# Patient Record
Sex: MALE | Race: WHITE | NOT HISPANIC OR LATINO | Employment: FULL TIME | URBAN - METROPOLITAN AREA
[De-identification: names, ages, dates, MRNs, and addresses within clinical notes are randomized per-mention and may not be internally consistent; named-entity substitution may affect disease eponyms.]

---

## 2017-06-12 ENCOUNTER — HOSPITAL ENCOUNTER (EMERGENCY)
Facility: HOSPITAL | Age: 47
Discharge: HOME | End: 2017-06-12
Attending: EMERGENCY MEDICINE
Payer: COMMERCIAL

## 2017-06-12 VITALS
DIASTOLIC BLOOD PRESSURE: 96 MMHG | OXYGEN SATURATION: 94 % | WEIGHT: 220 LBS | RESPIRATION RATE: 16 BRPM | SYSTOLIC BLOOD PRESSURE: 139 MMHG | HEIGHT: 69 IN | TEMPERATURE: 98 F | BODY MASS INDEX: 32.58 KG/M2 | HEART RATE: 93 BPM

## 2017-06-12 DIAGNOSIS — K62.5 RECTAL BLEEDING: Primary | ICD-10-CM

## 2017-06-12 LAB
ALBUMIN SERPL BCP-MCNC: 3.4 G/DL
ALP SERPL-CCNC: 43 U/L
ALT SERPL W/O P-5'-P-CCNC: 15 U/L
ANION GAP SERPL CALC-SCNC: 9 MMOL/L
APTT BLDCRRT: 24.7 SEC
AST SERPL-CCNC: 12 U/L
BASOPHILS # BLD AUTO: 0 K/UL
BASOPHILS NFR BLD: 0.3 %
BILIRUB SERPL-MCNC: 0.5 MG/DL
BUN SERPL-MCNC: 17 MG/DL
CALCIUM SERPL-MCNC: 8.9 MG/DL
CHLORIDE SERPL-SCNC: 107 MMOL/L
CO2 SERPL-SCNC: 25 MMOL/L
CREAT SERPL-MCNC: 0.9 MG/DL
DIFFERENTIAL METHOD: ABNORMAL
EOSINOPHIL # BLD AUTO: 0.4 K/UL
EOSINOPHIL NFR BLD: 4.8 %
ERYTHROCYTE [DISTWIDTH] IN BLOOD BY AUTOMATED COUNT: 13.8 %
EST. GFR  (AFRICAN AMERICAN): >60 ML/MIN/1.73 M^2
EST. GFR  (NON AFRICAN AMERICAN): >60 ML/MIN/1.73 M^2
GLUCOSE SERPL-MCNC: 156 MG/DL
HCT VFR BLD AUTO: 43.1 %
HGB BLD-MCNC: 14.5 G/DL
INR PPP: 1.1
LYMPHOCYTES # BLD AUTO: 1.1 K/UL
LYMPHOCYTES NFR BLD: 12.7 %
MCH RBC QN AUTO: 29.5 PG
MCHC RBC AUTO-ENTMCNC: 33.7 %
MCV RBC AUTO: 87 FL
MONOCYTES # BLD AUTO: 0.7 K/UL
MONOCYTES NFR BLD: 7.8 %
NEUTROPHILS # BLD AUTO: 6.5 K/UL
NEUTROPHILS NFR BLD: 74.4 %
PLATELET # BLD AUTO: 202 K/UL
PMV BLD AUTO: 8.4 FL
POTASSIUM SERPL-SCNC: 3.5 MMOL/L
PROT SERPL-MCNC: 6.4 G/DL
PROTHROMBIN TIME: 11.2 SEC
RBC # BLD AUTO: 4.93 M/UL
SODIUM SERPL-SCNC: 141 MMOL/L
WBC # BLD AUTO: 8.8 K/UL

## 2017-06-12 PROCEDURE — 85610 PROTHROMBIN TIME: CPT

## 2017-06-12 PROCEDURE — 85730 THROMBOPLASTIN TIME PARTIAL: CPT

## 2017-06-12 PROCEDURE — 36415 COLL VENOUS BLD VENIPUNCTURE: CPT

## 2017-06-12 PROCEDURE — 99283 EMERGENCY DEPT VISIT LOW MDM: CPT

## 2017-06-12 PROCEDURE — 85025 COMPLETE CBC W/AUTO DIFF WBC: CPT

## 2017-06-12 PROCEDURE — 80053 COMPREHEN METABOLIC PANEL: CPT

## 2017-06-12 RX ORDER — NAPROXEN SODIUM 220 MG/1
81 TABLET, FILM COATED ORAL DAILY
COMMUNITY
End: 2022-08-28 | Stop reason: CLARIF

## 2017-06-13 NOTE — ED PROVIDER NOTES
Encounter Date: 6/12/2017    SCRIBE #1 NOTE: I, Ness Jackson, am scribing for, and in the presence of, Dr. Rudolph.       History     Chief Complaint   Patient presents with    Rectal Bleeding     bright red blood x 1 episode tonight. Patient reports 1 episode of nose bleed saturday     Review of patient's allergies indicates:  No Known Allergies  06/12/2017  9:51 PM     Chief Complaint: Blood in stool      The patient is a 46 y.o. male with a PMHx of DM and HTN who is presenting with a sudden acute onset of one episode of blood in stool that occurred 1.5 hrs ago. Pt described his blood in stool as bright red blood in the toilet and very little brown stool with dark red spots. No hx of similar symptoms or prior bleeding. Pt reported a hx of colon polyps noted during his last colonoscopy 15 yrs ago. He also c/o a nosebleed 2 days ago. Pt currently on Aspirin. He denied eating anything red recently. No SOB. No abd pain or rectal pain. No syncope. Pt has a past surgical history that includes Back surgery and right knee arthroscope.        The history is provided by the patient and the spouse.     Past Medical History:   Diagnosis Date    Diabetes mellitus     Hypertension      Past Surgical History:   Procedure Laterality Date    BACK SURGERY      right knee arthroscope  1986     Family History   Problem Relation Age of Onset    Arthritis Mother     Diabetes Mother     Heart disease Mother     Hyperlipidemia Father     Hypertension Father     Cancer Maternal Aunt      Social History   Substance Use Topics    Smoking status: Never Smoker    Smokeless tobacco: Not on file    Alcohol use 1.2 oz/week     2 Cans of beer per week      Comment: occassional     Review of Systems   Constitutional: Negative for fever.   HENT: Positive for nosebleeds (Nosebleed 2 days ago.).    Eyes: Negative for visual disturbance.   Respiratory: Negative for shortness of breath.    Cardiovascular: Negative for chest pain.    Gastrointestinal: Positive for blood in stool. Negative for abdominal pain and rectal pain.   Genitourinary: Negative for dysuria.   Musculoskeletal: Negative for back pain.   Skin: Negative for rash.   Neurological: Negative for syncope.   Hematological: Does not bruise/bleed easily.   Psychiatric/Behavioral: Negative for confusion.       Physical Exam     Initial Vitals [06/12/17 2052]   BP Pulse Resp Temp SpO2   (!) 155/98 101 16 97.6 °F (36.4 °C) 96 %     Physical Exam    Nursing note and vitals reviewed.  Constitutional: He appears well-developed and well-nourished.   HENT:   Head: Normocephalic and atraumatic.   Mouth/Throat: Oropharynx is clear and moist.   Eyes: Conjunctivae and EOM are normal. Pupils are equal, round, and reactive to light.   Neck: Neck supple.   Cardiovascular: Normal rate, regular rhythm, normal heart sounds and intact distal pulses. Exam reveals no gallop and no friction rub.    No murmur heard.  Pulmonary/Chest: Breath sounds normal. He has no wheezes. He has no rhonchi. He has no rales.   Abdominal: Soft. He exhibits no distension. There is no tenderness.   Genitourinary:   Genitourinary Comments: Rectal Exam: Trace blood on glove. Guaiac positive stool. No external hemorrhoids. No unusual tenderness or masses.   Musculoskeletal: Normal range of motion.   Neurological: He is alert and oriented to person, place, and time.   Skin: No rash noted. No erythema.   Psychiatric: He has a normal mood and affect.         ED Course   Procedures  Labs Reviewed   CBC W/ AUTO DIFFERENTIAL - Abnormal; Notable for the following:        Result Value    MPV 8.4 (*)     Gran% 74.4 (*)     Lymph% 12.7 (*)     All other components within normal limits   COMPREHENSIVE METABOLIC PANEL - Abnormal; Notable for the following:     Glucose 156 (*)     Albumin 3.4 (*)     Alkaline Phosphatase 43 (*)     All other components within normal limits   PROTIME-INR   APTT                        Scribe Attestation:    Scribe #1: I performed the above scribed service and the documentation accurately describes the services I performed. I attest to the accuracy of the note.    Attending Attestation:           Physician Attestation for Scribe:  Physician Attestation Statement for Scribe #1: I, Dr. Rudolph, reviewed documentation, as scribed by Ness Jackson in my presence, and it is both accurate and complete.         Jules Floyd Jr. is a 46 y.o. male presenting with one episode of bright red blood per rectum.  Patient is well-appearing with no anemia.  Very low suspicion for hypovolemia or life-threatening bleeding.  Differential including AVM, diverticulosis, polyp, internal hemorrhoids discussed with patient in detail.  GI follow-up recommended.  Return precautions reviewed.        ED Course     Clinical Impression:   The encounter diagnosis was Rectal bleeding.          Cali Rudolph MD  06/13/17 8523

## 2017-06-14 ENCOUNTER — HOSPITAL ENCOUNTER (EMERGENCY)
Facility: HOSPITAL | Age: 47
Discharge: HOME | End: 2017-06-14
Attending: EMERGENCY MEDICINE
Payer: COMMERCIAL

## 2017-06-14 VITALS
SYSTOLIC BLOOD PRESSURE: 142 MMHG | HEIGHT: 69 IN | BODY MASS INDEX: 31.84 KG/M2 | TEMPERATURE: 98 F | DIASTOLIC BLOOD PRESSURE: 89 MMHG | RESPIRATION RATE: 16 BRPM | HEART RATE: 96 BPM | WEIGHT: 215 LBS | OXYGEN SATURATION: 97 %

## 2017-06-14 DIAGNOSIS — K62.5 RECTAL BLEEDING: Primary | ICD-10-CM

## 2017-06-14 LAB
ALBUMIN SERPL BCP-MCNC: 3.7 G/DL
ALP SERPL-CCNC: 45 U/L
ALT SERPL W/O P-5'-P-CCNC: 16 U/L
ANION GAP SERPL CALC-SCNC: 10 MMOL/L
AST SERPL-CCNC: 12 U/L
BASOPHILS # BLD AUTO: 0 K/UL
BASOPHILS NFR BLD: 0 %
BILIRUB SERPL-MCNC: 0.4 MG/DL
BUN SERPL-MCNC: 19 MG/DL
CALCIUM SERPL-MCNC: 9.6 MG/DL
CHLORIDE SERPL-SCNC: 106 MMOL/L
CO2 SERPL-SCNC: 25 MMOL/L
CREAT SERPL-MCNC: 1 MG/DL
DIFFERENTIAL METHOD: ABNORMAL
EOSINOPHIL # BLD AUTO: 0.3 K/UL
EOSINOPHIL NFR BLD: 2.5 %
ERYTHROCYTE [DISTWIDTH] IN BLOOD BY AUTOMATED COUNT: 13.6 %
EST. GFR  (AFRICAN AMERICAN): >60 ML/MIN/1.73 M^2
EST. GFR  (NON AFRICAN AMERICAN): >60 ML/MIN/1.73 M^2
GLUCOSE SERPL-MCNC: 107 MG/DL
HCT VFR BLD AUTO: 46 %
HGB BLD-MCNC: 15.7 G/DL
LYMPHOCYTES # BLD AUTO: 1 K/UL
LYMPHOCYTES NFR BLD: 8.9 %
MCH RBC QN AUTO: 29.6 PG
MCHC RBC AUTO-ENTMCNC: 34.1 %
MCV RBC AUTO: 87 FL
MONOCYTES # BLD AUTO: 0.6 K/UL
MONOCYTES NFR BLD: 5.2 %
NEUTROPHILS # BLD AUTO: 9 K/UL
NEUTROPHILS NFR BLD: 83.4 %
PLATELET # BLD AUTO: 236 K/UL
PMV BLD AUTO: 7.9 FL
POTASSIUM SERPL-SCNC: 4.4 MMOL/L
PROT SERPL-MCNC: 7.1 G/DL
RBC # BLD AUTO: 5.3 M/UL
SODIUM SERPL-SCNC: 141 MMOL/L
WBC # BLD AUTO: 10.8 K/UL

## 2017-06-14 PROCEDURE — 99283 EMERGENCY DEPT VISIT LOW MDM: CPT

## 2017-06-14 PROCEDURE — 36415 COLL VENOUS BLD VENIPUNCTURE: CPT

## 2017-06-14 PROCEDURE — 85025 COMPLETE CBC W/AUTO DIFF WBC: CPT

## 2017-06-14 PROCEDURE — 80053 COMPREHEN METABOLIC PANEL: CPT

## 2017-06-14 NOTE — ED PROVIDER NOTES
"Encounter Date: 6/14/2017    SCRIBE #1 NOTE: I, Judith Rodgers, am scribing for, and in the presence of, Dr. Hernandez.       History     Chief Complaint   Patient presents with    Rectal Bleeding     Dark rectal bleeding.  "I just can't stop.  It's bad".  Denies abdominal pain.  Seen here for same a couple of days ago.      Review of patient's allergies indicates:  No Known Allergies    06/14/2017 10:36 AM     Chief Complaint: Blood in stool      The patient is a 46 y.o. male with DM and HTN who presents to the ED with an onset of progressively worsening bright red blood with clots present in his stool over the last 3 days with 4 episodes this morning. He was seen in the ER 3 days ago and was informed to returned if his symptoms worsened. The patient reports prior similar symptoms ~15 years ago where a colonoscopy was performed by Dr. Alex Garcia that revealed polyps. He denies abdominal pain or any other symptoms at this time. No pertinent SHx noted.       The history is provided by the patient and the spouse (wife).     Past Medical History:   Diagnosis Date    Diabetes mellitus     Hypertension      Past Surgical History:   Procedure Laterality Date    BACK SURGERY      right knee arthroscope  1986     Family History   Problem Relation Age of Onset    Arthritis Mother     Diabetes Mother     Heart disease Mother     Hyperlipidemia Father     Hypertension Father     Cancer Maternal Aunt      Social History   Substance Use Topics    Smoking status: Never Smoker    Smokeless tobacco: Not on file    Alcohol use 1.2 oz/week     2 Cans of beer per week      Comment: occassional     Review of Systems   Constitutional: Negative for chills and fever.   HENT: Negative for nosebleeds.    Eyes: Negative for visual disturbance.   Respiratory: Negative for cough and shortness of breath.    Cardiovascular: Negative for chest pain and palpitations.   Gastrointestinal: Positive for blood in stool. Negative for " abdominal pain, diarrhea, nausea and vomiting.   Genitourinary: Negative for dysuria and hematuria.   Musculoskeletal: Negative for back pain and neck pain.   Skin: Negative for rash.   Neurological: Negative for seizures, syncope and headaches.     Physical Exam     Initial Vitals [06/14/17 0928]   BP Pulse Resp Temp SpO2   (!) 133/95 103 20 97.7 °F (36.5 °C) 96 %     Physical Exam    Nursing note and vitals reviewed.  Constitutional: He appears well-developed and well-nourished.  Non-toxic appearance. No distress.   HENT:   Head: Normocephalic and atraumatic.   Eyes: EOM are normal. Pupils are equal, round, and reactive to light.   Neck: Normal range of motion. Neck supple. No no neck rigidity. No JVD present.   Cardiovascular: Normal rate, regular rhythm, normal heart sounds and intact distal pulses.   Abdominal: Soft. Bowel sounds are normal. He exhibits no distension. There is no tenderness. There is no rigidity, no rebound and no guarding.   Genitourinary: Rectal exam shows guaiac positive stool. Guaiac positive stool. : Acceptable.  Genitourinary Comments: Dark red/maroon blood present on rectal exam.    Musculoskeletal: Normal range of motion.   Neurological: He is alert and oriented to person, place, and time. He has normal strength and normal reflexes. No cranial nerve deficit or sensory deficit. He exhibits normal muscle tone. Coordination normal. GCS eye subscore is 4. GCS verbal subscore is 5. GCS motor subscore is 6.   Skin: Skin is warm and dry.   Psychiatric: He has a normal mood and affect. His speech is normal and behavior is normal. He is not actively hallucinating.       ED Course   Procedures  Labs Reviewed   CBC W/ AUTO DIFFERENTIAL - Abnormal; Notable for the following:        Result Value    MPV 7.9 (*)     Gran # 9.0 (*)     Gran% 83.4 (*)     Lymph% 8.9 (*)     All other components within normal limits   COMPREHENSIVE METABOLIC PANEL - Abnormal; Notable for the following:      Alkaline Phosphatase 45 (*)     All other components within normal limits           Medical Decision Making:   History:   Old Medical Records: I decided to obtain old medical records.  Initial Assessment:   Patient is a 46-year-old man who presents emergency department for evaluation of rectal bleeding.  He has been having symptoms since Monday of became worse this morning have an approximately 4 episodes of bloody bowel movements this morning.  No chest pain or shortness of breath but patient did feel lightheaded.  Rectal exam shows a mild amount of dark maroon colored blood which is Hemoccult positive.  He has had no episodes bloody bowel movements in the ED.  Vital signs are stable, mild initial tachycardia which resolved.  Hemoglobin and hematocrit normal at 15.7 and 46.  Platelets are normal at 236.  Discussed with Dr. Garcia for outpatient follow-up and is agreeable to perform colonoscopy on Friday.  With patient and family members and informed him of workup and plan.  They're in agreement.  Return cautions were discussed for shortness of breath, chest pain, syncopal episodes, worsening bleeding or any other concerns.  He is discharge improved in no acute distress.  Clinical Tests:   Lab Tests: Ordered and Reviewed            Scribe Attestation:   Scribe #1: I performed the above scribed service and the documentation accurately describes the services I performed. I attest to the accuracy of the note.    Attending Attestation:           Physician Attestation for Scribe:  Physician Attestation Statement for Scribe #1: I, Dr. Hernandez, reviewed documentation, as scribed by Judith Rodgers in my presence, and it is both accurate and complete.                 ED Course     Clinical Impression:     1. Rectal bleeding          Disposition:   Disposition: Discharged  Condition: Stable       Capo Hernandez MD  06/14/17 [id number]

## 2017-06-14 NOTE — ED NOTES
Pt presents to ED with c/o dark red rectal bleeding x4 days. Pt has had rectal bleeding in the past due to polyps. Pt is AAOx4. Skin warm, dry to touch. Respirations even, nonlabored. NAD noted. VSS. Pt denies abdominal pain, nausea, or vomiting. Pt reports that he has had four blood BM today.

## 2017-06-17 LAB
GLUCOSE SERPL-MCNC: 145 MG/DL (ref 70–99)
GLUCOSE SERPL-MCNC: 171 MG/DL (ref 70–99)
GLUCOSE SERPL-MCNC: 94 MG/DL (ref 70–99)
GLUCOSE SERPL-MCNC: 96 MG/DL (ref 70–99)

## 2017-06-18 LAB
BUN SERPL-MCNC: 10 MG/DL (ref 8–20)
CALCIUM SERPL-MCNC: 8.8 MG/DL (ref 7.7–10.4)
CHLORIDE: 107 MMOL/L (ref 98–110)
CO2 SERPL-SCNC: 28.2 MMOL/L (ref 22.8–31.6)
CREATININE: 0.86 MG/DL (ref 0.6–1.4)
GLUCOSE SERPL-MCNC: 104 MG/DL (ref 70–99)
GLUCOSE SERPL-MCNC: 140 MG/DL (ref 70–99)
GLUCOSE: 161 MG/DL (ref 70–99)
HCT VFR BLD AUTO: 37 % (ref 39–55)
HGB BLD-MCNC: 12.4 G/DL (ref 14–16)
MCH RBC QN AUTO: 30.5 PG (ref 25–35)
MCHC RBC AUTO-ENTMCNC: 33.5 G/DL (ref 31–36)
MCV RBC AUTO: 91.1 FL (ref 80–100)
NUCLEATED RBCS: 0 %
PLATELET # BLD AUTO: 196 K/UL (ref 140–440)
POTASSIUM SERPL-SCNC: 4.5 MMOL/L (ref 3.5–5)
RBC # BLD AUTO: 4.06 M/UL (ref 4.3–5.9)
SODIUM: 140 MMOL/L (ref 134–144)
WBC # BLD AUTO: 7.8 K/UL (ref 5–10)

## 2017-06-21 DIAGNOSIS — E11.69 DIABETES MELLITUS TYPE 2 IN OBESE: ICD-10-CM

## 2017-06-21 DIAGNOSIS — E29.1 HYPOGONADISM IN MALE: Primary | ICD-10-CM

## 2017-06-21 DIAGNOSIS — E66.9 DIABETES MELLITUS TYPE 2 IN OBESE: ICD-10-CM

## 2017-06-22 RX ORDER — TESTOSTERONE CYPIONATE 200 MG/ML
200 INJECTION, SOLUTION INTRAMUSCULAR
Qty: 10 ML | Refills: 0 | Status: SHIPPED | OUTPATIENT
Start: 2017-06-22 | End: 2017-12-12 | Stop reason: SDUPTHER

## 2017-06-22 NOTE — TELEPHONE ENCOUNTER
I printed, but he is due for labs and OV.  They did some labs when he was in hospital, but not testosterone and some others.  I sent order to [facility]

## 2017-08-08 RX ORDER — DULAGLUTIDE 1.5 MG/.5ML
INJECTION, SOLUTION SUBCUTANEOUS
Qty: 6 ML | Refills: 0 | Status: SHIPPED | OUTPATIENT
Start: 2017-08-08 | End: 2017-10-18 | Stop reason: SDUPTHER

## 2017-10-18 RX ORDER — DULAGLUTIDE 1.5 MG/.5ML
INJECTION, SOLUTION SUBCUTANEOUS
Qty: 6 ML | Refills: 0 | Status: SHIPPED | OUTPATIENT
Start: 2017-10-18 | End: 2020-07-06

## 2017-12-12 DIAGNOSIS — E29.1 HYPOGONADISM IN MALE: ICD-10-CM

## 2017-12-13 RX ORDER — TESTOSTERONE CYPIONATE 200 MG/ML
200 INJECTION, SOLUTION INTRAMUSCULAR
Qty: 10 ML | Refills: 0 | Status: SHIPPED | OUTPATIENT
Start: 2017-12-13 | End: 2019-09-10 | Stop reason: CLARIF

## 2019-02-18 ENCOUNTER — HOSPITAL ENCOUNTER (EMERGENCY)
Facility: HOSPITAL | Age: 49
Discharge: HOME | End: 2019-02-18
Attending: EMERGENCY MEDICINE
Payer: COMMERCIAL

## 2019-02-18 VITALS
HEIGHT: 69 IN | BODY MASS INDEX: 34.07 KG/M2 | RESPIRATION RATE: 20 BRPM | HEART RATE: 109 BPM | WEIGHT: 230 LBS | TEMPERATURE: 98 F | SYSTOLIC BLOOD PRESSURE: 141 MMHG | OXYGEN SATURATION: 96 % | DIASTOLIC BLOOD PRESSURE: 87 MMHG

## 2019-02-18 DIAGNOSIS — R31.0 GROSS HEMATURIA: Primary | ICD-10-CM

## 2019-02-18 LAB
ALBUMIN SERPL BCP-MCNC: 3.7 G/DL
ALP SERPL-CCNC: 66 U/L
ALT SERPL W/O P-5'-P-CCNC: 17 U/L
ANION GAP SERPL CALC-SCNC: 8 MMOL/L
APTT BLDCRRT: 24.8 SEC
AST SERPL-CCNC: 13 U/L
BACTERIA #/AREA URNS HPF: ABNORMAL /HPF
BASOPHILS # BLD AUTO: 0 K/UL
BASOPHILS NFR BLD: 0.3 %
BILIRUB SERPL-MCNC: 0.4 MG/DL
BILIRUB UR QL STRIP: NEGATIVE
BUN SERPL-MCNC: 15 MG/DL
CALCIUM SERPL-MCNC: 9.1 MG/DL
CHLORIDE SERPL-SCNC: 103 MMOL/L
CLARITY UR: ABNORMAL
CO2 SERPL-SCNC: 27 MMOL/L
COLOR UR: YELLOW
CREAT SERPL-MCNC: 0.8 MG/DL
DIFFERENTIAL METHOD: ABNORMAL
EOSINOPHIL # BLD AUTO: 0.4 K/UL
EOSINOPHIL NFR BLD: 3.6 %
ERYTHROCYTE [DISTWIDTH] IN BLOOD BY AUTOMATED COUNT: 14.8 %
EST. GFR  (AFRICAN AMERICAN): >60 ML/MIN/1.73 M^2
EST. GFR  (NON AFRICAN AMERICAN): >60 ML/MIN/1.73 M^2
GLUCOSE SERPL-MCNC: 146 MG/DL
GLUCOSE UR QL STRIP: NEGATIVE
HCT VFR BLD AUTO: 43.6 %
HGB BLD-MCNC: 14.5 G/DL
HGB UR QL STRIP: ABNORMAL
INR PPP: 1
KETONES UR QL STRIP: ABNORMAL
LEUKOCYTE ESTERASE UR QL STRIP: NEGATIVE
LYMPHOCYTES # BLD AUTO: 1.5 K/UL
LYMPHOCYTES NFR BLD: 13.9 %
MCH RBC QN AUTO: 28.1 PG
MCHC RBC AUTO-ENTMCNC: 33.2 G/DL
MCV RBC AUTO: 85 FL
MICROSCOPIC COMMENT: ABNORMAL
MONOCYTES # BLD AUTO: 0.9 K/UL
MONOCYTES NFR BLD: 7.8 %
NEUTROPHILS # BLD AUTO: 8.1 K/UL
NEUTROPHILS NFR BLD: 74.4 %
NITRITE UR QL STRIP: NEGATIVE
PH UR STRIP: 7 [PH] (ref 5–8)
PLATELET # BLD AUTO: 291 K/UL
PMV BLD AUTO: 8.1 FL
POTASSIUM SERPL-SCNC: 3.8 MMOL/L
PROT SERPL-MCNC: 6.7 G/DL
PROT UR QL STRIP: ABNORMAL
PROTHROMBIN TIME: 10.2 SEC
RBC # BLD AUTO: 5.16 M/UL
RBC #/AREA URNS HPF: >100 /HPF (ref 0–4)
SODIUM SERPL-SCNC: 138 MMOL/L
SP GR UR STRIP: 1.02 (ref 1–1.03)
SQUAMOUS #/AREA URNS HPF: 2 /HPF
URN SPEC COLLECT METH UR: ABNORMAL
UROBILINOGEN UR STRIP-ACNC: 1 EU/DL
WBC # BLD AUTO: 10.9 K/UL
WBC #/AREA URNS HPF: 2 /HPF (ref 0–5)

## 2019-02-18 PROCEDURE — 81000 URINALYSIS NONAUTO W/SCOPE: CPT

## 2019-02-18 PROCEDURE — 85730 THROMBOPLASTIN TIME PARTIAL: CPT

## 2019-02-18 PROCEDURE — 36415 COLL VENOUS BLD VENIPUNCTURE: CPT

## 2019-02-18 PROCEDURE — 85025 COMPLETE CBC W/AUTO DIFF WBC: CPT

## 2019-02-18 PROCEDURE — 99282 EMERGENCY DEPT VISIT SF MDM: CPT

## 2019-02-18 PROCEDURE — 80053 COMPREHEN METABOLIC PANEL: CPT

## 2019-02-18 PROCEDURE — 85610 PROTHROMBIN TIME: CPT

## 2019-02-18 NOTE — ED PROVIDER NOTES
Encounter Date: 2/18/2019    SCRIBE #1 NOTE: I, Ioana Etienne , am scribing for, and in the presence of,  Dr. Ortiz. I have scribed the entire note.       History     Chief Complaint   Patient presents with    Hematuria     started 2 weeks ago and again 3 days ago worsening / no dysuria        02/18/2019  11:14 AM       The patient is a 48 y.o. male who is presenting with the gradual onset of intermittent hematuria that began several days ago. The pt reports that the blood is bright red with minimal clots. He denies any exacerbating or mitigating factors. No associated dysuria, flank pain, abdominal pain, rectal bleeding, penile pain, or swelling. He denies previous episodes of similar complaints. No other comments or complaints. Pt denies being on blood thinners. No hx of kidney or bladder cancer. Pertinent PMHx includes DM and HTN.             The history is provided by the patient and medical records.     Review of patient's allergies indicates:  No Known Allergies  Past Medical History:   Diagnosis Date    Diabetes mellitus     Hypertension      Past Surgical History:   Procedure Laterality Date    BACK SURGERY      right knee arthroscope  1986     Family History   Problem Relation Age of Onset    Arthritis Mother     Diabetes Mother     Heart disease Mother     Hyperlipidemia Father     Hypertension Father     Cancer Maternal Aunt      Social History     Tobacco Use    Smoking status: Never Smoker   Substance Use Topics    Alcohol use: Yes     Alcohol/week: 1.2 oz     Types: 2 Cans of beer per week     Comment: occassional    Drug use: No     Review of Systems   Constitutional: Negative for activity change, appetite change, chills, fatigue and fever.   Eyes: Negative for visual disturbance.   Respiratory: Negative for apnea and shortness of breath.    Cardiovascular: Negative for chest pain and palpitations.   Gastrointestinal: Negative for abdominal distention and abdominal pain.    Genitourinary: Positive for hematuria. Negative for difficulty urinating.   Musculoskeletal: Negative for neck pain.   Skin: Negative for pallor and rash.   Neurological: Negative for headaches.   Hematological: Does not bruise/bleed easily.   Psychiatric/Behavioral: Negative for agitation.       Physical Exam     Initial Vitals [02/18/19 1052]   BP Pulse Resp Temp SpO2   (!) 141/87 109 20 98.3 °F (36.8 °C) 96 %      MAP       --         Physical Exam    Nursing note and vitals reviewed.  Constitutional: He appears well-developed and well-nourished.   HENT:   Head: Normocephalic and atraumatic.   Mouth/Throat: Oropharynx is clear and moist.   Eyes: Conjunctivae and EOM are normal. Pupils are equal, round, and reactive to light.   Neck: Normal range of motion. Neck supple.   Cardiovascular: Normal rate, regular rhythm and normal heart sounds. Exam reveals no gallop and no friction rub.    No murmur heard.  Pulmonary/Chest: Breath sounds normal. No respiratory distress. He has no wheezes. He has no rhonchi. He has no rales.   Abdominal: Soft. He exhibits no distension and no mass. There is no tenderness.   No CVA tenderness. No flank pain.    Genitourinary: Penis normal.   Musculoskeletal: Normal range of motion.   Neurological: He is alert and oriented to person, place, and time. He has normal strength. No sensory deficit.   Skin: Skin is warm and dry.   Psychiatric: He has a normal mood and affect.         ED Course   Procedures  Labs Reviewed   URINALYSIS, REFLEX TO URINE CULTURE - Abnormal; Notable for the following components:       Result Value    Appearance, UA Hazy (*)     Protein, UA Trace (*)     Ketones, UA Trace (*)     Occult Blood UA 3+ (*)     All other components within normal limits    Narrative:     Preferred Collection Type->Urine, Clean Catch   URINALYSIS MICROSCOPIC - Abnormal; Notable for the following components:    RBC, UA >100 (*)     All other components within normal limits    Narrative:      Preferred Collection Type->Urine, Clean Catch   CBC W/ AUTO DIFFERENTIAL   COMPREHENSIVE METABOLIC PANEL   APTT   PROTIME-INR          Imaging Results    None          Medical Decision Making:   History:   Old Medical Records: I decided to obtain old medical records.  Clinical Tests:   Lab Tests: Ordered and Reviewed  ED Management:  48-year-old male presents with painless gross hematuria.  Urinalysis demonstrates only significant hematuria without evidence of pyuria or bacteriuria with infectious etiology unlikely.  The absence of pain makes stone disease unlikely.  He has no evidence of bleeding disorder.  He is referred to Urology for further workup.       APC / Resident Notes:   I, Dr. Jovany Ortiz III, personally performed the services described in this documentation. All medical record entries made by the scribe were at my direction and in my presence.  I have reviewed the chart and agree that the record reflects my personal performance and is accurate and complete       Scribe Attestation:   Scribe #1: I performed the above scribed service and the documentation accurately describes the services I performed. I attest to the accuracy of the note.               Clinical Impression:   There were no encounter diagnoses.                             Jovany Ortiz III, MD  02/18/19 [id number]

## 2019-09-10 ENCOUNTER — HOSPITAL ENCOUNTER (OUTPATIENT)
Facility: HOSPITAL | Age: 49
Discharge: HOME | End: 2019-09-11
Attending: EMERGENCY MEDICINE | Admitting: INTERNAL MEDICINE
Payer: COMMERCIAL

## 2019-09-10 DIAGNOSIS — I10 ESSENTIAL HYPERTENSION: ICD-10-CM

## 2019-09-10 DIAGNOSIS — Z79.4 DIABETES MELLITUS DUE TO UNDERLYING CONDITION WITH HYPEROSMOLARITY WITHOUT COMA, WITH LONG-TERM CURRENT USE OF INSULIN: ICD-10-CM

## 2019-09-10 DIAGNOSIS — R07.9 CHEST PAIN: ICD-10-CM

## 2019-09-10 DIAGNOSIS — I20.0 UNSTABLE ANGINA: ICD-10-CM

## 2019-09-10 DIAGNOSIS — R06.02 SOB (SHORTNESS OF BREATH): ICD-10-CM

## 2019-09-10 DIAGNOSIS — E08.00 DIABETES MELLITUS DUE TO UNDERLYING CONDITION WITH HYPEROSMOLARITY WITHOUT COMA, WITH LONG-TERM CURRENT USE OF INSULIN: ICD-10-CM

## 2019-09-10 DIAGNOSIS — R42 DIZZINESS: Primary | ICD-10-CM

## 2019-09-10 PROBLEM — E11.9 DIABETES MELLITUS: Status: ACTIVE | Noted: 2019-09-10

## 2019-09-10 LAB
ALBUMIN SERPL BCP-MCNC: 3.8 G/DL (ref 3.5–5.2)
ALP SERPL-CCNC: 78 U/L (ref 55–135)
ALT SERPL W/O P-5'-P-CCNC: 17 U/L (ref 10–44)
ANION GAP SERPL CALC-SCNC: 11 MMOL/L (ref 8–16)
AST SERPL-CCNC: 9 U/L (ref 10–40)
BASOPHILS # BLD AUTO: 0.04 K/UL (ref 0–0.2)
BASOPHILS NFR BLD: 0.3 % (ref 0–1.9)
BILIRUB SERPL-MCNC: 0.3 MG/DL (ref 0.1–1)
BNP SERPL-MCNC: <10 PG/ML (ref 0–99)
BUN SERPL-MCNC: 16 MG/DL (ref 6–20)
CALCIUM SERPL-MCNC: 9.9 MG/DL (ref 8.7–10.5)
CHLORIDE SERPL-SCNC: 103 MMOL/L (ref 95–110)
CK MB SERPL-MCNC: 0.6 NG/ML (ref 0.1–6.5)
CK MB SERPL-RTO: 1.7 % (ref 0–5)
CK SERPL-CCNC: 36 U/L (ref 20–200)
CO2 SERPL-SCNC: 24 MMOL/L (ref 23–29)
CREAT SERPL-MCNC: 0.9 MG/DL (ref 0.5–1.4)
D DIMER PPP IA.FEU-MCNC: 0.19 MG/L FEU
DIFFERENTIAL METHOD: ABNORMAL
EOSINOPHIL # BLD AUTO: 0.3 K/UL (ref 0–0.5)
EOSINOPHIL NFR BLD: 2.2 % (ref 0–8)
ERYTHROCYTE [DISTWIDTH] IN BLOOD BY AUTOMATED COUNT: 13.7 % (ref 11.5–14.5)
EST. GFR  (AFRICAN AMERICAN): >60 ML/MIN/1.73 M^2
EST. GFR  (NON AFRICAN AMERICAN): >60 ML/MIN/1.73 M^2
GLUCOSE SERPL-MCNC: 275 MG/DL (ref 70–110)
HCT VFR BLD AUTO: 45.5 % (ref 40–54)
HGB BLD-MCNC: 15.3 G/DL (ref 14–18)
IMM GRANULOCYTES # BLD AUTO: 0.09 K/UL (ref 0–0.04)
LYMPHOCYTES # BLD AUTO: 1.2 K/UL (ref 1–4.8)
LYMPHOCYTES NFR BLD: 9.3 % (ref 18–48)
MCH RBC QN AUTO: 28.5 PG (ref 27–31)
MCHC RBC AUTO-ENTMCNC: 33.6 G/DL (ref 32–36)
MCV RBC AUTO: 85 FL (ref 82–98)
MONOCYTES # BLD AUTO: 1.1 K/UL (ref 0.3–1)
MONOCYTES NFR BLD: 8.2 % (ref 4–15)
NEUTROPHILS # BLD AUTO: 10.2 K/UL (ref 1.8–7.7)
NEUTROPHILS NFR BLD: 79.3 % (ref 38–73)
NRBC BLD-RTO: 0 /100 WBC
PLATELET # BLD AUTO: 263 K/UL (ref 150–350)
PMV BLD AUTO: 10.2 FL (ref 9.2–12.9)
POCT GLUCOSE: 260 MG/DL (ref 70–110)
POTASSIUM SERPL-SCNC: 4 MMOL/L (ref 3.5–5.1)
PROT SERPL-MCNC: 7.2 G/DL (ref 6–8.4)
RBC # BLD AUTO: 5.36 M/UL (ref 4.6–6.2)
SODIUM SERPL-SCNC: 138 MMOL/L (ref 136–145)
T4 FREE SERPL-MCNC: 0.84 NG/DL (ref 0.71–1.51)
TROPONIN I SERPL DL<=0.01 NG/ML-MCNC: <0.006 NG/ML (ref 0–0.03)
TROPONIN I SERPL DL<=0.01 NG/ML-MCNC: <0.006 NG/ML (ref 0–0.03)
TSH SERPL DL<=0.005 MIU/L-ACNC: 0.54 UIU/ML (ref 0.4–4)
WBC # BLD AUTO: 12.8 K/UL (ref 3.9–12.7)

## 2019-09-10 PROCEDURE — 99285 EMERGENCY DEPT VISIT HI MDM: CPT | Mod: 25

## 2019-09-10 PROCEDURE — G0378 HOSPITAL OBSERVATION PER HR: HCPCS

## 2019-09-10 PROCEDURE — 84439 ASSAY OF FREE THYROXINE: CPT

## 2019-09-10 PROCEDURE — 25000003 PHARM REV CODE 250: Performed by: EMERGENCY MEDICINE

## 2019-09-10 PROCEDURE — 84443 ASSAY THYROID STIM HORMONE: CPT

## 2019-09-10 PROCEDURE — 93005 ELECTROCARDIOGRAM TRACING: CPT

## 2019-09-10 PROCEDURE — 83880 ASSAY OF NATRIURETIC PEPTIDE: CPT

## 2019-09-10 PROCEDURE — 82553 CREATINE MB FRACTION: CPT

## 2019-09-10 PROCEDURE — 63600175 PHARM REV CODE 636 W HCPCS: Performed by: INTERNAL MEDICINE

## 2019-09-10 PROCEDURE — 85379 FIBRIN DEGRADATION QUANT: CPT

## 2019-09-10 PROCEDURE — 96372 THER/PROPH/DIAG INJ SC/IM: CPT

## 2019-09-10 PROCEDURE — 85025 COMPLETE CBC W/AUTO DIFF WBC: CPT

## 2019-09-10 PROCEDURE — 36415 COLL VENOUS BLD VENIPUNCTURE: CPT

## 2019-09-10 PROCEDURE — 84484 ASSAY OF TROPONIN QUANT: CPT

## 2019-09-10 PROCEDURE — 84484 ASSAY OF TROPONIN QUANT: CPT | Mod: 91

## 2019-09-10 PROCEDURE — 80053 COMPREHEN METABOLIC PANEL: CPT

## 2019-09-10 PROCEDURE — 82550 ASSAY OF CK (CPK): CPT

## 2019-09-10 RX ORDER — IBUPROFEN 200 MG
24 TABLET ORAL
Status: DISCONTINUED | OUTPATIENT
Start: 2019-09-10 | End: 2019-09-10 | Stop reason: SDUPTHER

## 2019-09-10 RX ORDER — IBUPROFEN 200 MG
16 TABLET ORAL
Status: DISCONTINUED | OUTPATIENT
Start: 2019-09-10 | End: 2019-09-10 | Stop reason: SDUPTHER

## 2019-09-10 RX ORDER — ALPRAZOLAM 1 MG/1
1 TABLET ORAL
Status: COMPLETED | OUTPATIENT
Start: 2019-09-10 | End: 2019-09-10

## 2019-09-10 RX ORDER — BUPROPION HYDROCHLORIDE 150 MG/1
150 TABLET ORAL DAILY
COMMUNITY

## 2019-09-10 RX ORDER — BUPROPION HYDROCHLORIDE 150 MG/1
150 TABLET ORAL DAILY
Status: DISCONTINUED | OUTPATIENT
Start: 2019-09-11 | End: 2019-09-11 | Stop reason: HOSPADM

## 2019-09-10 RX ORDER — IBUPROFEN 200 MG
24 TABLET ORAL
Status: DISCONTINUED | OUTPATIENT
Start: 2019-09-10 | End: 2019-09-11 | Stop reason: HOSPADM

## 2019-09-10 RX ORDER — ACETAMINOPHEN 325 MG/1
650 TABLET ORAL EVERY 6 HOURS PRN
Status: DISCONTINUED | OUTPATIENT
Start: 2019-09-10 | End: 2019-09-11 | Stop reason: HOSPADM

## 2019-09-10 RX ORDER — GLUCAGON 1 MG
1 KIT INJECTION
Status: DISCONTINUED | OUTPATIENT
Start: 2019-09-10 | End: 2019-09-10 | Stop reason: SDUPTHER

## 2019-09-10 RX ORDER — INSULIN ASPART 100 [IU]/ML
0-5 INJECTION, SOLUTION INTRAVENOUS; SUBCUTANEOUS
Status: DISCONTINUED | OUTPATIENT
Start: 2019-09-10 | End: 2019-09-10 | Stop reason: SDUPTHER

## 2019-09-10 RX ORDER — INSULIN ASPART 100 [IU]/ML
0-5 INJECTION, SOLUTION INTRAVENOUS; SUBCUTANEOUS
Status: DISCONTINUED | OUTPATIENT
Start: 2019-09-10 | End: 2019-09-11 | Stop reason: HOSPADM

## 2019-09-10 RX ORDER — OXYCODONE AND ACETAMINOPHEN 7.5; 325 MG/1; MG/1
1 TABLET ORAL EVERY 12 HOURS PRN
COMMUNITY
End: 2021-04-29

## 2019-09-10 RX ORDER — ATORVASTATIN CALCIUM 40 MG/1
40 TABLET, FILM COATED ORAL DAILY
Status: DISCONTINUED | OUTPATIENT
Start: 2019-09-11 | End: 2019-09-11 | Stop reason: HOSPADM

## 2019-09-10 RX ORDER — METOPROLOL TARTRATE 50 MG/1
50 TABLET ORAL 2 TIMES DAILY
Status: DISCONTINUED | OUTPATIENT
Start: 2019-09-10 | End: 2019-09-11

## 2019-09-10 RX ORDER — ENOXAPARIN SODIUM 100 MG/ML
40 INJECTION SUBCUTANEOUS
Status: DISCONTINUED | OUTPATIENT
Start: 2019-09-10 | End: 2019-09-11 | Stop reason: HOSPADM

## 2019-09-10 RX ORDER — ONDANSETRON 2 MG/ML
4 INJECTION INTRAMUSCULAR; INTRAVENOUS EVERY 8 HOURS PRN
Status: DISCONTINUED | OUTPATIENT
Start: 2019-09-10 | End: 2019-09-11 | Stop reason: HOSPADM

## 2019-09-10 RX ORDER — FOSINOPRIL SODIUM AND HYDROCHLOROTHIAZIDE 20; 12.5 MG/1; MG/1
1 TABLET ORAL DAILY
Status: ON HOLD | COMMUNITY
End: 2022-08-25

## 2019-09-10 RX ORDER — SODIUM CHLORIDE 0.9 % (FLUSH) 0.9 %
10 SYRINGE (ML) INJECTION
Status: DISCONTINUED | OUTPATIENT
Start: 2019-09-10 | End: 2019-09-11 | Stop reason: HOSPADM

## 2019-09-10 RX ORDER — PANTOPRAZOLE SODIUM 40 MG/1
40 TABLET, DELAYED RELEASE ORAL DAILY
Status: DISCONTINUED | OUTPATIENT
Start: 2019-09-11 | End: 2019-09-10 | Stop reason: SDUPTHER

## 2019-09-10 RX ORDER — ATORVASTATIN CALCIUM 40 MG/1
40 TABLET, FILM COATED ORAL DAILY
COMMUNITY
End: 2020-07-30

## 2019-09-10 RX ORDER — HYDROCHLOROTHIAZIDE 12.5 MG/1
12.5 TABLET ORAL DAILY
Status: DISCONTINUED | OUTPATIENT
Start: 2019-09-11 | End: 2019-09-11 | Stop reason: HOSPADM

## 2019-09-10 RX ORDER — MELOXICAM 15 MG/1
15 TABLET ORAL DAILY
Status: ON HOLD | COMMUNITY
End: 2022-08-25

## 2019-09-10 RX ORDER — NAPROXEN SODIUM 220 MG/1
81 TABLET, FILM COATED ORAL DAILY
Status: DISCONTINUED | OUTPATIENT
Start: 2019-09-11 | End: 2019-09-11 | Stop reason: HOSPADM

## 2019-09-10 RX ORDER — FOSINOPIRL SODIUM 10 MG/1
20 TABLET ORAL DAILY
Status: DISCONTINUED | OUTPATIENT
Start: 2019-09-11 | End: 2019-09-11 | Stop reason: HOSPADM

## 2019-09-10 RX ORDER — FOSINOPRIL SODIUM AND HYDROCHLOROTHIAZIDE 20; 12.5 MG/1; MG/1
1 TABLET ORAL DAILY
Status: DISCONTINUED | OUTPATIENT
Start: 2019-09-11 | End: 2019-09-10 | Stop reason: SDUPTHER

## 2019-09-10 RX ORDER — GLUCAGON 1 MG
1 KIT INJECTION
Status: DISCONTINUED | OUTPATIENT
Start: 2019-09-10 | End: 2019-09-11 | Stop reason: HOSPADM

## 2019-09-10 RX ORDER — METOPROLOL TARTRATE 50 MG/1
50 TABLET ORAL 2 TIMES DAILY
COMMUNITY
End: 2022-01-20

## 2019-09-10 RX ORDER — PANTOPRAZOLE SODIUM 40 MG/1
40 TABLET, DELAYED RELEASE ORAL DAILY
Status: DISCONTINUED | OUTPATIENT
Start: 2019-09-11 | End: 2019-09-11 | Stop reason: HOSPADM

## 2019-09-10 RX ORDER — DEXTROAMPHETAMINE SACCHARATE, AMPHETAMINE ASPARTATE, DEXTROAMPHETAMINE SULFATE AND AMPHETAMINE SULFATE 7.5; 7.5; 7.5; 7.5 MG/1; MG/1; MG/1; MG/1
30 TABLET ORAL DAILY
COMMUNITY
End: 2020-09-02

## 2019-09-10 RX ORDER — IBUPROFEN 200 MG
16 TABLET ORAL
Status: DISCONTINUED | OUTPATIENT
Start: 2019-09-10 | End: 2019-09-11 | Stop reason: HOSPADM

## 2019-09-10 RX ADMIN — ALPRAZOLAM 1 MG: 1 TABLET ORAL at 05:09

## 2019-09-10 RX ADMIN — ENOXAPARIN SODIUM 40 MG: 100 INJECTION SUBCUTANEOUS at 11:09

## 2019-09-10 RX ADMIN — INSULIN ASPART 1 UNITS: 100 INJECTION, SOLUTION INTRAVENOUS; SUBCUTANEOUS at 11:09

## 2019-09-10 RX ADMIN — METOPROLOL TARTRATE 50 MG: 50 TABLET, FILM COATED ORAL at 11:09

## 2019-09-10 NOTE — ED NOTES
Pt awake alert oriented reports SOB with dizziness for the past few days, pt was seen by MD today and told to come to ED if symptoms got worse, pt denies chest pain reports SOB on exertion

## 2019-09-10 NOTE — ED PROVIDER NOTES
"Encounter Date: 9/10/2019    SCRIBE #1 NOTE: I, Sheebapriscila Ledezma, am scribing for, and in the presence of, Jovany Ortiz MD.       History     Chief Complaint   Patient presents with    Shortness of Breath     x 1 month     Dizziness    Weakness       Time seen by provider: 3:53 PM on 09/10/2019    Jules Floyd Jr. is a 48 y.o. male with PMHx of DM and HTN who presents to the ED with complaints of SOB, fatigue, and sweating. He reports having SOB for the past x1-2 weeks but is not associated with exertion or laying flat. He endorses sweating "uncontrollably" for the past x1 month and expresses it may be secondary to uncontrolled BP. The patient is presently on Lisinopril for BP, but states "it doesn't work". He denies diarrhea, blood in stool, black, tarry stool, shakiness, weight loss, chest pain, palpitations, or fevers. Today, the patient was seen my Ina Ellsworth NP and was given a dose of Clonidine for BP with little to no improvements. The patient denies history of heart disease but endorses his mother passed away at 58 years old due to CHF. The patient denies history of anxiety. He has no other medical concerns or complaints at this moment. He denies onset of any other new symptoms currently. SHx includes back surgery and right knee arthroscope. NKDA noted.     The history is provided by the patient.     Review of patient's allergies indicates:  No Known Allergies  Past Medical History:   Diagnosis Date    Diabetes mellitus     Hypertension      Past Surgical History:   Procedure Laterality Date    BACK SURGERY      right knee arthroscope  1986     Family History   Problem Relation Age of Onset    Arthritis Mother     Diabetes Mother     Heart disease Mother     Hyperlipidemia Father     Hypertension Father     Cancer Maternal Aunt      Social History     Tobacco Use    Smoking status: Never Smoker   Substance Use Topics    Alcohol use: Yes     Alcohol/week: 1.2 oz     Types: 2 Cans of beer " per week     Comment: occassional    Drug use: No     Review of Systems   Constitutional: Positive for diaphoresis and fatigue.   Respiratory: Positive for shortness of breath. Negative for cough and wheezing.    Cardiovascular: Negative for chest pain.   Gastrointestinal: Negative for abdominal pain, diarrhea, nausea and vomiting.   Genitourinary: Negative for difficulty urinating and dysuria.   Musculoskeletal: Negative for joint swelling.   Skin: Negative for rash.   Neurological: Negative for numbness.   Hematological: Does not bruise/bleed easily.   Psychiatric/Behavioral: The patient is not nervous/anxious.        Physical Exam     Initial Vitals [09/10/19 1544]   BP Pulse Resp Temp SpO2   (!) 141/91 107 20 98.1 °F (36.7 °C) 95 %      MAP       --         Physical Exam    Nursing note and vitals reviewed.  Constitutional: He appears well-developed and well-nourished. He is not diaphoretic. No distress.   HENT:   Head: Normocephalic and atraumatic.   Eyes: Conjunctivae are normal.   Neck: Normal range of motion. Neck supple. No thyromegaly present. No JVD present.   Negative JVD. No thyromegaly.    Cardiovascular: Regular rhythm and normal heart sounds. Tachycardia present.  Exam reveals no gallop and no friction rub.    No murmur heard.  Pulmonary/Chest: Breath sounds normal. No respiratory distress. He has no wheezes. He has no rhonchi. He has no rales.   Equal, bilateral breath sounds noted without wheezing.    Abdominal: Soft. He exhibits no distension. There is no hepatomegaly. There is no tenderness. There is no rebound and no guarding.   Abdomen soft and non tender. No distention. Negative rebound or guarding. No hepatomegaly.    Musculoskeletal: Normal range of motion.   Neurological: He is alert and oriented to person, place, and time.   Skin: Skin is warm and dry.   Psychiatric: He has a normal mood and affect.         ED Course   Procedures  Labs Reviewed   CBC W/ AUTO DIFFERENTIAL - Abnormal;  Notable for the following components:       Result Value    WBC 12.80 (*)     Gran # (ANC) 10.2 (*)     Immature Grans (Abs) 0.09 (*)     Mono # 1.1 (*)     Gran% 79.3 (*)     Lymph% 9.3 (*)     All other components within normal limits   COMPREHENSIVE METABOLIC PANEL - Abnormal; Notable for the following components:    Glucose 275 (*)     AST 9 (*)     All other components within normal limits   B-TYPE NATRIURETIC PEPTIDE   D DIMER, QUANTITATIVE   TSH   TROPONIN I          Imaging Results          X-Ray Chest PA And Lateral (Final result)  Result time 09/10/19 16:21:47    Final result by Ron Hills MD (09/10/19 16:21:47)                 Impression:      Negative chest.  No significant change.      Electronically signed by: Ron Hills MD  Date:    09/10/2019  Time:    16:21             Narrative:    EXAMINATION:  XR CHEST PA AND LATERAL    CLINICAL HISTORY:  Shortness of breath    TECHNIQUE:  PA and lateral views of the chest were performed.    COMPARISON:  01/25/2017    FINDINGS:  The cardiomediastinal silhouette is with normal limits.  The lungs are well expanded without consolidation or pleural effusion.                                 Medical Decision Making:   History:   Old Medical Records: I decided to obtain old medical records.  Clinical Tests:   Lab Tests: Reviewed and Ordered  Radiological Study: Reviewed and Ordered  Medical Tests: Reviewed and Ordered  ED Management:  48-year-old male presents with dizziness with intermittent shortness of breath and chest pain.  Initial workup is negative for cardiac ischemia/MI.  Chest x-ray independently interpreted by me is normal with no evidence of pneumothorax or CHF.  He will be admitted for serial cardiac enzymes and consideration of provocative testing.  Other:   I have discussed this case with another health care provider.  Discussed with Dr. Pierre who will admit the patient            Scribe Attestation:   Scribe #1: I performed the above  scribed service and the documentation accurately describes the services I performed. I attest to the accuracy of the note.               Clinical Impression:       ICD-10-CM ICD-9-CM   1. Dizziness R42 780.4   2. SOB (shortness of breath) R06.02 786.05   3. Unstable angina I20.0 411.1   4. Chest pain R07.9 786.50   5. Diabetes mellitus due to underlying condition with hyperosmolarity without coma, with long-term current use of insulin E08.00 249.20    Z79.4 V58.67   6. Essential hypertension I10 401.9                                Jovany Ortiz III, MD  09/11/19 [id number]

## 2019-09-10 NOTE — LETTER
09/11/2019    To Whom it may concern:                 [address] [place] [id number]  Phone: [phone]   09/11/2019    Patient: Jules Floyd Jr.   YOB: 1970   Date of Visit: 9/10/2019-9/11/19       To Whom it May Concern:    Jules Floyd was seen under my care at [facility] on 9/10/19-9/11/19. Please excuse Yesenia Kapoor while he was under our care.     If you have any questions or concerns, please don't hesitate to call.    Sincerely,       MD Valeri Malcolm RN

## 2019-09-10 NOTE — H&P
PCP: JAYDA Amaya    History & Physical    Chief Complaint:  Dizziness and shortness of breath for 1 month    History of Present Illness:  Patient is a 48 y.o. male admitted to Hospitalist Service from [facility] Room with complaint of shortness of breath and dizziness for 1. Patient reportedly has past medical history significant for hypertension, family history of coronary artery disease and diabetes mellitus - 2.  Patient presented to the emergency room with complaint of worsening fatigue associated with shortness of breath and sweating.  The symptoms are present for almost 1 month.  Patient reports his blood pressure has been running high despite taking lisinopril.  Patient states blood pressure medication is not controlling blood pressure were well.  Today patient was seen by Ina Ellsworth NP and received a dose of oral clonidine for high blood pressure.  Patient denies cigarette smoking. Patient denied abdominal pain, nausea, vomiting, headache, vision changes, focal neuro-deficits, cough or fever.    Past Medical History:   Diagnosis Date    Diabetes mellitus     Hypertension      Past Surgical History:   Procedure Laterality Date    BACK SURGERY      right knee arthroscope  1986     Family History   Problem Relation Age of Onset    Arthritis Mother     Diabetes Mother     Heart disease Mother     Hyperlipidemia Father     Hypertension Father     Cancer Maternal Aunt      Social History     Tobacco Use    Smoking status: Never Smoker   Substance Use Topics    Alcohol use: Yes     Alcohol/week: 1.2 oz     Types: 2 Cans of beer per week     Comment: occassional    Drug use: No      Review of patient's allergies indicates:  No Known Allergies    (Not in a hospital admission)  Review of Systems:  Constitutional: no fever or chills. + Diaphoresis + fatigue  Eyes: no visual changes  Ears, nose, mouth, throat, and face: no nasal congestion or sore throat  Respiratory: no  cough + shorness of breath  Cardiovascular: see HPI  Gastrointestinal: no nausea or vomiting, no abdominal pain or change in bowel habits  Genitourinary: no hematuria or dysuria  Integument/breast: no rash or pruritis  Hematologic/lymphatic: no easy bruising or lymphadenopathy  Musculoskeletal: no arthralgias or myalgias  Neurological: no seizures or tremors.  Behavioral/Psych: no auditory or visual hallucinations  Endocrine: no heat or cold intolerance     OBJECTIVE:     Vital Signs (Most Recent)  Temp: 98.1 °F (36.7 °C) (09/10/19 1544)  Pulse: 100 (09/10/19 1735)  Resp: 20 (09/10/19 1544)  BP: 137/82 (09/10/19 1701)  SpO2: 95 % (09/10/19 1749)    Physical Exam:  General appearance: well developed, appears stated age  Head: normocephalic, atraumatic  Eyes:  conjunctivae/corneas clear. PERRL.  Nose: Nares normal. Septum midline.  Throat: lips, mucosa, and tongue normal; teeth and gums normal, no throat erythema.  Neck: supple, symmetrical, trachea midline, no JVD and thyroid not enlarged, symmetric, no tenderness/mass/nodules  Lungs:  clear to auscultation bilaterally and normal respiratory effort  Chest wall: no tenderness  Heart: regular rate and rhythm, S1, S2 normal, no murmur, click, rub or gallop  Abdomen: soft, non-tender non-distended; bowel sounds normal; no masses,  no organomegaly  Extremities: no cyanosis, clubbing or edema.   Pulses: 2+ and symmetric  Skin: Skin color, texture, turgor normal. No rashes or lesions.  Lymph nodes: Cervical, supraclavicular, and axillary nodes normal.  Neurologic: Normal strength and tone. No focal numbness or weakness. CNII-XII intact.      Laboratory:   CBC:   Recent Labs   Lab 09/10/19  1611   WBC 12.80*   RBC 5.36   HGB 15.3   HCT 45.5      MCV 85   MCH 28.5   MCHC 33.6     CMP:   Recent Labs   Lab 09/10/19  1611   *   CALCIUM 9.9   ALBUMIN 3.8   PROT 7.2      K 4.0   CO2 24      BUN 16   CREATININE 0.9   ALKPHOS 78   ALT 17   AST 9*   BILITOT  0.3     Coagulation: No results for input(s): LABPROT, INR, APTT in the last 168 hours.    D-dimer: 0.19  BNP< 10  TSH: 0.535    Diagnostic Results:  Chest X-Ray: Negative chest.  No significant change.    Leg see my was scanned (January 2013):  Negative myocardial stress and rest perfusion scan    EKH: Sinus tachycardia 105, no acute changes    Assessment/Plan:     Active Hospital Problems    Diagnosis  POA    *Chest pain [R07.9]  Yes    Dizziness [R42]  Supplemental O2 via nasal canula; titrate O2 saturation to >92%.  Serial Cardiac enzymes × 3.  Tele-monitoring.   Cardiology Consultation - Patient requesting Dr. Daniels.  Check fasting lipid panel and EKG in AM.  Use Nitroglycerine PRN Chest pain.  Na restriction <2g/d.Check TSH and free T4.    Yes    Diabetes mellitus 2 [E11.9]  Check blood glucose level q AC/HS.  Use Novolog Insulin Sliding Scale as needed.   Continue American Diabetic Association 1800 Kcal diet.  Check HgA1c.    Yes    Hypertension [I10]  Chronic problem. Will continue chronic medications and monitor for any changes, adjusting as needed.  Yes     DVT prophylaxis:  Use sequential compression stockings and Keyon hose.  Lovenox 40 mg subcu daily.    Melinda Pierre MD  Department of Hospital Medicine   [facility]

## 2019-09-10 NOTE — ED NOTES
Received report from Ross VILLEGAS RN. Assumed care of the pt at this time. No needs. Bed rails are up and call light is within patient reach. Will continue to monitor.

## 2019-09-11 VITALS
RESPIRATION RATE: 20 BRPM | SYSTOLIC BLOOD PRESSURE: 143 MMHG | DIASTOLIC BLOOD PRESSURE: 94 MMHG | HEIGHT: 69 IN | BODY MASS INDEX: 34.36 KG/M2 | HEART RATE: 86 BPM | TEMPERATURE: 97 F | WEIGHT: 232 LBS | OXYGEN SATURATION: 94 %

## 2019-09-11 LAB
CHOLEST SERPL-MCNC: 176 MG/DL (ref 120–199)
CHOLEST/HDLC SERPL: 3 {RATIO} (ref 2–5)
CK MB SERPL-MCNC: 0.5 NG/ML (ref 0.1–6.5)
CK MB SERPL-MCNC: 0.5 NG/ML (ref 0.1–6.5)
CK MB SERPL-RTO: 1.7 % (ref 0–5)
CK MB SERPL-RTO: 1.8 % (ref 0–5)
CK SERPL-CCNC: 28 U/L (ref 20–200)
CK SERPL-CCNC: 29 U/L (ref 20–200)
CV STRESS BASE HR: 93 BPM
DIASTOLIC BLOOD PRESSURE: 94 MMHG
HDLC SERPL-MCNC: 59 MG/DL (ref 40–75)
HDLC SERPL: 33.5 % (ref 20–50)
LDLC SERPL CALC-MCNC: 43.8 MG/DL (ref 63–159)
NONHDLC SERPL-MCNC: 117 MG/DL
OHS CV CPX 85 PERCENT MAX PREDICTED HEART RATE MALE: 145
OHS CV CPX MAX PREDICTED HEART RATE: 171
OHS CV CPX PATIENT IS FEMALE: 0
OHS CV CPX PATIENT IS MALE: 1
OHS CV CPX PEAK DIASTOLIC BLOOD PRESSURE: 99 MMHG
OHS CV CPX PEAK HEAR RATE: 104 BPM
OHS CV CPX PEAK RATE PRESSURE PRODUCT: NORMAL
OHS CV CPX PEAK SYSTOLIC BLOOD PRESSURE: 170 MMHG
OHS CV CPX PERCENT MAX PREDICTED HEART RATE ACHIEVED: 61
OHS CV CPX RATE PRESSURE PRODUCT PRESENTING: NORMAL
POCT GLUCOSE: 253 MG/DL (ref 70–110)
POCT GLUCOSE: 280 MG/DL (ref 70–110)
POCT GLUCOSE: 292 MG/DL (ref 70–110)
STRESS ECHO POST EXERCISE DUR MIN: 1 MINUTES
STRESS ECHO POST EXERCISE DUR SEC: 11 SECONDS
STRESS ECHO TARGET HR: 145.35 BPM
SYSTOLIC BLOOD PRESSURE: 143 MMHG
TRIGL SERPL-MCNC: 366 MG/DL (ref 30–150)
TROPONIN I SERPL DL<=0.01 NG/ML-MCNC: <0.006 NG/ML (ref 0–0.03)

## 2019-09-11 PROCEDURE — 25000003 PHARM REV CODE 250: Performed by: EMERGENCY MEDICINE

## 2019-09-11 PROCEDURE — 63600175 PHARM REV CODE 636 W HCPCS: Performed by: SPECIALIST

## 2019-09-11 PROCEDURE — G0378 HOSPITAL OBSERVATION PER HR: HCPCS

## 2019-09-11 PROCEDURE — 80061 LIPID PANEL: CPT

## 2019-09-11 PROCEDURE — 96372 THER/PROPH/DIAG INJ SC/IM: CPT | Mod: 59

## 2019-09-11 PROCEDURE — 25000003 PHARM REV CODE 250: Performed by: INTERNAL MEDICINE

## 2019-09-11 PROCEDURE — 84484 ASSAY OF TROPONIN QUANT: CPT

## 2019-09-11 PROCEDURE — 94761 N-INVAS EAR/PLS OXIMETRY MLT: CPT

## 2019-09-11 PROCEDURE — 36415 COLL VENOUS BLD VENIPUNCTURE: CPT

## 2019-09-11 PROCEDURE — 82550 ASSAY OF CK (CPK): CPT | Mod: 91

## 2019-09-11 PROCEDURE — 82553 CREATINE MB FRACTION: CPT | Mod: 91

## 2019-09-11 RX ORDER — GLIMEPIRIDE 2 MG/1
2 TABLET ORAL
Qty: 30 TABLET | Refills: 0 | Status: SHIPPED | OUTPATIENT
Start: 2019-09-11 | End: 2020-07-06

## 2019-09-11 RX ORDER — METOPROLOL SUCCINATE 50 MG/1
50 TABLET, EXTENDED RELEASE ORAL 2 TIMES DAILY
Status: DISCONTINUED | OUTPATIENT
Start: 2019-09-11 | End: 2019-09-11 | Stop reason: HOSPADM

## 2019-09-11 RX ORDER — GLIMEPIRIDE 2 MG/1
2 TABLET ORAL
Status: DISCONTINUED | OUTPATIENT
Start: 2019-09-11 | End: 2019-09-11 | Stop reason: HOSPADM

## 2019-09-11 RX ORDER — REGADENOSON 0.08 MG/ML
0.4 INJECTION, SOLUTION INTRAVENOUS ONCE
Status: COMPLETED | OUTPATIENT
Start: 2019-09-11 | End: 2019-09-11

## 2019-09-11 RX ORDER — METFORMIN HYDROCHLORIDE 500 MG/1
1000 TABLET ORAL 2 TIMES DAILY WITH MEALS
Status: DISCONTINUED | OUTPATIENT
Start: 2019-09-11 | End: 2019-09-11 | Stop reason: HOSPADM

## 2019-09-11 RX ADMIN — INSULIN ASPART 3 UNITS: 100 INJECTION, SOLUTION INTRAVENOUS; SUBCUTANEOUS at 06:09

## 2019-09-11 RX ADMIN — REGADENOSON 0.4 MG: 0.08 INJECTION, SOLUTION INTRAVENOUS at 12:09

## 2019-09-11 RX ADMIN — ATORVASTATIN CALCIUM 40 MG: 40 TABLET, FILM COATED ORAL at 12:09

## 2019-09-11 RX ADMIN — BUPROPION HYDROCHLORIDE 150 MG: 150 TABLET, FILM COATED, EXTENDED RELEASE ORAL at 12:09

## 2019-09-11 RX ADMIN — ASPIRIN 81 MG CHEWABLE TABLET 81 MG: 81 TABLET CHEWABLE at 12:09

## 2019-09-11 RX ADMIN — PANTOPRAZOLE SODIUM 40 MG: 40 TABLET, DELAYED RELEASE ORAL at 12:09

## 2019-09-11 RX ADMIN — METOPROLOL TARTRATE 50 MG: 50 TABLET, FILM COATED ORAL at 09:09

## 2019-09-11 RX ADMIN — HYDROCHLOROTHIAZIDE 12.5 MG: 12.5 TABLET ORAL at 12:09

## 2019-09-11 RX ADMIN — INSULIN ASPART 3 UNITS: 100 INJECTION, SOLUTION INTRAVENOUS; SUBCUTANEOUS at 12:09

## 2019-09-11 NOTE — CONSULTS
[facility]  Cardiology  Consult Note    Patient Name: Jules Floyd Jr.  MRN: [id number]  Admission Date: 9/10/2019  Hospital Length of Stay: 0 days  Code Status: Full Code   Attending Provider: Melinda Pierre MD   Consulting Provider: Jose Daniels MD  Primary Care Physician: JAYDA Amaya  Principal Problem:Chest pain    Patient information was obtained from patient and ER records.     Consults  Subjective:     Chief Complaint:  CP    HPI:Patient is a 48 y.o. male admitted to Hospitalist Service from [facility] Room with complaint of shortness of breath and dizziness for 1. Patient reportedly has past medical history significant for hypertension, family history of coronary artery disease and diabetes mellitus - 2.  Patient presented to the emergency room with complaint of worsening fatigue associated with shortness of breath and sweating.  The symptoms are present for almost 1 month.  Patient reports his blood pressure has been running high despite taking lisinopril.  Patient states blood pressure medication is not controlling blood pressure were well.  Today patient was seen by Ina Ellsworth NP and received a dose of oral clonidine for high blood pressure.  Patient denies cigarette smoking. Patient denied abdominal pain, nausea, vomiting, headache, vision changes, focal neuro-deficits, cough or fever , will do lexiscan nuclear stres  Test, add toprol 50 mg daily    Past Medical History:   Diagnosis Date    Diabetes mellitus     Hypertension        Past Surgical History:   Procedure Laterality Date    BACK SURGERY      COLOSTOMY  02/2019 2/2019    right knee arthroscope  1986       Review of patient's allergies indicates:  No Known Allergies    No current facility-administered medications on file prior to encounter.      Current Outpatient Medications on File Prior to Encounter   Medication Sig    aspirin 81 MG Chew Take 81 mg by mouth once daily.     atorvastatin (LIPITOR) 40 MG tablet Take 40 mg by mouth once daily.    buPROPion (WELLBUTRIN XL) 150 MG TB24 tablet Take 150 mg by mouth once daily.    dextroamphetamine-amphetamine (ADDERALL) 30 mg Tab Take 30 mg by mouth once daily.    fosinopril-hydrochlorothiazide (MONOPRIL-HCT) 20-12.5 mg per tablet Take 1 tablet by mouth once daily.    meloxicam (MOBIC) 15 MG tablet Take 15 mg by mouth once daily.    metformin (GLUCOPHAGE) 1000 MG tablet Take 1,000 mg by mouth 2 (two) times daily with meals.    metoprolol tartrate (LOPRESSOR) 50 MG tablet Take 50 mg by mouth 2 (two) times daily.    omeprazole (PRILOSEC) 20 MG capsule Take 20 mg by mouth every other day.     oxyCODONE-acetaminophen (PERCOCET) 7.5-325 mg per tablet Take 1 tablet by mouth every 12 (twelve) hours as needed for Pain.    TRULICITY 1.5 mg/0.5 mL PnIj INJECT 1 SYRINGE INTO THE SKIN EVERY 7 DAYS (Patient taking differently: INJECT 1 SYRINGE INTO THE SKIN EVERY 7 DAYS on Sunday)     Family History     Problem Relation (Age of Onset)    Arthritis Mother    Cancer Maternal Aunt    Diabetes Mother    Heart disease Mother    Hyperlipidemia Father    Hypertension Father        Tobacco Use    Smoking status: Never Smoker   Substance and Sexual Activity    Alcohol use: Yes     Alcohol/week: 1.2 oz     Types: 2 Cans of beer per week     Frequency: 2-4 times a month     Drinks per session: 5 or 6     Binge frequency: Less than monthly     Comment: occassional    Drug use: No    Sexual activity: Yes     Partners: Female     Review of Systems   Cardiovascular: Positive for chest pain and near-syncope.   All other systems reviewed and are negative.    Objective:     Vital Signs (Most Recent):  Temp: 96.5 °F (35.8 °C) (09/11/19 1143)  Pulse: 86 (09/11/19 1228)  Resp: 20 (09/11/19 1143)  BP: (!) 143/94 (09/11/19 1228)  SpO2: (!) 94 % (09/11/19 1143) Vital Signs (24h Range):  Temp:  [96.5 °F (35.8 °C)-98.1 °F (36.7 °C)] 96.5 °F (35.8 °C)  Pulse:  []  86  Resp:  [16-20] 20  SpO2:  [92 %-99 %] 94 %  BP: (127-147)/(82-99) 143/94     Weight: 105.2 kg (232 lb)  Body mass index is 34.26 kg/m².    SpO2: (!) 94 %  O2 Device (Oxygen Therapy): room air    No intake or output data in the 24 hours ending 09/11/19 1249    Lines/Drains/Airways     Peripheral Intravenous Line                 Peripheral IV - Single Lumen 09/10/19 1928 20 G Right Antecubital less than 1 day                Physical Exam   Constitutional: He is oriented to person, place, and time. He appears well-developed.   HENT:   Head: Normocephalic.   Eyes: Pupils are equal, round, and reactive to light.   Neck: Normal range of motion.   Cardiovascular: Normal rate.   Pulmonary/Chest: Effort normal.   Abdominal: Soft.   Musculoskeletal: Normal range of motion.   Neurological: He is alert and oriented to person, place, and time.   Skin: Skin is warm.       Significant Labs:   ABG: No results for input(s): PH, PCO2, HCO3, POCSATURATED, BE in the last 48 hours., Blood Culture: No results for input(s): LABBLOO in the last 48 hours., BMP:   Recent Labs   Lab 09/10/19  1611   *      K 4.0      CO2 24   BUN 16   CREATININE 0.9   CALCIUM 9.9   , CMP   Recent Labs   Lab 09/10/19  1611      K 4.0      CO2 24   *   BUN 16   CREATININE 0.9   CALCIUM 9.9   PROT 7.2   ALBUMIN 3.8   BILITOT 0.3   ALKPHOS 78   AST 9*   ALT 17   ANIONGAP 11   ESTGFRAFRICA >60   EGFRNONAA >60   , CBC   Recent Labs   Lab 09/10/19  1611   WBC 12.80*   HGB 15.3   HCT 45.5      , INR No results for input(s): INR, PROTIME in the last 48 hours., Lipid Panel   Recent Labs   Lab 09/11/19  0454   CHOL 176   HDL 59   LDLCALC 43.8*   TRIG 366*   CHOLHDL 33.5    and Troponin   Recent Labs   Lab 09/10/19  2220 09/11/19  0454 09/11/19  1022   TROPONINI <0.006 <0.006 <0.006       Significant Imaging: X-Ray: CXR: X-Ray Chest 1 View (CXR): No results found for this visit on 09/10/19.  Assessment and Plan:     Active  Diagnoses:    Diagnosis Date Noted POA    PRINCIPAL PROBLEM:  Chest pain [R07.9] 09/10/2019 Yes    Dizziness [R42] 09/10/2019 Yes    Diabetes mellitus 2 [E11.9] 09/10/2019 Yes    Hypertension [I10] 09/10/2019 Yes      Problems Resolved During this Admission:       VTE Risk Mitigation (From admission, onward)        Ordered     enoxaparin injection 40 mg  Every 24 hours (non-standard times)      09/10/19 2000     IP VTE LOW RISK PATIENT  Once      09/10/19 2000          Thank you for your consult. I will follow-up with patient. Please contact us if you have any additional questions.    Jose Daniels MD  Cardiology   [facility]

## 2019-09-11 NOTE — PLAN OF CARE
09/11/19 1547   Final Note   Assessment Type Final Discharge Note   Anticipated Discharge Disposition Home   Hospital Follow Up  Appt(s) scheduled? Yes

## 2019-09-11 NOTE — PLAN OF CARE
CM met with pt bedside to complete discharge assessment. Pt verified information on facesheet as correct. Pt denies POA/LW. Pt reports living at listed address with Yesenia CHARLTON. PCP is APOORVA Buckley at [facility]. Denies hh/dme. Pt reports being independent with all ADLs and is able to drive himself to appts. DC plan is home with no needs. CM following.          09/11/19 1205   Discharge Assessment   Assessment Type Discharge Planning Assessment   Confirmed/corrected address and phone number on facesheet? Yes   Assessment information obtained from? Patient   Communicated expected length of stay with patient/caregiver yes   Prior to hospitilization cognitive status: Alert/Oriented   Prior to hospitalization functional status: Independent   Current cognitive status: Alert/Oriented   Current Functional Status: Independent   Lives With significant other   Able to Return to Prior Arrangements yes   Is patient able to care for self after discharge? Yes   Patient's perception of discharge disposition home or selfcare   Readmission Within the Last 30 Days no previous admission in last 30 days   Patient currently being followed by outpatient case management? No   Patient currently receives any other outside agency services? No   Equipment Currently Used at Home none   Do you have any problems affording any of your prescribed medications? No   Is the patient taking medications as prescribed? yes   Does the patient have transportation home? Yes   Transportation Anticipated family or friend will provide   Does the patient receive services at the [facility]? No   Discharge Plan A Home   DME Needed Upon Discharge  none   Patient/Family in Agreement with Plan yes

## 2019-09-11 NOTE — PLAN OF CARE
09/11/19 0030   Patient Assessment/Suction   Level of Consciousness (AVPU) alert   PRE-TX-O2   O2 Device (Oxygen Therapy) room air   SpO2 95 %   Pulse Oximetry Type Intermittent   $ Pulse Oximetry - Multiple Charge Pulse Oximetry - Multiple

## 2019-09-11 NOTE — DISCHARGE SUMMARY
Discharge Summary  Hospital Medicine    Admit Date: 9/10/2019    Date and Time: 9/11/20199:44 AM    Discharge Attending Physician: Melinda Pierre MD    Primary Care Physician: JAYDA Amaya    Diagnoses:  Active Hospital Problems    Diagnosis  POA    *Chest pain [R07.9]  Yes    Dizziness [R42]  Yes    Diabetes mellitus 2 [E11.9], uncontrolled  Yes    Hypertension [I10]  Yes        Discharged Condition: Good    Hospital Course:   Patient is a 48 y.o. male admitted to Hospitalist Service from [facility] Room with complaint of shortness of breath and dizziness for 1. Patient reportedly has past medical history significant for hypertension, family history of coronary artery disease and diabetes mellitus - 2.  Patient presented to the emergency room with complaint of worsening fatigue associated with shortness of breath and sweating.  The symptoms were present for almost 1 month.  Patient reported his blood pressure had been running high despite taking lisinopril.  Patient stated blood pressure medication was not controlling blood pressure were well.  Patient was seen by Ina Ellsworth NP and received a dose of oral clonidine for high blood pressure.  Patient denied cigarette smoking. Patient denied abdominal pain, nausea, vomiting, headache, vision changes, focal neuro-deficits, cough or fever. Patient was admitted to Hospitalist medicine service. Patient was monitored on telemetry medical floor, serial cardiac enzymes remained negative. Patient was evaluated by cardiologist and had further cardiac workup as mentioned below, which was negative for acute ischemia. Patient's symptoms resolved.  Patient advised to keep a blood glucose log and has been added with Amaryl 2 mg daily.  Patient is to stay well hydrated.  Patient will also have testosterone hormone checked by primary care physician next week.  Lifestyle modification including weight loss plan and daily exercise plan reviewed with  patient and his wife. Patient was discharged home in stable condition with following discharge plan of care.     Consults: Dr. Daniels    Significant Diagnostic Studies:   Chest X-Ray: Negative chest.  No significant change.     Leg see my was scanned (January 2013):  Negative myocardial stress and rest perfusion scan     EKH: Sinus tachycardia 105, no acute changes    Leximyoview scan:  1.  Scintigraphically negative for ischemia or infarct.  2. Global left ventricular systolic function is normal with an LV ejection fraction of 58 % and no evidence of LV dilatation. Wall motion is normal.     Microbiology Results (last 7 days)     ** No results found for the last 168 hours. **        Special Treatments/Procedures: None  Disposition: Home or Self Care    Medications:  Reconciled Home Medications: Current Discharge Medication List      CONTINUE these medications which have NOT CHANGED    Details   aspirin 81 MG Chew Take 81 mg by mouth once daily.      atorvastatin (LIPITOR) 40 MG tablet Take 40 mg by mouth once daily.      buPROPion (WELLBUTRIN XL) 150 MG TB24 tablet Take 150 mg by mouth once daily.      dextroamphetamine-amphetamine (ADDERALL) 30 mg Tab Take 30 mg by mouth once daily.      fosinopril-hydrochlorothiazide (MONOPRIL-HCT) 20-12.5 mg per tablet Take 1 tablet by mouth once daily.      meloxicam (MOBIC) 15 MG tablet Take 15 mg by mouth once daily.      metformin (GLUCOPHAGE) 1000 MG tablet Take 1,000 mg by mouth 2 (two) times daily with meals.      metoprolol tartrate (LOPRESSOR) 50 MG tablet Take 50 mg by mouth 2 (two) times daily.      omeprazole (PRILOSEC) 20 MG capsule Take 20 mg by mouth every other day.       oxyCODONE-acetaminophen (PERCOCET) 7.5-325 mg per tablet Take 1 tablet by mouth every 12 (twelve) hours as needed for Pain.    Comments: Quantity prescribed more than 7 day supply? Press F2 and select one:[id number]        TRULICITY 1.5 mg/0.5 mL PnIj INJECT 1 SYRINGE INTO THE SKIN EVERY 7  DAYS  Qty: 6 mL, Refills: 0           Discharge Procedure Orders   Diet diabetic     Diet Cardiac     Other restrictions (specify):   Order Comments: PLEASE OBSERVE FALL PRECAUTIONS     Call MD for:   Order Comments: For worsening symptoms, chest pain, shortness of breath, increased abdominal pain, high grade fever, stroke or stroke like symptoms, immediately go to the nearest Emergency Room or call 911 as soon as possible.     Follow-up Information     JAYDA Amaya In 1 week.    Specialty:  Family Medicine  Contact information:  [address]  [facility] [address] [phone]             Jose Daniels MD In 2 weeks.    Specialty:  Cardiology  Contact information:  [address] [place] [facility] [id number] [phone]             Please follow up.    Contact information:  Please keep herself well hydrated and maintain a blood glucose log.               Time spent on the discharge of patient: 32 minutes  Patient was seen and examined on the date of discharge and determined to be suitable for discharge.

## 2019-09-11 NOTE — PLAN OF CARE
Hospital follow up scheduled with PCP. AVS updated.        09/11/19 1535   Discharge Assessment   Assessment Type Discharge Planning Reassessment

## 2019-09-11 NOTE — NURSING
Notified Dr. Daniels of negative stress test results. Cleared by Dr. Daniels. Pt is to follow up with him Monday in [place]. Ordered to take Metoprolol [id number]. Notified Dr. Pierre of clearance by Dr. Daniels. Okay to discharge pt. Cleared with CM

## 2019-09-11 NOTE — PLAN OF CARE
Problem: Adult Inpatient Plan of Care  Goal: Plan of Care Review  Outcome: Ongoing (interventions implemented as appropriate)     09/11/19 0227   Plan of Care Review   Plan of Care Reviewed With patient   NAD noted. POC reviewed w pt and they verbalized understanding.  Tele [id number] SR.  Pt free from falls.  Pt ambulated to the bathroom.  Bed in low position, side rails up X2, bed alarm on, wheels locked, call light in reach. Will continue to monitor.

## 2019-09-11 NOTE — NURSING
D/C instructions provided and explained to pt and pt's girlfriend, printed prescriptions provided, understanding of D/C instructions verbalized. IV removed, catheter intact. Tolerated well. Telemetry monitor removed and returned to monitor room. VSS. Pt denies complaints. Walked off unit with girlfriend.

## 2020-07-06 ENCOUNTER — HOSPITAL ENCOUNTER (OUTPATIENT)
Dept: RADIOLOGY | Facility: CLINIC | Age: 50
Discharge: HOME | End: 2020-07-06
Attending: PODIATRIST
Payer: COMMERCIAL

## 2020-07-06 ENCOUNTER — OFFICE VISIT (OUTPATIENT)
Dept: PODIATRY | Facility: CLINIC | Age: 50
End: 2020-07-06
Payer: COMMERCIAL

## 2020-07-06 VITALS
WEIGHT: 232 LBS | HEIGHT: 69 IN | TEMPERATURE: 98 F | SYSTOLIC BLOOD PRESSURE: 126 MMHG | RESPIRATION RATE: 16 BRPM | HEART RATE: 90 BPM | DIASTOLIC BLOOD PRESSURE: 80 MMHG | BODY MASS INDEX: 34.36 KG/M2

## 2020-07-06 DIAGNOSIS — Z79.4 CONTROLLED TYPE 2 DIABETES MELLITUS WITHOUT COMPLICATION, WITH LONG-TERM CURRENT USE OF INSULIN: ICD-10-CM

## 2020-07-06 DIAGNOSIS — E11.9 CONTROLLED TYPE 2 DIABETES MELLITUS WITHOUT COMPLICATION, WITH LONG-TERM CURRENT USE OF INSULIN: ICD-10-CM

## 2020-07-06 DIAGNOSIS — M79.672 FOOT PAIN, LEFT: ICD-10-CM

## 2020-07-06 DIAGNOSIS — M77.42 METATARSALGIA, LEFT FOOT: ICD-10-CM

## 2020-07-06 DIAGNOSIS — M19.079 INFLAMMATION OF FOOT JOINT: Primary | ICD-10-CM

## 2020-07-06 PROCEDURE — 73630 X-RAY EXAM OF FOOT: CPT | Mod: LT,S$GLB,, | Performed by: PODIATRIST

## 2020-07-06 PROCEDURE — 73630 XR FOOT COMPLETE 3 VIEW LEFT: ICD-10-PCS | Mod: LT,S$GLB,, | Performed by: PODIATRIST

## 2020-07-06 PROCEDURE — 99203 OFFICE O/P NEW LOW 30 MIN: CPT | Mod: 25,S$GLB,, | Performed by: PODIATRIST

## 2020-07-06 PROCEDURE — 99203 PR OFFICE/OUTPT VISIT, NEW, LEVL III, 30-44 MIN: ICD-10-PCS | Mod: 25,S$GLB,, | Performed by: PODIATRIST

## 2020-07-06 RX ORDER — EMPAGLIFLOZIN 25 MG/1
25 TABLET, FILM COATED ORAL DAILY
Status: ON HOLD | COMMUNITY
Start: 2020-06-24 | End: 2022-08-25

## 2020-07-06 RX ORDER — MELOXICAM 15 MG/1
15 TABLET ORAL DAILY
Qty: 30 TABLET | Refills: 0 | Status: ON HOLD | OUTPATIENT
Start: 2020-07-06 | End: 2022-08-25

## 2020-07-06 RX ORDER — HYDROCHLOROTHIAZIDE 25 MG/1
TABLET ORAL
COMMUNITY
Start: 2020-05-11 | End: 2022-01-20

## 2020-07-06 RX ORDER — INSULIN ASPART 100 [IU]/ML
INJECTION, SOLUTION INTRAVENOUS; SUBCUTANEOUS
COMMUNITY
Start: 2020-06-18 | End: 2020-09-02

## 2020-07-06 RX ORDER — INSULIN ASPART 100 [IU]/ML
INJECTION, SUSPENSION SUBCUTANEOUS
COMMUNITY
Start: 2020-06-24 | End: 2020-07-28

## 2020-07-06 RX ORDER — LANCETS 28 GAUGE
1 EACH MISCELLANEOUS 3 TIMES DAILY PRN
Status: ON HOLD | COMMUNITY
Start: 2020-06-18 | End: 2023-10-24 | Stop reason: HOSPADM

## 2020-07-06 RX ORDER — PEN NEEDLE, DIABETIC 31 GX5/16"
1 NEEDLE, DISPOSABLE MISCELLANEOUS 3 TIMES DAILY
Status: ON HOLD | COMMUNITY
Start: 2020-06-19 | End: 2023-10-24 | Stop reason: HOSPADM

## 2020-07-06 RX ORDER — BLOOD SUGAR DIAGNOSTIC
1 STRIP MISCELLANEOUS
Status: ON HOLD | COMMUNITY
Start: 2020-06-22 | End: 2023-10-24 | Stop reason: HOSPADM

## 2020-07-06 NOTE — PROGRESS NOTES
[address] [facility] [address]  [place] [address]  Phone: [phone]   [phone]    Patient's PCP:JAYDA Amaya  Referring Provider: No ref. provider found    Subjective:      Chief Complaint:: Diabetic Foot Exam (yearly type 2 exam) and Foot Pain (left foot)    HPI  Jules Floyd Jr. is a 49 y.o. male who presents to the clinic for a diabetic foot exam.   Pt has seen Ina Ellsworth NP on June 22,2020 who treats them for their diabetes.  Pt has been a diabetic for five years.  Taking Novolog and Novolog 70/30 mix to treat diabetes.  Has noticed mild left calf and foot pain while walking.  Blood sugar: 157  Hemoglobin A1C: 8.7     Also presents with a complaint of  Left foot pain lasting for one week. Onset of the symptoms was pain in ball of foot.  Current symptoms include sharp pain and swelling.  Aggravating factors are walking, weightbearing, steeltoe boots. Symptoms have decreased.Treatment to date have included bear back and body Asprin and tylenol  Patients rates pain 8/10 on pain scale.    Vitals:    07/06/20 0839   BP: 126/80   Pulse: 90   Resp: 16   Temp: 98.2 °F (36.8 °C)     Shoe Size: 10    Past Surgical History:   Procedure Laterality Date    BACK SURGERY      COLOSTOMY  02/2019 2/2019    right knee arthroscope  1986     Past Medical History:   Diagnosis Date    Diabetes mellitus     Hypertension      Family History   Problem Relation Age of Onset    Arthritis Mother     Diabetes Mother     Heart disease Mother     Hyperlipidemia Father     Hypertension Father     Cancer Maternal Aunt         Social History:   Marital Status:   Alcohol History:  reports current alcohol use of about 2.0 standard drinks of alcohol per week.  Tobacco History:  reports that he has never smoked. He does not have any smokeless tobacco history on file.  Drug History:  reports no history of drug use.    Review of patient's allergies indicates:  No Known Allergies    Current Outpatient  "Medications   Medication Sig Dispense Refill    aspirin 81 MG Chew Take 81 mg by mouth once daily.      atorvastatin (LIPITOR) 40 MG tablet Take 40 mg by mouth once daily.      BD ULTRA-FINE SHELODN PEN NEEDLE 32 gauge x 5/32" Ndle       buPROPion (WELLBUTRIN XL) 150 MG TB24 tablet Take 150 mg by mouth once daily.      dextroamphetamine-amphetamine (ADDERALL) 30 mg Tab Take 30 mg by mouth once daily.      fosinopril-hydrochlorothiazide (MONOPRIL-HCT) 20-12.5 mg per tablet Take 1 tablet by mouth once daily.      hydroCHLOROthiazide (HYDRODIURIL) 25 MG tablet       JARDIANCE 25 mg Tab       meloxicam (MOBIC) 15 MG tablet Take 15 mg by mouth once daily.      meloxicam (MOBIC) 15 MG tablet Take 1 tablet (15 mg total) by mouth once daily. 30 tablet 0    metoprolol tartrate (LOPRESSOR) 50 MG tablet Take 50 mg by mouth 2 (two) times daily.      NOVOLOG FLEXPEN U-100 INSULIN 100 unit/mL (3 mL) InPn pen INJECT SUBCUTANEOUSLY THREE TIMES DAILY PER SLIDING SCALE. DO NOT EXCEED 60 UNITS PER DAY      NOVOLOG MIX 70-30FLEXPEN U-100 100 unit/mL (70-30) InPn pen       omeprazole (PRILOSEC) 20 MG capsule Take 20 mg by mouth every other day.       oxyCODONE-acetaminophen (PERCOCET) 7.5-325 mg per tablet Take 1 tablet by mouth every 12 (twelve) hours as needed for Pain.      PRODIGY NO CODING Strp       PRODIGY TWIST TOP LANCET 28 gauge Misc        No current facility-administered medications for this visit.        Review of Systems      Objective:        Physical Exam:   Foot Exam    General  General Appearance: appears stated age and healthy   Orientation: alert and oriented to person, place, and time   Affect: appropriate   Gait: antalgic       Right Foot/Ankle     Inspection and Palpation  Ecchymosis: none  Tenderness: none   Swelling: none   Arch: normal  Hammertoes: absent  Claw Toes: absent  Hallux valgus: no  Hallux limitus: no  Skin Exam: skin intact;     Neurovascular  Dorsalis pedis: 2+  Posterior tibial: " 2+  Saphenous nerve sensation: normal  Tibial nerve sensation: normal  Superficial peroneal nerve sensation: normal  Deep peroneal nerve sensation: normal  Sural nerve sensation: normal    Muscle Strength  Ankle dorsiflexion: 5  Ankle plantar flexion: 5  Ankle inversion: 5  Ankle eversion: 5  Great toe extension: 5  Great toe flexion: 5    Range of Motion    Passive  Ankle dorsiflexion: 5    Active  Ankle dorsiflexion: 5      Left Foot/Ankle      Inspection and Palpation  Ecchymosis: none  Tenderness: (Pain plantar 2nd MPJ)  Swelling: (Mild swelling plantar 2nd MPJ)  Arch: normal  Hammertoes: absent  Claw toes: absent  Hallux valgus: no  Hallux limitus: no  Skin Exam: skin intact;     Neurovascular  Dorsalis pedis: 2+  Posterior tibial: 2+  Saphenous nerve sensation: normal  Tibial nerve sensation: normal  Superficial peroneal nerve sensation: normal  Deep peroneal nerve sensation: normal  Sural nerve sensation: normal    Muscle Strength  Ankle dorsiflexion: 5  Ankle plantar flexion: 5  Ankle inversion: 5  Ankle eversion: 5  Great toe extension: 5  Great toe flexion: 5    Range of Motion    Passive  Ankle dorsiflexion: 5    Active  Ankle dorsiflexion: 5          Physical Exam   Cardiovascular:   Pulses:       Dorsalis pedis pulses are 2+ on the right side and 2+ on the left side.        Posterior tibial pulses are 2+ on the right side and 2+ on the left side.   Musculoskeletal:      Right foot: No bunion.      Left foot: No bunion.        Feet:        Imaging:   AP, lateral, lateral oblique weight-bearing x-rays left foot:  No acute fractures, no bone tumors, no soft tissue masses seen.  Anterior ankle exostosis and anterior ankle degenerative joint disease.  Long 2nd metatarsal with slight medial deviation 2nd toe and 2nd MPJ and slight hypertrophy of 2nd metatarsal bone.         Assessment:       1. Inflammation of foot joint    2. Metatarsalgia, left foot    3. Foot pain, left    4. Controlled type 2 diabetes  mellitus without complication, with long-term current use of insulin      Plan:   Inflammation of foot joint    Metatarsalgia, left foot    Foot pain, left  -     X-Ray Foot Complete Left  -     FLAT INSOLES-ACCOMMODATED FOR HOME USE    Controlled type 2 diabetes mellitus without complication, with long-term current use of insulin    Other orders  -     meloxicam (MOBIC) 15 MG tablet; Take 1 tablet (15 mg total) by mouth once daily.  Dispense: 30 tablet; Refill: 0     1.  Today evaluate the patient reviewed my clinical findings as well as my x-ray findings with the patient.  2.  I discussed with him that he has was called metatarsalgia/capsulitis or joint inflammation 2nd metatarsophalangeal joint.  I described to him the length of his metatarsal and the amount of stress that is being placed through the 2nd metatarsal head is what is causing the inflammation of the joint.  With the treat this conservatively I am having him use a flat Spenco inner sole with accommodative felt placed on the inner sole to reduce the pressure on the 2nd metatarsal head.  He will ice the area and take anti-inflammatories/Mobic 15 mg 1 pill daily.  If he is doing well in 4-6 weeks he will not return to see me.  3.  Return as needed are once a year for diabetic checkup  No follow-ups on file.    Procedures - None    Counseling:   I explained what joint inflammation is and  conservative treatment of off loading the joint with OTC inserts and pads vs custom made orthotics.  The use of ice, heat, oral antiinflammatory and topical antiinflammatory and shoe modification as well as rest of the affected area with decrease walking, standing and non-impact exercising.    Counseling/Education:  I provided patient education verbally regarding:   The aspects of diabetes and how it pertains to the feet. I explained the importance of proper diabetic foot care and how it is essential for the health of their feet.    I discussed the importance of knowing  their Hemoglobin A1c and that the level needs to be as close to 6 as possible. I discussed the increase complications of high blood sugar including stroke, blindness, heart attack, kidney failure and loss of limb secondary to neuropathy and PVD.     With neuropathy, beware of any breaks in the skin or redness. These areas are not recognized early due to the numbness.    I discussed Diabetes, lower back issues, metabolic disorders, systemic causes, chemotherapy, vitamin deficiency, heavy metal exposure, as some of the causes. I also explained that as much as 40% of the time we can not find a cause. I discussed different treatments available to control the symptoms but which may not cure the problem.                 I provided patient education verbally regarding:   Patient diagnosis, treatment options, as well as alternatives, risks, and benefits.     This note was created using Dragon voice recognition software that occasionally misinterpreted phrases or words.

## 2020-07-11 ENCOUNTER — OFFICE VISIT (OUTPATIENT)
Dept: PRIMARY CARE CLINIC | Facility: CLINIC | Age: 50
End: 2020-07-11
Payer: COMMERCIAL

## 2020-07-11 VITALS
OXYGEN SATURATION: 96 % | DIASTOLIC BLOOD PRESSURE: 78 MMHG | HEART RATE: 92 BPM | RESPIRATION RATE: 18 BRPM | TEMPERATURE: 98 F | SYSTOLIC BLOOD PRESSURE: 135 MMHG

## 2020-07-11 DIAGNOSIS — Z20.822 SUSPECTED COVID-19 VIRUS INFECTION: Primary | ICD-10-CM

## 2020-07-11 DIAGNOSIS — U07.1 COVID-19 VIRUS DETECTED: ICD-10-CM

## 2020-07-11 PROCEDURE — 99203 OFFICE O/P NEW LOW 30 MIN: CPT | Mod: S$GLB,,, | Performed by: PHYSICIAN ASSISTANT

## 2020-07-11 PROCEDURE — 99203 PR OFFICE/OUTPT VISIT, NEW, LEVL III, 30-44 MIN: ICD-10-PCS | Mod: S$GLB,,, | Performed by: PHYSICIAN ASSISTANT

## 2020-07-11 PROCEDURE — U0003 INFECTIOUS AGENT DETECTION BY NUCLEIC ACID (DNA OR RNA); SEVERE ACUTE RESPIRATORY SYNDROME CORONAVIRUS 2 (SARS-COV-2) (CORONAVIRUS DISEASE [COVID-19]), AMPLIFIED PROBE TECHNIQUE, MAKING USE OF HIGH THROUGHPUT TECHNOLOGIES AS DESCRIBED BY CMS-2020-01-R: HCPCS

## 2020-07-11 NOTE — PROGRESS NOTES
Subjective:        Time seen by provider: 3:12 PM on 07/11/2020    Jules Floyd Jr. is a 49 y.o. male with PMHx of DM and HTN who presents for an evaluation of possible COVID-19. The patient began experiencing fever last night, generalized body aches, and sore throat. He took an Madeline Rosalie this morning which provided transient relief; however, symptoms have recurred, and patient is additionally experiencing HA at this time. He denies any other. Patient reports exposure to coworkers who have recently tested positive for COVID-19. No pertinent PSHx.     Review of Systems   Constitutional: Positive for fever. Negative for activity change, appetite change and fatigue.   HENT: Positive for sore throat. Negative for congestion and rhinorrhea.    Respiratory: Negative for cough, chest tightness, shortness of breath and wheezing.    Cardiovascular: Negative for chest pain and palpitations.   Gastrointestinal: Negative for diarrhea, nausea and vomiting.   Musculoskeletal: Positive for myalgias (generalized). Negative for arthralgias.   Skin: Negative for rash.   Neurological: Positive for headaches. Negative for weakness and light-headedness.       Objective:      Physical Exam  Vitals signs and nursing note reviewed.   Constitutional:       General: He is not in acute distress.     Appearance: He is well-developed. He is not diaphoretic.   HENT:      Head: Normocephalic and atraumatic.      Nose: Nose normal.   Eyes:      Conjunctiva/sclera: Conjunctivae normal.   Neck:      Musculoskeletal: Normal range of motion.   Cardiovascular:      Rate and Rhythm: Normal rate and regular rhythm.      Heart sounds: Normal heart sounds. No murmur.   Pulmonary:      Effort: Pulmonary effort is normal. No respiratory distress.      Breath sounds: Normal breath sounds. No wheezing.   Musculoskeletal: Normal range of motion.   Skin:     General: Skin is warm and dry.   Neurological:      Mental Status: He is alert and oriented to person,  place, and time.         Assessment:       Suspected COVID-19  COVID-19 Routine Screening     Plan:   1. Suspected COVID-19  COVID-19 Routine Screening     2. Discharge home and await results.   3. Return to clinic or ED for new or worsening symptoms.   4. Follow-up with PCP as needed.     Scribe Attestation:   I, Patsy Ventura, am scribing for, and in the presence of, Lita Andrew PA-C. I performed the above scribed service and the documentation accurately describes the services I performed. I attest to the accuracy of the note.    I, Lita Andrew PA-C, personally performed the services described in this documentation. All medical record entries made by the scribe were at my direction and in my presence.  I have reviewed the chart and agree that the record reflects my personal performance and is accurate and complete. Lita Andrew PA-C.  7:37 PM 07/11/2020

## 2020-07-12 LAB — SARS-COV-2 RNA RESP QL NAA+PROBE: DETECTED

## 2020-07-12 NOTE — PATIENT INSTRUCTIONS

## 2020-07-28 ENCOUNTER — OFFICE VISIT (OUTPATIENT)
Dept: ENDOCRINOLOGY | Facility: CLINIC | Age: 50
End: 2020-07-28
Payer: COMMERCIAL

## 2020-07-28 ENCOUNTER — TELEPHONE (OUTPATIENT)
Dept: ENDOCRINOLOGY | Facility: CLINIC | Age: 50
End: 2020-07-28

## 2020-07-28 VITALS
HEART RATE: 96 BPM | SYSTOLIC BLOOD PRESSURE: 120 MMHG | BODY MASS INDEX: 36.47 KG/M2 | WEIGHT: 246.25 LBS | TEMPERATURE: 98 F | HEIGHT: 69 IN | DIASTOLIC BLOOD PRESSURE: 88 MMHG

## 2020-07-28 DIAGNOSIS — E78.5 HYPERLIPIDEMIA, UNSPECIFIED HYPERLIPIDEMIA TYPE: ICD-10-CM

## 2020-07-28 DIAGNOSIS — E83.52 HYPERCALCEMIA: ICD-10-CM

## 2020-07-28 DIAGNOSIS — I10 ESSENTIAL HYPERTENSION: ICD-10-CM

## 2020-07-28 DIAGNOSIS — Z79.4 TYPE 2 DIABETES MELLITUS WITHOUT COMPLICATION, WITH LONG-TERM CURRENT USE OF INSULIN: Primary | ICD-10-CM

## 2020-07-28 DIAGNOSIS — E11.9 TYPE 2 DIABETES MELLITUS WITHOUT COMPLICATION, WITH LONG-TERM CURRENT USE OF INSULIN: Primary | ICD-10-CM

## 2020-07-28 PROCEDURE — 99999 PR PBB SHADOW E&M-EST. PATIENT-LVL V: ICD-10-PCS | Mod: PBBFAC,,, | Performed by: PHYSICIAN ASSISTANT

## 2020-07-28 PROCEDURE — 99204 OFFICE O/P NEW MOD 45 MIN: CPT | Mod: S$GLB,,, | Performed by: PHYSICIAN ASSISTANT

## 2020-07-28 PROCEDURE — 99999 PR PBB SHADOW E&M-EST. PATIENT-LVL V: CPT | Mod: PBBFAC,,, | Performed by: PHYSICIAN ASSISTANT

## 2020-07-28 PROCEDURE — 99204 PR OFFICE/OUTPT VISIT, NEW, LEVL IV, 45-59 MIN: ICD-10-PCS | Mod: S$GLB,,, | Performed by: PHYSICIAN ASSISTANT

## 2020-07-28 RX ORDER — INSULIN DEGLUDEC 100 U/ML
INJECTION, SOLUTION SUBCUTANEOUS
Qty: 54 ML | Refills: 3 | Status: ON HOLD | OUTPATIENT
Start: 2020-07-28 | End: 2022-08-25

## 2020-07-28 RX ORDER — METFORMIN HYDROCHLORIDE 1000 MG/1
1000 TABLET ORAL 2 TIMES DAILY WITH MEALS
Qty: 180 TABLET | Refills: 3 | Status: SHIPPED | OUTPATIENT
Start: 2020-07-28 | End: 2023-10-23

## 2020-07-28 NOTE — PROGRESS NOTES
"CC: This 49 y.o. male presents for management of Diabetes Mellitus  and chronic conditions pending review including HTN, HLP    HPI: was diagnosed with T2DM ~4 years ago. New to endocrine. Pt's girlfriend is present on phone. Pt recently had COVID on .  Has never been hospitalized r/t DM.  Family hx of DM: mother, father  Fhx of thyroid disease:aunts  Denies missing doses of DM medication.   Hypoglycemia at home: no  monitoring BG at home:  Fastin-300  LH:190  DN: 225    Diet:   BF-wheaties or toast w/ butter, deer sausage  LH-sandwich, leftovers  DN-pork chops, corn  No snacks.  Avoids sugary beverages.     Exercise: none    CURRENT DM MEDS: Novolog 70/30 60 TID AC, Novolog ss 100 (1 hour after eating), Jardiance 25 mg daily  Timing prandial insulin 5-15 minutes before meals:  Vial/pen:  Uses   Glucometer type:  prodigy    Previous meds:  Januvia-previous provider  trulicity- previous changed  Januvia-previously changed    Standards of Care:  Eye exam: [facility]  Podiatry exam:   DE: never    PMHx, PSHx: reviewed in epic.  Social Hx: no E/T use.    ROS:   Gen: Appetite good, + weight gain (14 lbs) , denies fatigue and weakness.  Skin: Skin is intact and heals well, no rashes, no hair changes  Eyes: Denies visual disturbances  Resp: no SOB or BUENROSTRO, no cough  Cardiac: No palpitations, chest pain, no edema   GI: No nausea or vomiting, diarrhea, constipation, or abdominal pain.  /GYN: No nocturia, burning or pain.   MS/Neuro: Denies numbness/ tingling in BLE; Gait steady, speech clear  Psych: Denies drug/ETOH abuse, no hx of depression.  Other systems: negative.    /88 (BP Location: Right arm, Patient Position: Sitting, BP Method: Large (Manual))   Pulse 96   Temp 97.6 °F (36.4 °C) (Oral)   Ht 5' 9" (1.753 m)   Wt 111.7 kg (246 lb 4.1 oz)   BMI 36.37 kg/m²      PE:  GENERAL: middle aged male, well developed, well nourished.  PSYCH: AAOx3, appropriate mood and affect, pleasant " expression, conversant, appears relaxed, well groomed.   EYES: Conjunctiva, corneas clear  NECK: Supple, trachea midline,no thyromegaly or nodules  CHEST: Resp even and unlabored, CTA bilateral.  CARDIAC: RRR, S1, S2 heard, no murmurs  ABDOMEN: Soft, non-tender, non-distended. Obese abdomen.  VASCULAR: DP pulses +2/4 bilaterally, no edema.  NEURO: Gait steady, CN ll-Xll grossly intact  SKIN: Skin warm and dry no acanthosis nigracans.  Foot Exam: appropriate footwear.    Personally reviewed labs below:    Office Visit on 07/11/2020   Component Date Value Ref Range Status    SARS-CoV2 (COVID-19) Qualitative P* 07/11/2020 Detected* Not Detected Final    Comment: This test utilizes a real-time reverse transcription  polymerase chain reaction procedure to amplify and   detect the SARS-CoV-2 RdRp and N genes.    The analytical sensitivity (limit of detection) of   this assay is 100 copies/mL.   A Detected result is considered positive for COVID-19.  This patient is considered infected with the   SARS-CoV-2 virus and is presumed to be contagious.    A Not Detected result means that SARS-CoV-2 RNA is not  present above the limit of detection. It does not rule  out the possibility of COVID-19 and should not be the  sole basis for treatment decisions.  If COVID-19 is   strongly suspected based on clinical and exposure   history,re-testing should be considered.    This test is only for use under Food and Drug   Administration s Emergency Use Authorization (EUA).   Commercial reagents are provided by [employer].  Performance characteristics of the EUA have been   independently verified by [facility]   Department of Pathology and L                           aboratory Medicine.           ASSESSMENT and PLAN:    1. Type 2 diabetes mellitus without complication, with long-term current use of insulin  Lipid Panel    Comprehensive metabolic panel    T4, free    TSH    Hemoglobin A1C    Microalbumin/creatinine  urine ratio    insulin degludec (TRESIBA FLEXTOUCH U-100) 100 unit/mL (3 mL) InPn    metFORMIN (GLUCOPHAGE) 1000 MG tablet    Ambulatory referral/consult to Diabetes Education    Ambulatory referral/consult to Diabetes Education   2. Essential hypertension     3. Hyperlipidemia, unspecified hyperlipidemia type     4. Hypercalcemia  PTH, intact    Calcium, Ionized    Renal function panel     T2DM with hyperglycemia-A1c today. Start Tresiba 60 units nightly. Increase Novolog to 30 units with meals plus the sliding scale. Continue Jardiance. Start Metformin 1000 mg twice daily. Stop Novolog 70/30. Wt based dose on 1.2 units/kg/day. Pt wants an insulin pump because this would be easier for his line of work.     Discussed DM, progression of disease, long term complications, tx options.   Discussed A1c and BG goals.   Reviewed  hypoglycemia, s/s and appropriate tx.   Instructed to monitor BG and bring meter/ log to every clinic visit.   - takes ASA, ACEi, statin    HTN - controlled, continue meds as previously prescribed and monitor.     HLP - LDL , on statin therapy, LFTs WNL       Follow-up: in 3 months with lab prior

## 2020-07-28 NOTE — TELEPHONE ENCOUNTER
Called pt wife back and informed her to have pt come in for 11:30am for scheduled visit.  Pt was rearranged from yesterday due to timeframe for [facility]ANDO.  Eunice stated she would see pt at this time even though it is not a NP slot. Wife verbalized understanding.      ----- Message from Melinda Connor sent at 7/28/2020  8:41 AM CDT -----  Regarding: appt  Contact: pt's wife Yesenia  Appointment     Patient's wife Yesenia called her  has taken off for his 11 a,m appt for today .  They are upset his appt keeps getting changed.   They are asking for a call back  [phone]

## 2020-07-28 NOTE — PATIENT INSTRUCTIONS
Start Tresiba 60 units nightly. Increase Novolog to 30 units with meals plus the sliding scale. Continue Jardiance. Start Metformin 1000 mg twice daily. Stop Novolog 70/30.

## 2020-07-30 RX ORDER — ATORVASTATIN CALCIUM 80 MG/1
80 TABLET, FILM COATED ORAL DAILY
Qty: 90 TABLET | Refills: 3 | Status: ON HOLD | OUTPATIENT
Start: 2020-07-30 | End: 2022-08-25

## 2020-08-10 ENCOUNTER — CLINICAL SUPPORT (OUTPATIENT)
Dept: DIABETES | Facility: CLINIC | Age: 50
End: 2020-08-10
Payer: COMMERCIAL

## 2020-08-10 VITALS — WEIGHT: 246.25 LBS | BODY MASS INDEX: 36.47 KG/M2 | HEIGHT: 69 IN

## 2020-08-10 DIAGNOSIS — Z79.4 DIABETES MELLITUS DUE TO UNDERLYING CONDITION WITH HYPEROSMOLARITY WITHOUT COMA, WITH LONG-TERM CURRENT USE OF INSULIN: ICD-10-CM

## 2020-08-10 DIAGNOSIS — E08.00 DIABETES MELLITUS DUE TO UNDERLYING CONDITION WITH HYPEROSMOLARITY WITHOUT COMA, WITH LONG-TERM CURRENT USE OF INSULIN: ICD-10-CM

## 2020-08-10 PROCEDURE — 99999 PR PBB SHADOW E&M-EST. PATIENT-LVL III: ICD-10-PCS | Mod: PBBFAC,,, | Performed by: DIETITIAN, REGISTERED

## 2020-08-10 PROCEDURE — G0108 PR DIAB MANAGE TRN  PER INDIV: ICD-10-PCS | Mod: S$GLB,,, | Performed by: DIETITIAN, REGISTERED

## 2020-08-10 PROCEDURE — 99999 PR PBB SHADOW E&M-EST. PATIENT-LVL III: CPT | Mod: PBBFAC,,, | Performed by: DIETITIAN, REGISTERED

## 2020-08-10 PROCEDURE — G0108 DIAB MANAGE TRN  PER INDIV: HCPCS | Mod: S$GLB,,, | Performed by: DIETITIAN, REGISTERED

## 2020-08-10 NOTE — PROGRESS NOTES
Diabetes Education  Author: Salma Newell RD, CDE  Date: 8/10/2020    Diabetes Care Management Summary  Diabetes Education Record Assessment/Progress: Initial  Current Diabetes Risk Level: Moderate     Last A1c:   Lab Results   Component Value Date    HGBA1C 10.3 (H) 07/28/2020     Last visit with Diabetes Educator: : 08/10/2020      Diabetes Type  Diabetes Type : Type II    Diabetes History  Diabetes Diagnosis: 3-5 years  Current Treatment: Oral Medication, Insulin(1000mg Metformin BID with Jardiance in am and 20-30 Units of Novolog with meals.  Not taking Tresiba because reports he was having weight on his chest after taking)  Reviewed Problem List with Patient: Yes    Health Maintenance was reviewed today with patient. Discussed with patient importance of routine eye exams, foot exams/foot care, blood work (i.e.: A1c, microalbumin, and lipid), dental visits, yearly flu vaccine, and pneumonia vaccine as indicated by PCP. Patient verbalized understanding.     Health Maintenance Topics with due status: Not Due       Topic Last Completion Date    Foot Exam 07/28/2020    Lipid Panel 07/28/2020    Hemoglobin A1c 07/28/2020    Influenza Vaccine Not Due     Health Maintenance Due   Topic Date Due    Hepatitis C Screening  1970    Eye Exam  09/15/1979    HIV Screening  09/15/1984    TETANUS VACCINE  09/15/1987    Pneumococcal Vaccine (Medium Risk) (1 of 1 - PPSV23) 09/15/1988    Shingles Vaccine (1 of 2) 09/15/2019    Colorectal Cancer Screening  09/15/2019       Nutrition  Meal Planning: skipping meals, water, snacks between meal  What type of sweetener do you use?: none  What type of beverages do you drink?: diet soda/tea, water, milk(Reports drinking lots of milk)  Meal Plan 24 Hour Recall - Breakfast: (Grits and sausage usually but on way to visit had a peach)  Meal Plan 24 Hour Recall - Lunch: (Usually a salad and has been drinking it with BRANDY)  Meal Plan 24 Hour Recall - Dinner: (Last night had Hot  Dogs and Chilli with diet tea)  Meal Plan 24 Hour Recall - Snack: (Sf Wafers)    Monitoring   Monitoring: Other  Self Monitoring : (Reports Blood sugar this am 223 at lunch yesterday was 170 and dinner last night was 210  At time of visit blood sugar 190)  Blood Glucose Logs: No  Do you use a personal continuous glucose monitor?: No  In the last month, how often have you had a low blood sugar reaction?: never  Can you tell when your blood sugar is too high?: no    Exercise   Exercise Type: none    Current Diabetes Treatment   Current Treatment: Oral Medication, Insulin(1000mg Metformin BID with Jardiance in am and 20-30 Units of Novolog with meals.  Not taking Tresiba because reports he was having weight on his chest after taking)    Social History  Preferred Learning Method: Face to Face, Demonstration, Hands On, Reading Materials  Primary Support: Self, Spouse, Other(Girlfriend Yesenia present during visit)  Educational Level: Trade School  Occupation: (Works as  at [employer])  Smoking Status: Never a Smoker  Alcohol Use: Weekly    Barriers to Change  Barriers to Change: None  Learning Challenges : None    Readiness to Learn   Readiness to Learn : Eager    Cultural Influences  Cultural Influences: None    Diabetes Education Assessment/Progress  Diabetes Disease Process (diabetes disease process and treatment options): Discussion  Nutrition (Incorporating nutritional management into one's lifestyle): Discussion, Instructed, Comprehends Key Points, Demonstrates Understanding/Competency (verbalizes/demonstrates), Written Materials Provided(Educated patient on plate method with carb limit set to not exceed 45 grams.  Educated patient on how macronutrients effect blood sugars)  Physical Activity (incorporating physical activity into one's lifestyle): Discussion  Medications (states correct name, dose, onset, peak, duration, side effects & timing of meds): Discussion, Instructed, Demonstrates  Understanding/Competency(verbalizes/demonstrates), Comprehends Key Points, Written Materials Provided(Educated patient on how oral DM and basal and prandial insulins work.  Would consider restrating GLP-1 weekly)  Monitoring (monitoring blood glucose/other parameters & using results): Discussion, Instructed, Demonstrates Understanding/Competency (verbalizes/demonstrates), Comprehends Key Points, Not Covered/Deferred(Provided log for patient to record blood sugars and insulin doses)  Acute Complications (preventing, detecting, and treating acute complications): Discussion  Chronic Complications (preventing, detecting, and treating chronic complications): Discussion  Clinical (diabetes, other pertinent medical history, and relevant comorbidities reviewed during visit): Discussion  Cognitive (knowledge of self-management skills, functional health literacy): Discussion  Psychosocial (emotional response to diabetes): Discussion  Diabetes Distress and Support Systems: Discussion  Behavioral (readiness for change, lifestyle practices, self-care behaviors): Discussion    SLIDING SCALE INSULIN EXERCISES    Take Tresiba 20 Units same time every night 9pm  Take Metformin with food at Breakfast and Dinner  Take Novolog 5-10 Units with all meals    SLIDING SCALE EXAMPLE -  1) Use the scale your doctor gives you ONLY at meals.  2) A sliding scale should only be used with Novolog,      BLOOD GLUCOSE LEVEL     INSULIN  < 80... Treat low blood sugar (eat 15 grams of carb)    81 - 150. No extra insulin    151 - 200.. ADD 2 units of extra insulin    201 - 250.. ADD 4 units of extra insulin    251 - 300.. ADD 6 units of extra insulin    301 -350 .. ADD 8 units of extra insulin    ? 351 ADD 10  units of extra insulin    Goals  Patient has selected/evaluated goals during today's session: Yes, selected  Healthy Eating: Set  Start Date: 08/10/20  Target  Date: 11/10/20  Physical Activity: Set  Start Date: 08/10/20  Target Date: 11/10/20  Monitoring: Set  Start Date: 08/10/20  Target Date: 11/10/20  Medications: Set  Start Date: 08/10/20  Target Date: 11/20/20  Problem Solving: Set  Start Date: 08/10/20  Target Date: 11/20/20  Healthy Coping: In Progress  Reducing Risks: In Progress    Diabetes Self-Management Support Plan  Review Status: Patient has selected and agrees to support plan.    Diabetes Care Plan/Intervention  Education Plan/Intervention: Individual Follow-Up DSMT(Will follow up with blood sugar logs, also would consider VGO)    Diabetes Meal Plan  Restrictions: Restricted Carbohydrate  Calories: 1600  Carbohydrate Per Meal: 45-60g, 30-45g  Carbohydrate Per Snack : 7-15g  Fat: (Reduce intake of saturated fats)  Protein: (Lean protein with meals and snacks)    Today's Self-Management Care Plan was developed with the patient's input and is based on barriers identified during today's assessment.    The long and short-term goals in the care plan were written with the patient/caregiver's input. The patient has agreed to work toward these goals to improve his overall diabetes control.      The patient received a copy of today's self-management plan and verbalized understanding of the care plan, goals, and all of today's instructions.      The patient was encouraged to communicate with his physician and care team regarding his condition(s) and treatment.  I provided the patient with my contact information today and encouraged him to contact me via phone or patient portal as needed.     Education Units of Time   Time Spent: 60 min

## 2020-09-02 ENCOUNTER — OFFICE VISIT (OUTPATIENT)
Dept: ENDOCRINOLOGY | Facility: CLINIC | Age: 50
End: 2020-09-02
Payer: COMMERCIAL

## 2020-09-02 VITALS
BODY MASS INDEX: 37.62 KG/M2 | DIASTOLIC BLOOD PRESSURE: 90 MMHG | HEART RATE: 86 BPM | HEIGHT: 69 IN | TEMPERATURE: 98 F | WEIGHT: 254 LBS | SYSTOLIC BLOOD PRESSURE: 140 MMHG

## 2020-09-02 DIAGNOSIS — Z79.4 TYPE 2 DIABETES MELLITUS WITHOUT COMPLICATION, WITH LONG-TERM CURRENT USE OF INSULIN: Primary | ICD-10-CM

## 2020-09-02 DIAGNOSIS — E11.9 TYPE 2 DIABETES MELLITUS WITHOUT COMPLICATION, WITH LONG-TERM CURRENT USE OF INSULIN: Primary | ICD-10-CM

## 2020-09-02 DIAGNOSIS — I10 ESSENTIAL HYPERTENSION: ICD-10-CM

## 2020-09-02 DIAGNOSIS — E78.5 HYPERLIPIDEMIA, UNSPECIFIED HYPERLIPIDEMIA TYPE: ICD-10-CM

## 2020-09-02 PROCEDURE — 99213 PR OFFICE/OUTPT VISIT, EST, LEVL III, 20-29 MIN: ICD-10-PCS | Mod: S$GLB,,, | Performed by: PHYSICIAN ASSISTANT

## 2020-09-02 PROCEDURE — 99999 PR PBB SHADOW E&M-EST. PATIENT-LVL V: CPT | Mod: PBBFAC,,, | Performed by: PHYSICIAN ASSISTANT

## 2020-09-02 PROCEDURE — 99999 PR PBB SHADOW E&M-EST. PATIENT-LVL V: ICD-10-PCS | Mod: PBBFAC,,, | Performed by: PHYSICIAN ASSISTANT

## 2020-09-02 PROCEDURE — 99213 OFFICE O/P EST LOW 20 MIN: CPT | Mod: S$GLB,,, | Performed by: PHYSICIAN ASSISTANT

## 2020-09-02 RX ORDER — FLASH GLUCOSE SENSOR
KIT MISCELLANEOUS
Qty: 6 KIT | Refills: 3 | Status: SHIPPED | OUTPATIENT
Start: 2020-09-02 | End: 2021-02-01

## 2020-09-02 RX ORDER — INSULIN ASPART 100 [IU]/ML
INJECTION, SOLUTION INTRAVENOUS; SUBCUTANEOUS
Qty: 30 ML | Refills: 11 | Status: ON HOLD | OUTPATIENT
Start: 2020-09-02 | End: 2022-08-25

## 2020-09-02 NOTE — PROGRESS NOTES
"CC: This 49 y.o. male presents for management of Diabetes Mellitus  and chronic conditions pending review including HTN, HLP    HPI: was diagnosed with T2DM ~4 years ago.  Pt is here for a log review.  Pt recently had COVID on . Pt is interested in the Omnipod Dash.  Has never been hospitalized r/t DM.  Family hx of DM: mother, father  Fhx of thyroid disease:aunts  Denies missing doses of DM medication.   Hypoglycemia at home: no  monitoring BG at home 4x daily:  Fastin-300  LH:200s  DN: 200s    Diet:   BF-wheaties or toast w/ butter, deer sausage  LH-sandwich, leftovers  DN-pork chops, corn  No snacks.  Avoids sugary beverages.     Exercise: He has been walking at work.     CURRENT DM MEDS: Tresiba 38 units nightly, Novolog 12-16 units AC plus ss 150/25, Jardiance 25 mg daily   Timing prandial insulin 5-15 minutes before meals: yes  Glucometer type:  prodigy    Previous meds:  Januvia-previous provider  trulicity- previous changed  Januvia-previously changed    Standards of Care:  Eye exam: [facility]  Podiatry exam:   DE: never    PMHx, PSHx: reviewed in epic.  Social Hx: no E/T use.    ROS:   Gen: Appetite good, + weight gain (7 lbs) , denies fatigue and weakness.  Skin: Skin is intact and heals well, no rashes, no hair changes  Eyes: Denies visual disturbances  Resp: no SOB or BUENROSTRO, no cough  Cardiac: No palpitations, chest pain, no edema   GI: No nausea or vomiting, diarrhea, constipation, or abdominal pain.  /GYN: No nocturia, burning or pain.   MS/Neuro: Denies numbness/ tingling in BLE; Gait steady, speech clear  Psych: Denies drug/ETOH abuse, no hx of depression.  Other systems: negative.    BP (!) 140/90 (BP Location: Left arm, Patient Position: Sitting, BP Method: Large (Manual))   Pulse 86   Temp 97.8 °F (36.6 °C) (Temporal)   Ht 5' 9" (1.753 m)   Wt 115.2 kg (253 lb 15.5 oz)   BMI 37.50 kg/m²      PE:  GENERAL: middle aged male, well developed, well nourished.  PSYCH: AAOx3, " appropriate mood and affect, pleasant expression, conversant, appears relaxed, well groomed.   EYES: Conjunctiva, corneas clear  NECK: Supple, trachea midline,no thyromegaly or nodules  CHEST: Resp even and unlabored, CTA bilateral.  CARDIAC: RRR, S1, S2 heard, no murmurs  ABDOMEN: Soft, non-tender, non-distended. Obese abdomen.  VASCULAR: DP pulses +2/4 bilaterally, no edema.  NEURO: Gait steady, CN ll-Xll grossly intact  SKIN: Skin warm and dry no acanthosis nigracans.  Foot Exam: appropriate footwear.    Personally reviewed labs below:    No visits with results within 1 Month(s) from this visit.   Latest known visit with results is:   Lab Visit on 07/28/2020   Component Date Value Ref Range Status    Cholesterol 07/28/2020 250* 120 - 199 mg/dL Final    Comment: The National Cholesterol Education Program (NCEP) has set the  following guidelines (reference ranges) for Cholesterol:  Optimal.....................<200 mg/dL  Borderline High.............200-239 mg/dL  High........................> or = 240 mg/dL      Triglycerides 07/28/2020 303* 30 - 150 mg/dL Final    Comment: The National Cholesterol Education Program (NCEP) has set the  following guidelines (reference values) for triglycerides:  Normal......................<150 mg/dL  Borderline High.............150-199 mg/dL  High........................200-499 mg/dL      HDL 07/28/2020 63  40 - 75 mg/dL Final    Comment: The National Cholesterol Education Program (NCEP) has set the  following guidelines (reference values) for HDL Cholesterol:  Low...............<40 mg/dL  Optimal...........>60 mg/dL      LDL Cholesterol 07/28/2020 126.4  63.0 - 159.0 mg/dL Final    Comment: The National Cholesterol Education Program (NCEP) has set the  following guidelines (reference values) for LDL Cholesterol:  Optimal.......................<130 mg/dL  Borderline High...............130-159 mg/dL  High..........................160-189 mg/dL  Very High.....................>190  mg/dL      Hdl/Cholesterol Ratio 07/28/2020 25.2  20.0 - 50.0 % Final    Total Cholesterol/HDL Ratio 07/28/2020 4.0  2.0 - 5.0 Final    Non-HDL Cholesterol 07/28/2020 187  mg/dL Final    Comment: Risk category and Non-HDL cholesterol goals:  Coronary heart disease (CHD)or equivalent (10-year risk of CHD >20%):  Non-HDL cholesterol goal     <130 mg/dL  Two or more CHD risk factors and 10-year risk of CHD <= 20%:  Non-HDL cholesterol goal     <160 mg/dL  0 to 1 CHD risk factor:  Non-HDL cholesterol goal     <190 mg/dL      Sodium 07/28/2020 139  136 - 145 mmol/L Final    Potassium 07/28/2020 4.4  3.5 - 5.1 mmol/L Final    Chloride 07/28/2020 101  95 - 110 mmol/L Final    CO2 07/28/2020 28  23 - 29 mmol/L Final    Glucose 07/28/2020 208* 70 - 110 mg/dL Final    BUN, Bld 07/28/2020 13  6 - 20 mg/dL Final    Creatinine 07/28/2020 1.1  0.5 - 1.4 mg/dL Final    Calcium 07/28/2020 11.0* 8.7 - 10.5 mg/dL Final    Total Protein 07/28/2020 7.9  6.0 - 8.4 g/dL Final    Albumin 07/28/2020 4.0  3.5 - 5.2 g/dL Final    Total Bilirubin 07/28/2020 0.4  0.1 - 1.0 mg/dL Final    Comment: For infants and newborns, interpretation of results should be based  on gestational age, weight and in agreement with clinical  observations.  Premature Infant recommended reference ranges:  Up to 24 hours.............<8.0 mg/dL  Up to 48 hours............<12.0 mg/dL  3-5 days..................<15.0 mg/dL  6-29 days.................<15.0 mg/dL      Alkaline Phosphatase 07/28/2020 91  55 - 135 U/L Final    AST 07/28/2020 14  10 - 40 U/L Final    ALT 07/28/2020 24  10 - 44 U/L Final    Anion Gap 07/28/2020 10  8 - 16 mmol/L Final    eGFR if African American 07/28/2020 >60.0  >60 mL/min/1.73 m^2 Final    eGFR if non African American 07/28/2020 >60.0  >60 mL/min/1.73 m^2 Final    Comment: Calculation used to obtain the estimated glomerular filtration  rate (eGFR) is the CKD-EPI equation.       Free T4 07/28/2020 0.97  0.71 - 1.51  ng/dL Final    TSH 07/28/2020 1.310  0.400 - 4.000 uIU/mL Final    Hemoglobin A1C 07/28/2020 10.3* 4.0 - 5.6 % Final    Comment: ADA Screening Guidelines:  5.7-6.4%  Consistent with prediabetes  >or=6.5%  Consistent with diabetes  High levels of fetal hemoglobin interfere with the HbA1C  assay. Heterozygous hemoglobin variants (HbS, HgC, etc)do  not significantly interfere with this assay.   However, presence of multiple variants may affect accuracy.      Estimated Avg Glucose 07/28/2020 249* 68 - 131 mg/dL Final       ASSESSMENT and PLAN:    1. Type 2 diabetes mellitus without complication, with long-term current use of insulin  Renal function panel    Hemoglobin A1C    Ambulatory referral/consult to Diabetes Education   2. Essential hypertension     3. Hyperlipidemia, unspecified hyperlipidemia type       T2DM with hyperglycemia-Readings are still elevated. Start Omnipod Dash. VGo was not covered. Stop Tresiba and Novolog injections. Start Ozempic 0.25 mg once weekly x 4 weeks. At week 5 increase to 0.5 mg weekly.     No fmh MEN or medullary thyroid cancer  No personal hx of pancreatitis        Discussed DM, progression of disease, long term complications, tx options.   Discussed A1c and BG goals.   Reviewed  hypoglycemia, s/s and appropriate tx.   Instructed to monitor BG and bring meter/ log to every clinic visit.   - takes ASA, ACEi, statin    HTN - controlled, continue meds as previously prescribed and monitor.     HLP - LDL , on statin therapy, LFTs WNL     Follow-up: in 3 months with lab prior     Pump Settings   Basal   MN 0.75 u/hr  CHO: 8  ISF: 30  IOB: 3 hours  Target: 100-120

## 2020-09-02 NOTE — PATIENT INSTRUCTIONS
Increase Tresiba to 48 units nightly. INcrease Novolog to 15 units plus the sliding scale. Continue Jardiance. Switch to VGO 40 when you receive the device and 4 clicks with meals.

## 2020-09-08 DIAGNOSIS — Z79.4 TYPE 2 DIABETES MELLITUS WITHOUT COMPLICATION, WITH LONG-TERM CURRENT USE OF INSULIN: Primary | ICD-10-CM

## 2020-09-08 DIAGNOSIS — E11.9 TYPE 2 DIABETES MELLITUS WITHOUT COMPLICATION, WITH LONG-TERM CURRENT USE OF INSULIN: Primary | ICD-10-CM

## 2020-09-08 RX ORDER — INSULIN PUMP CONTROLLER
EACH MISCELLANEOUS
Qty: 1 EACH | Refills: 0 | Status: ON HOLD | OUTPATIENT
Start: 2020-09-08 | End: 2022-08-25

## 2020-09-16 ENCOUNTER — TELEPHONE (OUTPATIENT)
Dept: ENDOCRINOLOGY | Facility: CLINIC | Age: 50
End: 2020-09-16

## 2020-09-16 NOTE — TELEPHONE ENCOUNTER
----- Message from Alannah Guzman sent at 9/15/2020  1:57 PM CDT -----  Type: Needs Medical Advice  Who Called:  Anton/ [facility] [employer]  Best Call Back Number: [phone]  Fax number: [phone]  Additional Information: wants to know if faxed was received for CMN and 2 office visit notes. Please give call back to confirm.  thanks

## 2021-02-24 ENCOUNTER — HOSPITAL ENCOUNTER (EMERGENCY)
Facility: HOSPITAL | Age: 51
Discharge: HOME | End: 2021-02-24
Attending: EMERGENCY MEDICINE
Payer: COMMERCIAL

## 2021-02-24 VITALS
WEIGHT: 250 LBS | RESPIRATION RATE: 31 BRPM | OXYGEN SATURATION: 95 % | SYSTOLIC BLOOD PRESSURE: 115 MMHG | BODY MASS INDEX: 37.03 KG/M2 | TEMPERATURE: 98 F | HEART RATE: 92 BPM | DIASTOLIC BLOOD PRESSURE: 85 MMHG | HEIGHT: 69 IN

## 2021-02-24 DIAGNOSIS — R50.9 FEVER, UNSPECIFIED FEVER CAUSE: Primary | ICD-10-CM

## 2021-02-24 LAB
ALBUMIN SERPL BCP-MCNC: 3.7 G/DL (ref 3.5–5.2)
ALP SERPL-CCNC: 60 U/L (ref 55–135)
ALT SERPL W/O P-5'-P-CCNC: 20 U/L (ref 10–44)
ANION GAP SERPL CALC-SCNC: 13 MMOL/L (ref 8–16)
AST SERPL-CCNC: 15 U/L (ref 10–40)
BACTERIA #/AREA URNS HPF: NEGATIVE /HPF
BASOPHILS # BLD AUTO: 0.06 K/UL (ref 0–0.2)
BASOPHILS NFR BLD: 0.6 % (ref 0–1.9)
BILIRUB SERPL-MCNC: 0.5 MG/DL (ref 0.1–1)
BILIRUB UR QL STRIP: NEGATIVE
BUN SERPL-MCNC: 19 MG/DL (ref 6–20)
CALCIUM SERPL-MCNC: 9.4 MG/DL (ref 8.7–10.5)
CHLORIDE SERPL-SCNC: 99 MMOL/L (ref 95–110)
CLARITY UR: CLEAR
CO2 SERPL-SCNC: 23 MMOL/L (ref 23–29)
COLOR UR: YELLOW
CREAT SERPL-MCNC: 1 MG/DL (ref 0.5–1.4)
DIFFERENTIAL METHOD: ABNORMAL
EOSINOPHIL # BLD AUTO: 0.5 K/UL (ref 0–0.5)
EOSINOPHIL NFR BLD: 4.3 % (ref 0–8)
ERYTHROCYTE [DISTWIDTH] IN BLOOD BY AUTOMATED COUNT: 14.3 % (ref 11.5–14.5)
EST. GFR  (AFRICAN AMERICAN): >60 ML/MIN/1.73 M^2
EST. GFR  (NON AFRICAN AMERICAN): >60 ML/MIN/1.73 M^2
GLUCOSE SERPL-MCNC: 219 MG/DL (ref 70–110)
GLUCOSE UR QL STRIP: ABNORMAL
HCT VFR BLD AUTO: 47.7 % (ref 40–54)
HGB BLD-MCNC: 15.2 G/DL (ref 14–18)
HGB UR QL STRIP: NEGATIVE
HYALINE CASTS #/AREA URNS LPF: 1 /LPF
IMM GRANULOCYTES # BLD AUTO: 0.07 K/UL (ref 0–0.04)
IMM GRANULOCYTES NFR BLD AUTO: 0.7 % (ref 0–0.5)
INFLUENZA A, MOLECULAR: NEGATIVE
INFLUENZA B, MOLECULAR: NEGATIVE
KETONES UR QL STRIP: NEGATIVE
LACTATE SERPL-SCNC: 2 MMOL/L (ref 0.5–1.9)
LACTATE SERPL-SCNC: 2.2 MMOL/L (ref 0.5–1.9)
LEUKOCYTE ESTERASE UR QL STRIP: NEGATIVE
LYMPHOCYTES # BLD AUTO: 1.4 K/UL (ref 1–4.8)
LYMPHOCYTES NFR BLD: 13.2 % (ref 18–48)
MCH RBC QN AUTO: 27.8 PG (ref 27–31)
MCHC RBC AUTO-ENTMCNC: 31.9 G/DL (ref 32–36)
MCV RBC AUTO: 87 FL (ref 82–98)
MICROSCOPIC COMMENT: NORMAL
MONOCYTES # BLD AUTO: 0.9 K/UL (ref 0.3–1)
MONOCYTES NFR BLD: 8.8 % (ref 4–15)
NEUTROPHILS # BLD AUTO: 7.5 K/UL (ref 1.8–7.7)
NEUTROPHILS NFR BLD: 72.4 % (ref 38–73)
NITRITE UR QL STRIP: NEGATIVE
NRBC BLD-RTO: 0 /100 WBC
PH UR STRIP: 5 [PH] (ref 5–8)
PLATELET # BLD AUTO: 276 K/UL (ref 150–350)
PMV BLD AUTO: 10.1 FL (ref 9.2–12.9)
POTASSIUM SERPL-SCNC: 4.2 MMOL/L (ref 3.5–5.1)
PROCALCITONIN SERPL IA-MCNC: <0.05 NG/ML (ref 0–0.5)
PROT SERPL-MCNC: 7 G/DL (ref 6–8.4)
PROT UR QL STRIP: NEGATIVE
RBC # BLD AUTO: 5.46 M/UL (ref 4.6–6.2)
RBC #/AREA URNS HPF: 0 /HPF (ref 0–4)
SARS-COV-2 RDRP RESP QL NAA+PROBE: NEGATIVE
SODIUM SERPL-SCNC: 135 MMOL/L (ref 136–145)
SP GR UR STRIP: >1.03 (ref 1–1.03)
SPECIMEN SOURCE: NORMAL
SQUAMOUS #/AREA URNS HPF: 0 /HPF
URN SPEC COLLECT METH UR: ABNORMAL
UROBILINOGEN UR STRIP-ACNC: NEGATIVE EU/DL
WBC # BLD AUTO: 10.41 K/UL (ref 3.9–12.7)
WBC #/AREA URNS HPF: 1 /HPF (ref 0–5)
YEAST URNS QL MICRO: NORMAL

## 2021-02-24 PROCEDURE — 87502 INFLUENZA DNA AMP PROBE: CPT

## 2021-02-24 PROCEDURE — 84145 PROCALCITONIN (PCT): CPT

## 2021-02-24 PROCEDURE — 96360 HYDRATION IV INFUSION INIT: CPT

## 2021-02-24 PROCEDURE — 80053 COMPREHEN METABOLIC PANEL: CPT

## 2021-02-24 PROCEDURE — 87040 BLOOD CULTURE FOR BACTERIA: CPT

## 2021-02-24 PROCEDURE — 85025 COMPLETE CBC W/AUTO DIFF WBC: CPT

## 2021-02-24 PROCEDURE — 96361 HYDRATE IV INFUSION ADD-ON: CPT

## 2021-02-24 PROCEDURE — 99284 EMERGENCY DEPT VISIT MOD MDM: CPT | Mod: 25

## 2021-02-24 PROCEDURE — 81001 URINALYSIS AUTO W/SCOPE: CPT

## 2021-02-24 PROCEDURE — U0002 COVID-19 LAB TEST NON-CDC: HCPCS

## 2021-02-24 PROCEDURE — 36415 COLL VENOUS BLD VENIPUNCTURE: CPT

## 2021-02-24 PROCEDURE — 83605 ASSAY OF LACTIC ACID: CPT | Mod: 91

## 2021-02-24 PROCEDURE — 25000003 PHARM REV CODE 250: Performed by: EMERGENCY MEDICINE

## 2021-02-24 RX ORDER — SODIUM CHLORIDE 9 MG/ML
INJECTION, SOLUTION INTRAVENOUS
Status: COMPLETED | OUTPATIENT
Start: 2021-02-24 | End: 2021-02-24

## 2021-02-24 RX ORDER — METOPROLOL SUCCINATE 100 MG/1
50 TABLET, EXTENDED RELEASE ORAL 2 TIMES DAILY
COMMUNITY

## 2021-02-24 RX ORDER — AMOXICILLIN AND CLAVULANATE POTASSIUM 875; 125 MG/1; MG/1
1 TABLET, FILM COATED ORAL 2 TIMES DAILY
Qty: 14 TABLET | Refills: 0 | Status: SHIPPED | OUTPATIENT
Start: 2021-02-24 | End: 2022-01-20

## 2021-02-24 RX ORDER — CETIRIZINE HYDROCHLORIDE 10 MG/1
10 TABLET ORAL NIGHTLY
Status: ON HOLD | COMMUNITY
End: 2022-08-25

## 2021-02-24 RX ADMIN — SODIUM CHLORIDE: 0.9 INJECTION, SOLUTION INTRAVENOUS at 01:02

## 2021-03-01 LAB
BACTERIA BLD CULT: NORMAL
BACTERIA BLD CULT: NORMAL

## 2021-04-29 ENCOUNTER — OFFICE VISIT (OUTPATIENT)
Dept: PODIATRY | Facility: CLINIC | Age: 51
End: 2021-04-29
Payer: COMMERCIAL

## 2021-04-29 ENCOUNTER — HOSPITAL ENCOUNTER (OUTPATIENT)
Dept: RADIOLOGY | Facility: CLINIC | Age: 51
Discharge: HOME | End: 2021-04-29
Attending: PODIATRIST
Payer: COMMERCIAL

## 2021-04-29 VITALS
DIASTOLIC BLOOD PRESSURE: 88 MMHG | HEART RATE: 94 BPM | WEIGHT: 250 LBS | BODY MASS INDEX: 37.03 KG/M2 | SYSTOLIC BLOOD PRESSURE: 133 MMHG | HEIGHT: 69 IN

## 2021-04-29 DIAGNOSIS — M79.671 BILATERAL FOOT PAIN: ICD-10-CM

## 2021-04-29 DIAGNOSIS — M72.2 PLANTAR FASCIITIS: Primary | ICD-10-CM

## 2021-04-29 DIAGNOSIS — E11.9 CONTROLLED TYPE 2 DIABETES MELLITUS WITHOUT COMPLICATION, WITH LONG-TERM CURRENT USE OF INSULIN: ICD-10-CM

## 2021-04-29 DIAGNOSIS — Z79.4 CONTROLLED TYPE 2 DIABETES MELLITUS WITHOUT COMPLICATION, WITH LONG-TERM CURRENT USE OF INSULIN: ICD-10-CM

## 2021-04-29 DIAGNOSIS — M79.672 BILATERAL FOOT PAIN: ICD-10-CM

## 2021-04-29 DIAGNOSIS — M79.672 HEEL PAIN, BILATERAL: ICD-10-CM

## 2021-04-29 DIAGNOSIS — M79.671 HEEL PAIN, BILATERAL: ICD-10-CM

## 2021-04-29 PROCEDURE — 99214 PR OFFICE/OUTPT VISIT, EST, LEVL IV, 30-39 MIN: ICD-10-PCS | Mod: S$GLB,,, | Performed by: PODIATRIST

## 2021-04-29 PROCEDURE — 99214 OFFICE O/P EST MOD 30 MIN: CPT | Mod: S$GLB,,, | Performed by: PODIATRIST

## 2021-04-29 PROCEDURE — 73630 XR FOOT COMPLETE 3 VIEW BILATERAL: ICD-10-PCS | Mod: 50,S$GLB,, | Performed by: RADIOLOGY

## 2021-04-29 PROCEDURE — 73630 X-RAY EXAM OF FOOT: CPT | Mod: 50,S$GLB,, | Performed by: RADIOLOGY

## 2021-04-29 RX ORDER — SILDENAFIL CITRATE 50 MG/1
50 TABLET, FILM COATED ORAL DAILY PRN
COMMUNITY
Start: 2021-03-29 | End: 2022-01-20

## 2021-04-29 RX ORDER — OXYCODONE AND ACETAMINOPHEN 10; 325 MG/1; MG/1
1 TABLET ORAL 2 TIMES DAILY PRN
COMMUNITY
Start: 2021-04-14

## 2021-04-29 RX ORDER — TESTOSTERONE CYPIONATE 200 MG/ML
INJECTION, SOLUTION INTRAMUSCULAR
COMMUNITY
Start: 2021-01-20 | End: 2022-01-20

## 2021-04-29 RX ORDER — MELOXICAM 15 MG/1
15 TABLET ORAL DAILY
Qty: 30 TABLET | Refills: 1 | Status: SHIPPED | OUTPATIENT
Start: 2021-04-29 | End: 2021-07-12

## 2021-04-30 ENCOUNTER — TELEPHONE (OUTPATIENT)
Dept: PODIATRY | Facility: CLINIC | Age: 51
End: 2021-04-30

## 2021-07-12 DIAGNOSIS — M79.672 HEEL PAIN, BILATERAL: ICD-10-CM

## 2021-07-12 DIAGNOSIS — M79.671 HEEL PAIN, BILATERAL: ICD-10-CM

## 2021-07-12 DIAGNOSIS — M79.672 BILATERAL FOOT PAIN: ICD-10-CM

## 2021-07-12 DIAGNOSIS — M79.671 BILATERAL FOOT PAIN: ICD-10-CM

## 2021-07-12 RX ORDER — MELOXICAM 15 MG/1
TABLET ORAL
Qty: 30 TABLET | Refills: 1 | Status: ON HOLD | OUTPATIENT
Start: 2021-07-12 | End: 2022-08-25

## 2022-01-20 ENCOUNTER — PATIENT MESSAGE (OUTPATIENT)
Dept: ENDOCRINOLOGY | Facility: CLINIC | Age: 52
End: 2022-01-20

## 2022-01-20 ENCOUNTER — OFFICE VISIT (OUTPATIENT)
Dept: ENDOCRINOLOGY | Facility: CLINIC | Age: 52
End: 2022-01-20
Payer: COMMERCIAL

## 2022-01-20 DIAGNOSIS — E11.9 TYPE 2 DIABETES MELLITUS WITHOUT COMPLICATION, WITH LONG-TERM CURRENT USE OF INSULIN: Primary | ICD-10-CM

## 2022-01-20 DIAGNOSIS — I10 ESSENTIAL HYPERTENSION: ICD-10-CM

## 2022-01-20 DIAGNOSIS — E78.5 HYPERLIPIDEMIA, UNSPECIFIED HYPERLIPIDEMIA TYPE: ICD-10-CM

## 2022-01-20 DIAGNOSIS — Z79.4 TYPE 2 DIABETES MELLITUS WITHOUT COMPLICATION, WITH LONG-TERM CURRENT USE OF INSULIN: Primary | ICD-10-CM

## 2022-01-20 PROCEDURE — 99213 OFFICE O/P EST LOW 20 MIN: CPT | Mod: 95,,, | Performed by: PHYSICIAN ASSISTANT

## 2022-01-20 PROCEDURE — 99213 PR OFFICE/OUTPT VISIT, EST, LEVL III, 20-29 MIN: ICD-10-PCS | Mod: 95,,, | Performed by: PHYSICIAN ASSISTANT

## 2022-01-20 NOTE — PROGRESS NOTES
CC: This 51 y.o. male presents for management of Diabetes Mellitus  and chronic conditions pending review including HTN, HLP    The patient location is: Home  The chief complaint leading to consultation is: T2DM    Visit type: audiovisual    Face to Face time with patient: 15 min  20 minutes of total time spent on the encounter, which includes face to face time and non-face to face time preparing to see the patient (eg, review of tests), Obtaining and/or reviewing separately obtained history, Documenting clinical information in the electronic or other health record, Independently interpreting results (not separately reported) and communicating results to the patient/family/caregiver, or Care coordination (not separately reported).     Each patient to whom he or she provides medical services by telemedicine is:  (1) informed of the relationship between the physician and patient and the respective role of any other health care provider with respect to management of the patient; and (2) notified that he or she may decline to receive medical services by telemedicine and may withdraw from such care at any time.    HPI: was diagnosed with T2DM ~4 years ago. Pt is planning on having the gastric sleeve surgery in two months.  Has never been hospitalized r/t DM.  Family hx of DM: mother, father  Fhx of thyroid disease:aunts  Denies missing doses of DM medication.   Hypoglycemia at home: no  monitoring BG at home 4x daily:  Fastins-130s    Diet:   BF-wheaties or toast w/ butter, deer sausage  LH-sandwich, leftovers  DN-pork chops, corn  No snacks.  Avoids sugary beverages.     Exercise: He has been walking at work.     CURRENT DM MEDS: Tresiba 54 units nightly, Novolog 15 units AC plus ss 150/25, Jardiance 25 mg daily, Metformin 1000 mg bid, Ozempic 1 mg weekly  Timing prandial insulin 5-15 minutes before meals: yes  Glucometer type:  prodigy    Previous meds:  Januvia-previous provider  trulicity- previous  changed  Januvia-previously changed    Standards of Care:  Eye exam: 2/21 [facility]  Podiatry exam: 7/21 Dr. Zepeda  DE: never    PMHx, PSHx: reviewed in epic.  Social Hx: no E/T use.    ROS:   Gen: Appetite good, weight stable , denies fatigue and weakness.  Skin: Skin is intact and heals well, no rashes, no hair changes  Eyes: Denies visual disturbances  Resp: no SOB or BUENROSTRO, no cough  Cardiac: No palpitations, chest pain, no edema   GI: No nausea or vomiting, diarrhea, constipation, or abdominal pain.  /GYN: No nocturia, burning or pain.   MS/Neuro: Denies numbness/ tingling in BLE; Gait steady, speech clear  Psych: Denies drug/ETOH abuse, no hx of depression.  Other systems: negative.    There were no vitals taken for this visit.     PE:  GENERAL: middle aged male, well developed, well nourished.  PSYCH: AAOx3, appropriate mood and affect, pleasant expression, conversant, appears relaxed, well groomed.   EYES: Conjunctiva, corneas clear  NEURO: Gait steady, CN ll-Xll grossly intact    Personally reviewed labs below:  10/21 Ina Ellsworth    PSA 0.61A1c 8.6%  Vitamin D 32  TSH 2.18  cmp wnl  No visits with results within 1 Month(s) from this visit.   Latest known visit with results is:   Admission on 02/24/2021, Discharged on 02/24/2021   Component Date Value Ref Range Status    Procalcitonin 02/24/2021 <0.05  0.00 - 0.50 ng/mL Final    Comment: Procalcitonin Result Interpretation:    <=0.50 ng/mL  Systemic infection (sepsis) is not likely. Local bacterial infection   is   possible.    >0.50 and <= 2.00 ng/mL  Systemic infection (sepsis) is possible, but other conditions are   also known   to elevate procalcitonin.     >2.00 ng/mL  Systemic infection (sepsis) is likely unless other causes are known.    >=10.00 ng/mL  Important systemic inflammatory response, almost exclusively due to   severe   bacterial sepsis or septic shock.      Blood Culture, Routine 02/24/2021 No growth after 5 days.    Final    Blood Culture, Routine 02/24/2021 No growth after 5 days.   Final    Lactate (Lactic Acid) 02/24/2021 2.2* 0.5 - 1.9 mmol/L Final    Comment: Falsely low lactic acid results can be found in samples   containing >=13.0 mg/dL total bilirubin and/or >=3.5 mg/dL   direct bilirubin.  Lactic Acid critical result(s) repeated. Called and verbal readback   obtained from Coral Hutchins NP ER.  by CW1 02/24/2021 12:45      Specimen UA 02/24/2021 Urine, Clean Catch   Final    Color, UA 02/24/2021 Yellow  Yellow, Straw, Darline Final    Appearance, UA 02/24/2021 Clear  Clear Final    pH, UA 02/24/2021 5.0  5.0 - 8.0 Final    Specific Gravity, UA 02/24/2021 >1.030* 1.005 - 1.030 Final    Protein, UA 02/24/2021 Negative  Negative Final    Comment: Recommend a 24 hour urine protein or a urine   protein/creatinine ratio if globulin induced proteinuria is  clinically suspected.      Glucose, UA 02/24/2021 4+* Negative Final    Ketones, UA 02/24/2021 Negative  Negative Final    Bilirubin (UA) 02/24/2021 Negative  Negative Final    Occult Blood UA 02/24/2021 Negative  Negative Final    Nitrite, UA 02/24/2021 Negative  Negative Final    Urobilinogen, UA 02/24/2021 Negative  Negative EU/dL Final    Leukocytes, UA 02/24/2021 Negative  Negative Final    WBC 02/24/2021 10.41  3.90 - 12.70 K/uL Final    RBC 02/24/2021 5.46  4.60 - 6.20 M/uL Final    Hemoglobin 02/24/2021 15.2  14.0 - 18.0 g/dL Final    Hematocrit 02/24/2021 47.7  40.0 - 54.0 % Final    MCV 02/24/2021 87  82 - 98 fL Final    MCH 02/24/2021 27.8  27.0 - 31.0 pg Final    MCHC 02/24/2021 31.9* 32.0 - 36.0 g/dL Final    RDW 02/24/2021 14.3  11.5 - 14.5 % Final    Platelets 02/24/2021 276  150 - 350 K/uL Final    MPV 02/24/2021 10.1  9.2 - 12.9 fL Final    Immature Granulocytes 02/24/2021 0.7* 0.0 - 0.5 % Final    Gran # (ANC) 02/24/2021 7.5  1.8 - 7.7 K/uL Final    Immature Grans (Abs) 02/24/2021 0.07* 0.00 - 0.04 K/uL Final    Comment:  Mild elevation in immature granulocytes is non specific and   can be seen in a variety of conditions including stress response,   acute inflammation, trauma and pregnancy. Correlation with other   laboratory and clinical findings is essential.      Lymph # 02/24/2021 1.4  1.0 - 4.8 K/uL Final    Mono # 02/24/2021 0.9  0.3 - 1.0 K/uL Final    Eos # 02/24/2021 0.5  0.0 - 0.5 K/uL Final    Baso # 02/24/2021 0.06  0.00 - 0.20 K/uL Final    nRBC 02/24/2021 0  0 /100 WBC Final    Gran % 02/24/2021 72.4  38.0 - 73.0 % Final    Lymph % 02/24/2021 13.2* 18.0 - 48.0 % Final    Mono % 02/24/2021 8.8  4.0 - 15.0 % Final    Eosinophil % 02/24/2021 4.3  0.0 - 8.0 % Final    Basophil % 02/24/2021 0.6  0.0 - 1.9 % Final    Differential Method 02/24/2021 Automated   Final    Sodium 02/24/2021 135* 136 - 145 mmol/L Final    Potassium 02/24/2021 4.2  3.5 - 5.1 mmol/L Final    Chloride 02/24/2021 99  95 - 110 mmol/L Final    CO2 02/24/2021 23  23 - 29 mmol/L Final    Glucose 02/24/2021 219* 70 - 110 mg/dL Final    BUN 02/24/2021 19  6 - 20 mg/dL Final    Creatinine 02/24/2021 1.0  0.5 - 1.4 mg/dL Final    Calcium 02/24/2021 9.4  8.7 - 10.5 mg/dL Final    Total Protein 02/24/2021 7.0  6.0 - 8.4 g/dL Final    Albumin 02/24/2021 3.7  3.5 - 5.2 g/dL Final    Total Bilirubin 02/24/2021 0.5  0.1 - 1.0 mg/dL Final    Comment: For infants and newborns, interpretation of results should be based  on gestational age, weight and in agreement with clinical  observations.    Premature Infant recommended reference ranges:  Up to 24 hours.............<8.0 mg/dL  Up to 48 hours............<12.0 mg/dL  3-5 days..................<15.0 mg/dL  6-29 days.................<15.0 mg/dL      Alkaline Phosphatase 02/24/2021 60  55 - 135 U/L Final    AST 02/24/2021 15  10 - 40 U/L Final    ALT 02/24/2021 20  10 - 44 U/L Final    Anion Gap 02/24/2021 13  8 - 16 mmol/L Final    eGFR if African American 02/24/2021 >60.0  >60 mL/min/1.73 m^2  Final    eGFR if non African American 02/24/2021 >60.0  >60 mL/min/1.73 m^2 Final    Comment: Calculation used to obtain the estimated glomerular filtration  rate (eGFR) is the CKD-EPI equation.       Influenza A, Molecular 02/24/2021 Negative  Negative Final    Influenza B, Molecular 02/24/2021 Negative  Negative Final    Flu A & B Source 02/24/2021 Nasal swab   Final    SARS-CoV-2 RNA, Amplification, Qual 02/24/2021 Negative  Negative Final    Comment: This test utilizes isothermal nucleic acid amplification   technology to detect the SARS-CoV-2 RdRp nucleic acid segment.   The analytical sensitivity (limit of detection) is 125 genome   equivalents/mL.     A POSITIVE result implies infection with the SARS-CoV-2 virus;  the patient is presumed to be contagious.    A NEGATIVE result means that SARS-CoV-2 nucleic acids are not  present above the limit of detection. A NEGATIVE result should be   treated as presumptive. It does not rule out the possibility of   COVID-19 and should not be the sole basis for treatment decisions.   If COVID-19 is strongly suspected based on clinical and exposure   history, re-testing using an alternate molecular assay should be   considered.       This test is only for use under the Food and Drug   Administration s Emergency Use Authorization (EUA).   Commercial kits are provided by [employer].   Performance characteristics of the EUA have been independently  verified by [facility]                           ment of  Pathology and Laboratory Medicine.   _________________________________________________________________  The ID NOW COVID-19 Letter of Authorization, along with the   authorized Fact Sheet for Healthcare Providers, the authorized Fact  Sheet for Patients, and authorized labeling are available on the FDA   website:  www.fda.gov/MedicalDevices/Safety/EmergencySituations/ssj877176.htm      RBC, UA 02/24/2021 0  0 - 4 /hpf Final    WBC, UA  02/24/2021 1  0 - 5 /hpf Final    Bacteria 02/24/2021 Negative  None-Occ /hpf Final    Yeast, UA 02/24/2021 None  None Final    Squam Epithel, UA 02/24/2021 0  /hpf Final    Hyaline Casts, UA 02/24/2021 1  0-1/lpf /lpf Final    Microscopic Comment 02/24/2021 SEE COMMENT   Final    Comment: Other formed elements not mentioned in the report are not   present in the microscopic examination.       Lactate (Lactic Acid) 02/24/2021 2.0* 0.5 - 1.9 mmol/L Final    Comment: Falsely low lactic acid results can be found in samples   containing >=13.0 mg/dL total bilirubin and/or >=3.5 mg/dL   direct bilirubin.  Lactic Acid  critical result(s) repeated. Called and verbal readback   obtained from Darline Hutchins RN ER.  by CW1 02/24/2021 16:37         ASSESSMENT and PLAN:    1. Type 2 diabetes mellitus without complication, with long-term current use of insulin     2. Essential hypertension     3. Hyperlipidemia, unspecified hyperlipidemia type       T2DM with hyperglycemia-A1c today. Continue current regimen.          Discussed DM, progression of disease, long term complications, tx options.   Discussed A1c and BG goals.   Reviewed  hypoglycemia, s/s and appropriate tx.   Instructed to monitor BG and bring meter/ log to every clinic visit.   - takes ASA, ACEi, statin    HTN - controlled, continue meds as previously prescribed and monitor.     HLP -elevated LDL , on statin therapy, LFTs WNL         Fasting labs  F/u in 3 mths-a1c

## 2022-02-08 ENCOUNTER — DOCUMENTATION ONLY (OUTPATIENT)
Dept: ENDOCRINOLOGY | Facility: CLINIC | Age: 52
End: 2022-02-08
Payer: COMMERCIAL

## 2022-02-08 ENCOUNTER — PATIENT MESSAGE (OUTPATIENT)
Dept: ENDOCRINOLOGY | Facility: CLINIC | Age: 52
End: 2022-02-08
Payer: COMMERCIAL

## 2022-02-08 NOTE — PROGRESS NOTES
[facility]  Dr. Mccormick at [facility]  2/4/22  Cbc wnl  Cmp wnl  Chol 152  Trig 178  HDL 50  LDL 72  A1c 7.9%  Vd 20.6  VB12 449  Ferritin 72

## 2022-07-01 ENCOUNTER — OFFICE VISIT (OUTPATIENT)
Dept: URGENT CARE | Facility: CLINIC | Age: 52
End: 2022-07-01
Payer: COMMERCIAL

## 2022-07-01 VITALS
SYSTOLIC BLOOD PRESSURE: 128 MMHG | RESPIRATION RATE: 19 BRPM | OXYGEN SATURATION: 98 % | TEMPERATURE: 98 F | HEIGHT: 69 IN | WEIGHT: 205 LBS | DIASTOLIC BLOOD PRESSURE: 94 MMHG | HEART RATE: 76 BPM | BODY MASS INDEX: 30.36 KG/M2

## 2022-07-01 DIAGNOSIS — Z11.52 ENCOUNTER FOR SCREENING FOR COVID-19: Primary | ICD-10-CM

## 2022-07-01 DIAGNOSIS — Z20.822 LAB TEST NEGATIVE FOR COVID-19 VIRUS: ICD-10-CM

## 2022-07-01 LAB
CTP QC/QA: YES
SARS-COV-2 AG RESP QL IA.RAPID: NEGATIVE

## 2022-07-01 PROCEDURE — 87811 SARS CORONAVIRUS 2 ANTIGEN POCT, MANUAL READ: ICD-10-PCS | Mod: QW,S$GLB,, | Performed by: STUDENT IN AN ORGANIZED HEALTH CARE EDUCATION/TRAINING PROGRAM

## 2022-07-01 PROCEDURE — 99203 OFFICE O/P NEW LOW 30 MIN: CPT | Mod: S$GLB,,, | Performed by: STUDENT IN AN ORGANIZED HEALTH CARE EDUCATION/TRAINING PROGRAM

## 2022-07-01 PROCEDURE — 99203 PR OFFICE/OUTPT VISIT, NEW, LEVL III, 30-44 MIN: ICD-10-PCS | Mod: S$GLB,,, | Performed by: STUDENT IN AN ORGANIZED HEALTH CARE EDUCATION/TRAINING PROGRAM

## 2022-07-01 PROCEDURE — 87811 SARS-COV-2 COVID19 W/OPTIC: CPT | Mod: QW,S$GLB,, | Performed by: STUDENT IN AN ORGANIZED HEALTH CARE EDUCATION/TRAINING PROGRAM

## 2022-07-01 NOTE — PROGRESS NOTES
"Subjective:       Patient ID: Jules Floyd Jr. is a 51 y.o. male.    Vitals:  height is 5' 9" (1.753 m) and weight is 93 kg (205 lb). His oral temperature is 97.8 °F (36.6 °C). His blood pressure is 128/94 (abnormal) and his pulse is 76. His respiration is 19 and oxygen saturation is 98%.     Chief Complaint: Encounter for covid screening     Patient is a 51-year-old male with a past medical history of hypertension, type 2 diabetes, GERD, and anxiety who presents to clinic for COVID testing.  Patient reports he is vaccinated.  Patient denies any recent or known sick exposures.  Patient denies any acute symptoms.  Patient reports needs a negative COVID test for travel.      Constitution: Negative. Negative for chills, sweating, fatigue and fever.   HENT: Negative.  Negative for ear pain, congestion and sore throat.    Neck: neck negative. Negative for painful lymph nodes.   Cardiovascular: Negative.  Negative for chest pain and palpitations.   Eyes: Negative.    Respiratory: Negative.  Negative for chest tightness, cough and shortness of breath.    Gastrointestinal: Negative.  Negative for abdominal pain, nausea, vomiting and diarrhea.   Endocrine: negative.   Genitourinary: Negative.    Musculoskeletal: Negative.  Negative for muscle ache.   Skin: Negative.  Negative for color change, pale, rash and erythema.   Allergic/Immunologic: Negative.    Neurological: Negative.  Negative for dizziness, light-headedness, passing out, headaches, disorientation and altered mental status.   Hematologic/Lymphatic: Negative.  Negative for swollen lymph nodes.   Psychiatric/Behavioral: Negative.  Negative for altered mental status, disorientation and confusion.       Objective:      Physical Exam   Constitutional: He is oriented to person, place, and time. He appears well-developed. He is cooperative.  Non-toxic appearance. He does not appear ill. No distress.   HENT:   Head: Normocephalic and atraumatic.   Ears:   Right Ear: " Hearing, tympanic membrane, external ear and ear canal normal.   Left Ear: Hearing, tympanic membrane, external ear and ear canal normal.   Nose: Nose normal. No mucosal edema, rhinorrhea, nasal deformity or congestion. No epistaxis. Right sinus exhibits no maxillary sinus tenderness and no frontal sinus tenderness. Left sinus exhibits no maxillary sinus tenderness and no frontal sinus tenderness.   Mouth/Throat: Uvula is midline, oropharynx is clear and moist and mucous membranes are normal. Mucous membranes are moist. No trismus in the jaw. Normal dentition. No uvula swelling. No oropharyngeal exudate or posterior oropharyngeal erythema. Oropharynx is clear.   Eyes: Conjunctivae and lids are normal. Pupils are equal, round, and reactive to light. Right eye exhibits no discharge. Left eye exhibits no discharge. No scleral icterus.   Neck: Trachea normal and phonation normal. Neck supple.   Cardiovascular: Normal rate, regular rhythm, normal heart sounds and normal pulses.   Pulmonary/Chest: Effort normal and breath sounds normal. No respiratory distress. He has no wheezes. He has no rhonchi. He has no rales.   Abdominal: Normal appearance and bowel sounds are normal. He exhibits no distension. Soft. There is no abdominal tenderness.   Musculoskeletal: Normal range of motion.         General: No deformity. Normal range of motion.   Neurological: He is alert and oriented to person, place, and time. He exhibits normal muscle tone. Coordination normal.   Skin: Skin is warm, dry, intact, not diaphoretic, not pale and no rash. Capillary refill takes less than 2 seconds. No erythema   Psychiatric: His speech is normal and behavior is normal. Judgment and thought content normal.   Nursing note and vitals reviewed.        Assessment:       1. Encounter for screening for COVID-19    2. Lab test negative for COVID-19 virus          Plan:         Encounter for screening for COVID-19  -     SARS Coronavirus 2 Antigen, POCT  Manual Read    Lab test negative for COVID-19 virus                 Labs:  COVID negative.  Continue previously prescribed medications as directed.  Follow-up with PCP as needed.  Return to clinic as needed.  To ED for any new acutely worsening symptoms.  Patient in agreement with plan of care.    DISCLAIMER: Please note that my documentation in this Electronic Healthcare Record was produced using speech recognition software and therefore may contain errors related to that software system.These could include grammar, punctuation and spelling errors or the inclusion/exclusion of phrases that were not intended. Garbled syntax, mangled pronouns, and other bizarre constructions may be attributed to that software system.

## 2022-08-24 ENCOUNTER — HOSPITAL ENCOUNTER (OUTPATIENT)
Facility: HOSPITAL | Age: 52
Discharge: HOME | End: 2022-08-26
Attending: EMERGENCY MEDICINE | Admitting: HOSPITALIST
Payer: COMMERCIAL

## 2022-08-24 DIAGNOSIS — K62.5 RECTAL BLEEDING: Primary | ICD-10-CM

## 2022-08-24 DIAGNOSIS — K57.90 DIVERTICULOSIS: ICD-10-CM

## 2022-08-24 PROCEDURE — 80053 COMPREHEN METABOLIC PANEL: CPT | Performed by: EMERGENCY MEDICINE

## 2022-08-24 PROCEDURE — 86803 HEPATITIS C AB TEST: CPT | Performed by: EMERGENCY MEDICINE

## 2022-08-24 PROCEDURE — 36415 COLL VENOUS BLD VENIPUNCTURE: CPT | Performed by: EMERGENCY MEDICINE

## 2022-08-24 PROCEDURE — 86901 BLOOD TYPING SEROLOGIC RH(D): CPT | Performed by: EMERGENCY MEDICINE

## 2022-08-24 PROCEDURE — U0002 COVID-19 LAB TEST NON-CDC: HCPCS | Performed by: EMERGENCY MEDICINE

## 2022-08-24 PROCEDURE — 86900 BLOOD TYPING SEROLOGIC ABO: CPT | Performed by: EMERGENCY MEDICINE

## 2022-08-24 PROCEDURE — 86850 RBC ANTIBODY SCREEN: CPT | Performed by: EMERGENCY MEDICINE

## 2022-08-24 PROCEDURE — 99285 EMERGENCY DEPT VISIT HI MDM: CPT | Mod: 25

## 2022-08-24 PROCEDURE — 87389 HIV-1 AG W/HIV-1&-2 AB AG IA: CPT | Performed by: EMERGENCY MEDICINE

## 2022-08-24 PROCEDURE — 85025 COMPLETE CBC W/AUTO DIFF WBC: CPT | Performed by: EMERGENCY MEDICINE

## 2022-08-24 NOTE — LETTER
August 26, 2022         [address] [place] [address]  Phone: [phone]         Patient: Jules Floyd   YOB: 1970  Date of Visit:08/24/2022- 08/26/2022    To Whom It May Concern:    Jules Floyd  was at [facility] from 08/24/2022- 08/26/2022. The patient may return to work on 08/29/2022 with no restrictions. If you have any questions or concerns, or if I can be of further assistance, please do not hesitate to contact me.    Sincerely,    Lauren Arisa NP

## 2022-08-24 NOTE — LETTER
August 26, 2022         [address] [place] [address]  [facility] [address]  Phone: [phone]       Patient: Jules Floyd   YOB: 1970  Date of Visit:08/24/2022- 08/26/2022    To Whom It May Concern:    Jules Floyd  was at [facility] from 08/26/2022- 08/26/2022. The patient may return to work on 08/29/2022 with no restrictions. If you have any questions or concerns, or if I can be of further assistance, please do not hesitate to contact me.    Sincerely,    Lauren Arias NP

## 2022-08-25 ENCOUNTER — ANESTHESIA EVENT (OUTPATIENT)
Dept: ENDOSCOPY | Facility: HOSPITAL | Age: 52
End: 2022-08-25
Payer: COMMERCIAL

## 2022-08-25 ENCOUNTER — ANESTHESIA (OUTPATIENT)
Dept: ENDOSCOPY | Facility: HOSPITAL | Age: 52
End: 2022-08-25
Payer: COMMERCIAL

## 2022-08-25 PROBLEM — Z98.84 HX OF GASTRIC BYPASS: Status: ACTIVE | Noted: 2022-08-25

## 2022-08-25 PROBLEM — K62.5 RECTAL BLEEDING: Status: ACTIVE | Noted: 2022-08-25

## 2022-08-25 PROBLEM — K21.9 GERD (GASTROESOPHAGEAL REFLUX DISEASE): Status: ACTIVE | Noted: 2022-08-25

## 2022-08-25 LAB
ABO GROUP BLD: NORMAL
ALBUMIN SERPL BCP-MCNC: 3.2 G/DL (ref 3.5–5.2)
ALBUMIN SERPL BCP-MCNC: 3.6 G/DL (ref 3.5–5.2)
ALP SERPL-CCNC: 68 U/L (ref 55–135)
ALP SERPL-CCNC: 76 U/L (ref 55–135)
ALT SERPL W/O P-5'-P-CCNC: 21 U/L (ref 10–44)
ALT SERPL W/O P-5'-P-CCNC: 23 U/L (ref 10–44)
ANION GAP SERPL CALC-SCNC: 11 MMOL/L (ref 8–16)
ANION GAP SERPL CALC-SCNC: 9 MMOL/L (ref 8–16)
AST SERPL-CCNC: 12 U/L (ref 10–40)
AST SERPL-CCNC: 13 U/L (ref 10–40)
BASOPHILS # BLD AUTO: 0.03 K/UL (ref 0–0.2)
BASOPHILS # BLD AUTO: 0.03 K/UL (ref 0–0.2)
BASOPHILS # BLD AUTO: 0.04 K/UL (ref 0–0.2)
BASOPHILS # BLD AUTO: 0.04 K/UL (ref 0–0.2)
BASOPHILS NFR BLD: 0.3 % (ref 0–1.9)
BASOPHILS NFR BLD: 0.3 % (ref 0–1.9)
BASOPHILS NFR BLD: 0.4 % (ref 0–1.9)
BASOPHILS NFR BLD: 0.5 % (ref 0–1.9)
BILIRUB SERPL-MCNC: 0.3 MG/DL (ref 0.1–1)
BILIRUB SERPL-MCNC: 0.4 MG/DL (ref 0.1–1)
BLD GP AB SCN CELLS X3 SERPL QL: NORMAL
BUN SERPL-MCNC: 14 MG/DL (ref 6–20)
BUN SERPL-MCNC: 14 MG/DL (ref 6–20)
CALCIUM SERPL-MCNC: 8.8 MG/DL (ref 8.7–10.5)
CALCIUM SERPL-MCNC: 9.8 MG/DL (ref 8.7–10.5)
CHLORIDE SERPL-SCNC: 106 MMOL/L (ref 95–110)
CHLORIDE SERPL-SCNC: 109 MMOL/L (ref 95–110)
CO2 SERPL-SCNC: 24 MMOL/L (ref 23–29)
CO2 SERPL-SCNC: 25 MMOL/L (ref 23–29)
CREAT SERPL-MCNC: 0.7 MG/DL (ref 0.5–1.4)
CREAT SERPL-MCNC: 0.8 MG/DL (ref 0.5–1.4)
DIFFERENTIAL METHOD: ABNORMAL
EOSINOPHIL # BLD AUTO: 0.4 K/UL (ref 0–0.5)
EOSINOPHIL NFR BLD: 3.8 % (ref 0–8)
EOSINOPHIL NFR BLD: 4.1 % (ref 0–8)
EOSINOPHIL NFR BLD: 4.2 % (ref 0–8)
EOSINOPHIL NFR BLD: 4.6 % (ref 0–8)
ERYTHROCYTE [DISTWIDTH] IN BLOOD BY AUTOMATED COUNT: 13.6 % (ref 11.5–14.5)
ERYTHROCYTE [DISTWIDTH] IN BLOOD BY AUTOMATED COUNT: 13.7 % (ref 11.5–14.5)
ERYTHROCYTE [DISTWIDTH] IN BLOOD BY AUTOMATED COUNT: 13.7 % (ref 11.5–14.5)
ERYTHROCYTE [DISTWIDTH] IN BLOOD BY AUTOMATED COUNT: 13.8 % (ref 11.5–14.5)
EST. GFR  (NO RACE VARIABLE): >60 ML/MIN/1.73 M^2
EST. GFR  (NO RACE VARIABLE): >60 ML/MIN/1.73 M^2
GLUCOSE SERPL-MCNC: 120 MG/DL (ref 70–110)
GLUCOSE SERPL-MCNC: 88 MG/DL (ref 70–110)
HCT VFR BLD AUTO: 30.9 % (ref 40–54)
HCT VFR BLD AUTO: 33.3 % (ref 40–54)
HCT VFR BLD AUTO: 34.3 % (ref 40–54)
HCT VFR BLD AUTO: 38.7 % (ref 40–54)
HCV AB SERPL QL IA: NEGATIVE
HGB BLD-MCNC: 10.1 G/DL (ref 14–18)
HGB BLD-MCNC: 11.1 G/DL (ref 14–18)
HGB BLD-MCNC: 11.3 G/DL (ref 14–18)
HGB BLD-MCNC: 12.9 G/DL (ref 14–18)
HIV 1+2 AB+HIV1 P24 AG SERPL QL IA: NEGATIVE
IMM GRANULOCYTES # BLD AUTO: 0.03 K/UL (ref 0–0.04)
IMM GRANULOCYTES # BLD AUTO: 0.04 K/UL (ref 0–0.04)
IMM GRANULOCYTES # BLD AUTO: 0.05 K/UL (ref 0–0.04)
IMM GRANULOCYTES # BLD AUTO: 0.06 K/UL (ref 0–0.04)
IMM GRANULOCYTES NFR BLD AUTO: 0.4 % (ref 0–0.5)
IMM GRANULOCYTES NFR BLD AUTO: 0.5 % (ref 0–0.5)
IMM GRANULOCYTES NFR BLD AUTO: 0.6 % (ref 0–0.5)
IMM GRANULOCYTES NFR BLD AUTO: 0.6 % (ref 0–0.5)
LYMPHOCYTES # BLD AUTO: 1.1 K/UL (ref 1–4.8)
LYMPHOCYTES # BLD AUTO: 1.2 K/UL (ref 1–4.8)
LYMPHOCYTES # BLD AUTO: 1.3 K/UL (ref 1–4.8)
LYMPHOCYTES # BLD AUTO: 1.5 K/UL (ref 1–4.8)
LYMPHOCYTES NFR BLD: 11.4 % (ref 18–48)
LYMPHOCYTES NFR BLD: 13.4 % (ref 18–48)
LYMPHOCYTES NFR BLD: 15 % (ref 18–48)
LYMPHOCYTES NFR BLD: 19.1 % (ref 18–48)
MAGNESIUM SERPL-MCNC: 1.6 MG/DL (ref 1.6–2.6)
MCH RBC QN AUTO: 29.4 PG (ref 27–31)
MCH RBC QN AUTO: 29.5 PG (ref 27–31)
MCHC RBC AUTO-ENTMCNC: 32.7 G/DL (ref 32–36)
MCHC RBC AUTO-ENTMCNC: 32.9 G/DL (ref 32–36)
MCHC RBC AUTO-ENTMCNC: 33.3 G/DL (ref 32–36)
MCHC RBC AUTO-ENTMCNC: 33.3 G/DL (ref 32–36)
MCV RBC AUTO: 88 FL (ref 82–98)
MCV RBC AUTO: 88 FL (ref 82–98)
MCV RBC AUTO: 89 FL (ref 82–98)
MCV RBC AUTO: 90 FL (ref 82–98)
MONOCYTES # BLD AUTO: 0.6 K/UL (ref 0.3–1)
MONOCYTES # BLD AUTO: 0.8 K/UL (ref 0.3–1)
MONOCYTES # BLD AUTO: 0.8 K/UL (ref 0.3–1)
MONOCYTES # BLD AUTO: 0.9 K/UL (ref 0.3–1)
MONOCYTES NFR BLD: 10.2 % (ref 4–15)
MONOCYTES NFR BLD: 7 % (ref 4–15)
MONOCYTES NFR BLD: 8.6 % (ref 4–15)
MONOCYTES NFR BLD: 9.3 % (ref 4–15)
NEUTROPHILS # BLD AUTO: 5.1 K/UL (ref 1.8–7.7)
NEUTROPHILS # BLD AUTO: 6.1 K/UL (ref 1.8–7.7)
NEUTROPHILS # BLD AUTO: 6.6 K/UL (ref 1.8–7.7)
NEUTROPHILS # BLD AUTO: 7.6 K/UL (ref 1.8–7.7)
NEUTROPHILS NFR BLD: 65.2 % (ref 38–73)
NEUTROPHILS NFR BLD: 70.7 % (ref 38–73)
NEUTROPHILS NFR BLD: 74.6 % (ref 38–73)
NEUTROPHILS NFR BLD: 75.2 % (ref 38–73)
NRBC BLD-RTO: 0 /100 WBC
PLATELET # BLD AUTO: 201 K/UL (ref 150–450)
PLATELET # BLD AUTO: 234 K/UL (ref 150–450)
PLATELET # BLD AUTO: 251 K/UL (ref 150–450)
PLATELET # BLD AUTO: 306 K/UL (ref 150–450)
PLATELET BLD QL SMEAR: ABNORMAL
PMV BLD AUTO: 10 FL (ref 9.2–12.9)
PMV BLD AUTO: 10.2 FL (ref 9.2–12.9)
PMV BLD AUTO: 9.7 FL (ref 9.2–12.9)
PMV BLD AUTO: 9.9 FL (ref 9.2–12.9)
POCT GLUCOSE: 103 MG/DL (ref 70–110)
POCT GLUCOSE: 217 MG/DL (ref 70–110)
POCT GLUCOSE: 243 MG/DL (ref 70–110)
POTASSIUM SERPL-SCNC: 4 MMOL/L (ref 3.5–5.1)
POTASSIUM SERPL-SCNC: 4.1 MMOL/L (ref 3.5–5.1)
PROT SERPL-MCNC: 5.7 G/DL (ref 6–8.4)
PROT SERPL-MCNC: 6.6 G/DL (ref 6–8.4)
RBC # BLD AUTO: 3.43 M/UL (ref 4.6–6.2)
RBC # BLD AUTO: 3.77 M/UL (ref 4.6–6.2)
RBC # BLD AUTO: 3.84 M/UL (ref 4.6–6.2)
RBC # BLD AUTO: 4.38 M/UL (ref 4.6–6.2)
RH BLD: NORMAL
SARS-COV-2 RDRP RESP QL NAA+PROBE: NEGATIVE
SODIUM SERPL-SCNC: 142 MMOL/L (ref 136–145)
SODIUM SERPL-SCNC: 142 MMOL/L (ref 136–145)
WBC # BLD AUTO: 10.04 K/UL (ref 3.9–12.7)
WBC # BLD AUTO: 7.76 K/UL (ref 3.9–12.7)
WBC # BLD AUTO: 8.59 K/UL (ref 3.9–12.7)
WBC # BLD AUTO: 8.82 K/UL (ref 3.9–12.7)

## 2022-08-25 PROCEDURE — 99205 PR OFFICE/OUTPT VISIT, NEW, LEVL V, 60-74 MIN: ICD-10-PCS | Mod: 25,,, | Performed by: INTERNAL MEDICINE

## 2022-08-25 PROCEDURE — 96374 THER/PROPH/DIAG INJ IV PUSH: CPT

## 2022-08-25 PROCEDURE — C9113 INJ PANTOPRAZOLE SODIUM, VIA: HCPCS

## 2022-08-25 PROCEDURE — 25000003 PHARM REV CODE 250

## 2022-08-25 PROCEDURE — G0378 HOSPITAL OBSERVATION PER HR: HCPCS

## 2022-08-25 PROCEDURE — 25000003 PHARM REV CODE 250: Performed by: EMERGENCY MEDICINE

## 2022-08-25 PROCEDURE — 88305 TISSUE EXAM BY PATHOLOGIST: CPT | Performed by: PATHOLOGY

## 2022-08-25 PROCEDURE — 63600175 PHARM REV CODE 636 W HCPCS

## 2022-08-25 PROCEDURE — 80053 COMPREHEN METABOLIC PANEL: CPT

## 2022-08-25 PROCEDURE — 96361 HYDRATE IV INFUSION ADD-ON: CPT

## 2022-08-25 PROCEDURE — D9220A PRA ANESTHESIA: Mod: CRNA,,, | Performed by: NURSE ANESTHETIST, CERTIFIED REGISTERED

## 2022-08-25 PROCEDURE — 45378 DIAGNOSTIC COLONOSCOPY: CPT | Performed by: INTERNAL MEDICINE

## 2022-08-25 PROCEDURE — 37000009 HC ANESTHESIA EA ADD 15 MINS: Performed by: INTERNAL MEDICINE

## 2022-08-25 PROCEDURE — 88305 TISSUE EXAM BY PATHOLOGIST: CPT | Mod: 26,,, | Performed by: PATHOLOGY

## 2022-08-25 PROCEDURE — 43239 EGD BIOPSY SINGLE/MULTIPLE: CPT | Performed by: INTERNAL MEDICINE

## 2022-08-25 PROCEDURE — 25500020 PHARM REV CODE 255

## 2022-08-25 PROCEDURE — 85025 COMPLETE CBC W/AUTO DIFF WBC: CPT | Mod: 91

## 2022-08-25 PROCEDURE — 88305 TISSUE EXAM BY PATHOLOGIST: ICD-10-PCS | Mod: 26,,, | Performed by: PATHOLOGY

## 2022-08-25 PROCEDURE — 83735 ASSAY OF MAGNESIUM: CPT

## 2022-08-25 PROCEDURE — 45378 PR COLONOSCOPY,DIAGNOSTIC: ICD-10-PCS | Mod: ,,, | Performed by: INTERNAL MEDICINE

## 2022-08-25 PROCEDURE — 45378 DIAGNOSTIC COLONOSCOPY: CPT | Mod: ,,, | Performed by: INTERNAL MEDICINE

## 2022-08-25 PROCEDURE — 36415 COLL VENOUS BLD VENIPUNCTURE: CPT

## 2022-08-25 PROCEDURE — 43239 PR EGD, FLEX, W/BIOPSY, SGL/MULTI: ICD-10-PCS | Mod: 51,,, | Performed by: INTERNAL MEDICINE

## 2022-08-25 PROCEDURE — D9220A PRA ANESTHESIA: ICD-10-PCS | Mod: CRNA,,, | Performed by: NURSE ANESTHETIST, CERTIFIED REGISTERED

## 2022-08-25 PROCEDURE — 27201012 HC FORCEPS, HOT/COLD, DISP: Performed by: INTERNAL MEDICINE

## 2022-08-25 PROCEDURE — 25000003 PHARM REV CODE 250: Performed by: INTERNAL MEDICINE

## 2022-08-25 PROCEDURE — 43239 EGD BIOPSY SINGLE/MULTIPLE: CPT | Mod: 51,,, | Performed by: INTERNAL MEDICINE

## 2022-08-25 PROCEDURE — 25000003 PHARM REV CODE 250: Performed by: NURSE ANESTHETIST, CERTIFIED REGISTERED

## 2022-08-25 PROCEDURE — D9220A PRA ANESTHESIA: ICD-10-PCS | Mod: ANES,,, | Performed by: ANESTHESIOLOGY

## 2022-08-25 PROCEDURE — 96361 HYDRATE IV INFUSION ADD-ON: CPT | Mod: 59

## 2022-08-25 PROCEDURE — 94761 N-INVAS EAR/PLS OXIMETRY MLT: CPT

## 2022-08-25 PROCEDURE — 63600175 PHARM REV CODE 636 W HCPCS: Performed by: NURSE ANESTHETIST, CERTIFIED REGISTERED

## 2022-08-25 PROCEDURE — 96376 TX/PRO/DX INJ SAME DRUG ADON: CPT | Mod: 59

## 2022-08-25 PROCEDURE — D9220A PRA ANESTHESIA: Mod: ANES,,, | Performed by: ANESTHESIOLOGY

## 2022-08-25 PROCEDURE — 37000008 HC ANESTHESIA 1ST 15 MINUTES: Performed by: INTERNAL MEDICINE

## 2022-08-25 PROCEDURE — 99205 OFFICE O/P NEW HI 60 MIN: CPT | Mod: 25,,, | Performed by: INTERNAL MEDICINE

## 2022-08-25 RX ORDER — GLUCAGON 1 MG
1 KIT INJECTION
Status: DISCONTINUED | OUTPATIENT
Start: 2022-08-25 | End: 2022-08-26 | Stop reason: HOSPADM

## 2022-08-25 RX ORDER — PANTOPRAZOLE SODIUM 40 MG/10ML
40 INJECTION, POWDER, LYOPHILIZED, FOR SOLUTION INTRAVENOUS 2 TIMES DAILY
Status: DISCONTINUED | OUTPATIENT
Start: 2022-08-25 | End: 2022-08-26 | Stop reason: HOSPADM

## 2022-08-25 RX ORDER — DEXTROMETHORPHAN/PSEUDOEPHED 2.5-7.5/.8
DROPS ORAL
Status: COMPLETED | OUTPATIENT
Start: 2022-08-25 | End: 2022-08-25

## 2022-08-25 RX ORDER — PROPOFOL 10 MG/ML
VIAL (ML) INTRAVENOUS
Status: DISCONTINUED | OUTPATIENT
Start: 2022-08-25 | End: 2022-08-25

## 2022-08-25 RX ORDER — INSULIN ASPART 100 [IU]/ML
1-10 INJECTION, SOLUTION INTRAVENOUS; SUBCUTANEOUS EVERY 6 HOURS PRN
Status: DISCONTINUED | OUTPATIENT
Start: 2022-08-25 | End: 2022-08-26 | Stop reason: HOSPADM

## 2022-08-25 RX ORDER — MECLIZINE HYDROCHLORIDE 50 MG/1
25 TABLET ORAL
COMMUNITY
End: 2023-10-23

## 2022-08-25 RX ORDER — POLYETHYLENE GLYCOL 3350, SODIUM SULFATE ANHYDROUS, SODIUM BICARBONATE, SODIUM CHLORIDE, POTASSIUM CHLORIDE 236; 22.74; 6.74; 5.86; 2.97 G/4L; G/4L; G/4L; G/4L; G/4L
4000 POWDER, FOR SOLUTION ORAL ONCE
Status: COMPLETED | OUTPATIENT
Start: 2022-08-25 | End: 2022-08-25

## 2022-08-25 RX ORDER — BUPROPION HYDROCHLORIDE 150 MG/1
150 TABLET ORAL DAILY
Status: DISCONTINUED | OUTPATIENT
Start: 2022-08-25 | End: 2022-08-26 | Stop reason: HOSPADM

## 2022-08-25 RX ORDER — SODIUM CHLORIDE 9 MG/ML
INJECTION, SOLUTION INTRAVENOUS CONTINUOUS
Status: DISCONTINUED | OUTPATIENT
Start: 2022-08-25 | End: 2022-08-26 | Stop reason: HOSPADM

## 2022-08-25 RX ORDER — IBUPROFEN 200 MG
1 CAPSULE ORAL NIGHTLY
COMMUNITY

## 2022-08-25 RX ORDER — METOPROLOL SUCCINATE 50 MG/1
100 TABLET, EXTENDED RELEASE ORAL DAILY
Status: DISCONTINUED | OUTPATIENT
Start: 2022-08-25 | End: 2022-08-26

## 2022-08-25 RX ORDER — CHOLECALCIFEROL (VITAMIN D3) 25 MCG
1000 TABLET ORAL 2 TIMES DAILY
COMMUNITY

## 2022-08-25 RX ORDER — LIDOCAINE HCL/PF 100 MG/5ML
SYRINGE (ML) INTRAVENOUS
Status: DISCONTINUED | OUTPATIENT
Start: 2022-08-25 | End: 2022-08-25

## 2022-08-25 RX ADMIN — PROPOFOL 40 MG: 10 INJECTION, EMULSION INTRAVENOUS at 01:08

## 2022-08-25 RX ADMIN — SODIUM CHLORIDE: 0.9 INJECTION, SOLUTION INTRAVENOUS at 02:08

## 2022-08-25 RX ADMIN — SODIUM CHLORIDE 1000 ML: 0.9 INJECTION, SOLUTION INTRAVENOUS at 12:08

## 2022-08-25 RX ADMIN — SODIUM CHLORIDE: 0.9 INJECTION, SOLUTION INTRAVENOUS at 08:08

## 2022-08-25 RX ADMIN — PANTOPRAZOLE SODIUM 40 MG: 40 INJECTION, POWDER, LYOPHILIZED, FOR SOLUTION INTRAVENOUS at 09:08

## 2022-08-25 RX ADMIN — SIMETHICONE 20 MG: 20 SUSPENSION/ DROPS ORAL at 01:08

## 2022-08-25 RX ADMIN — PANTOPRAZOLE SODIUM 40 MG: 40 INJECTION, POWDER, LYOPHILIZED, FOR SOLUTION INTRAVENOUS at 08:08

## 2022-08-25 RX ADMIN — PROPOFOL 40 MG: 10 INJECTION, EMULSION INTRAVENOUS at 02:08

## 2022-08-25 RX ADMIN — PROPOFOL 80 MG: 10 INJECTION, EMULSION INTRAVENOUS at 01:08

## 2022-08-25 RX ADMIN — PANTOPRAZOLE SODIUM 40 MG: 40 INJECTION, POWDER, LYOPHILIZED, FOR SOLUTION INTRAVENOUS at 02:08

## 2022-08-25 RX ADMIN — IOHEXOL 100 ML: 350 INJECTION, SOLUTION INTRAVENOUS at 06:08

## 2022-08-25 RX ADMIN — LIDOCAINE HYDROCHLORIDE 50 MG: 20 INJECTION INTRAVENOUS at 01:08

## 2022-08-25 RX ADMIN — POLYETHYLENE GLYCOL-3350 AND ELECTROLYTES 4000 ML: 236; 6.74; 5.86; 2.97; 22.74 POWDER, FOR SOLUTION ORAL at 03:08

## 2022-08-25 NOTE — HPI
"Jules Floyd Jr. is a 51 y.o. y.o. male with a PMHx of HTN, HLD, T2DM, GERD, and s/p gastric sleeve (4/2022) who presented to the ED with blood in stools onset x 0400 8/24. Patient reports 8 episodes of bloody bowel movements with bright red bleeding and clots. He states he had been taking alfonso back and body for the past 4 days and Toradol for past 5 days. He also states he is taking ASA daily. He reports hx of GI bleeding the past but "never to this extent." He reports colonoscopy 3 yrs ago which was normal to his knowledge. He also reports chronic back pain and diarrhea. He denies fever, chills, CP, palpitations, SOB, cough, abdominal pain, n/v, hematuria, dysuria, light headedness, and HA. ED workup was significant for GI bleed and anemia. Hospital Medicine was consulted for further workup and management of the patient.       "

## 2022-08-25 NOTE — PROVATION PATIENT INSTRUCTIONS
Discharge Summary/Instructions after an Endoscopic Procedure  Patient Name: Jules Floyd  Patient MRN: [id number]  Patient YOB: 1970  Thursday, August 25, 2022  Bin Shah MD  Dear patient,  As a result of recent federal legislation (The Federal Cures Act), you may   receive lab or pathology results from your procedure in your [facility]   account before your physician is able to contact you. Your physician or   their representative will relay the results to you with their   recommendations at their soonest availability.  Thank you,  RESTRICTIONS:  During your procedure today, you received medications for sedation.  These   medications may affect your judgment, balance and coordination.  Therefore,   for 24 hours, you have the following restrictions:   - DO NOT drive a car, operate machinery, make legal/financial decisions,   sign important papers or drink alcohol.    ACTIVITY:  Today: no heavy lifting, straining or running due to procedural   sedation/anesthesia.  The following day: return to full activity including work.  DIET:  Eat and drink normally unless instructed otherwise.     TREATMENT FOR COMMON SIDE EFFECTS:  - Mild abdominal pain, nausea, belching, bloating or excessive gas:  rest,   eat lightly and use a heating pad.  - Sore Throat: treat with throat lozenges and/or gargle with warm salt   water.  - Because air was used during the procedure, expelling large amounts of air   from your rectum or belching is normal.  - If a bowel prep was taken, you may not have a bowel movement for 1-3 days.    This is normal.  SYMPTOMS TO WATCH FOR AND REPORT TO YOUR PHYSICIAN:  1. Abdominal pain or bloating, other than gas cramps.  2. Chest pain.  3. Back pain.  4. Signs of infection such as: chills or fever occurring within 24 hours   after the procedure.  5. Rectal bleeding, which would show as bright red, maroon, or black stools.   (A tablespoon of blood from the rectum is not serious, especially if    hemorrhoids are present.)  6. Vomiting.  7. Weakness or dizziness.  GO DIRECTLY TO THE NEAREST EMERGENCY ROOM IF YOU HAVE ANY OF THE FOLLOWING:      Difficulty breathing              Chills and/or fever over 101 F   Persistent vomiting and/or vomiting blood   Severe abdominal pain   Severe chest pain   Black, tarry stools   Bleeding- more than one tablespoon   Any other symptom or condition that you feel may need urgent attention  Your doctor recommends these additional instructions:  If any biopsies were taken, your doctors clinic will contact you in 1 to 2   weeks with any results.  - Return patient to [facility] for ongoing care.   - Resume previous diet.   - Continue present medications.   - No aspirin, ibuprofen, naproxen, or other non-steroidal anti-inflammatory   drugs.   - Await pathology results.   - Perform a colonoscopy today.   - Return to GI office after studies are complete.  For questions, problems or results please call your physician - Bin Shah MD at Work:  [phone].  [facility], EMERGENCY ROOM PHONE NUMBER: [phone]  IF A COMPLICATION OR EMERGENCY SITUATION ARISES AND YOU ARE UNABLE TO REACH   YOUR PHYSICIAN - GO DIRECTLY TO THE EMERGENCY ROOM.  Bin Shah MD  8/25/2022 1:39:57 PM  This report has been verified and signed electronically.  Dear patient,  As a result of recent federal legislation (The Federal Cures Act), you may   receive lab or pathology results from your procedure in your [facility]   account before your physician is able to contact you. Your physician or   their representative will relay the results to you with their   recommendations at their soonest availability.  Thank you,  PROVATION

## 2022-08-25 NOTE — ED PROVIDER NOTES
Encounter Date: 8/24/2022       History     Chief Complaint   Patient presents with    Rectal Bleeding     Bright red, started last 0400 today.      51-year-old male history of gastric sleeve, diabetes, GERD, hypertension presents with bloody bowel movements for almost 24 hours.  He has had at least 6 bloody bowel movements with bright red bleeding and clots.  The amount of blood seems to be increasing according to the patient.  No history of this.  Colonoscopy 3 years ago was normal per the patient.  No hematemesis.  Patient does not drink significant quantities of alcohol.  He is not on any blood thinners.  He has some low back pain but no abdominal pain.    The history is provided by the patient.     Review of patient's allergies indicates:   Allergen Reactions    Humalog audie kwikpen u-100  [insulin lispro] Hives, Itching, Rash and Swelling     Past Medical History:   Diagnosis Date    Anxiety     COVID-19     Diabetes mellitus     GERD (gastroesophageal reflux disease)     Hypertension      Past Surgical History:   Procedure Laterality Date    BACK SURGERY      COLOSTOMY  02/2019 2/2019    right knee arthroscope  1986     Family History   Problem Relation Age of Onset    Arthritis Mother     Diabetes Mother     Heart disease Mother     Hyperlipidemia Father     Hypertension Father     Cancer Maternal Aunt      Social History     Tobacco Use    Smoking status: Never Smoker   Substance Use Topics    Alcohol use: Yes     Alcohol/week: 2.0 standard drinks     Types: 2 Cans of beer per week     Comment: occassional    Drug use: No     Review of Systems   Constitutional: Negative for fever.   HENT: Negative for sore throat.    Respiratory: Negative for shortness of breath.    Cardiovascular: Negative for chest pain.   Gastrointestinal: Positive for blood in stool. Negative for nausea.   Genitourinary: Negative for dysuria.   Musculoskeletal: Negative for back pain.   Skin: Negative for rash.    Neurological: Negative for weakness.   All other systems reviewed and are negative.      Physical Exam     Initial Vitals [08/24/22 2318]   BP Pulse Resp Temp SpO2   103/64 78 16 97.8 °F (36.6 °C) 100 %      MAP       --         Physical Exam    Nursing note and vitals reviewed.  Constitutional: He appears well-developed and well-nourished. He is not diaphoretic.  Non-toxic appearance. He does not have a sickly appearance. He does not appear ill. No distress.   HENT:   Head: Normocephalic and atraumatic.   Eyes: EOM are normal.   Neck: Neck supple.   Normal range of motion.  Cardiovascular: Normal rate, regular rhythm and normal heart sounds. Exam reveals no gallop and no friction rub.    No murmur heard.  Pulmonary/Chest: Breath sounds normal. No respiratory distress. He has no wheezes. He has no rhonchi. He has no rales.   Abdominal: Abdomen is soft. He exhibits no distension. There is no abdominal tenderness. There is no rebound and no guarding.   Musculoskeletal:         General: Normal range of motion.      Cervical back: Normal range of motion and neck supple. No rigidity. Normal range of motion.     Neurological: He is alert and oriented to person, place, and time.   Skin: Skin is warm and dry. No rash noted.   Psychiatric: He has a normal mood and affect. His behavior is normal. Judgment and thought content normal.         ED Course   Procedures  Labs Reviewed   CBC W/ AUTO DIFFERENTIAL - Abnormal; Notable for the following components:       Result Value    RBC 4.38 (*)     Hemoglobin 12.9 (*)     Hematocrit 38.7 (*)     Immature Granulocytes 0.6 (*)     Immature Grans (Abs) 0.06 (*)     Gran % 75.2 (*)     Lymph % 11.4 (*)     All other components within normal limits    Narrative:     Release to patient->Immediate   COMPREHENSIVE METABOLIC PANEL - Abnormal; Notable for the following components:    Glucose 120 (*)     All other components within normal limits    Narrative:     Release to  patient->Immediate   SARS-COV-2 RNA AMPLIFICATION, QUAL   HIV 1 / 2 ANTIBODY   HEPATITIS C ANTIBODY   TYPE & SCREEN   GROUP & RH   POCT GLUCOSE MONITORING CONTINUOUS          Imaging Results    None          Medications   sodium chloride 0.9% bolus 1,000 mL (1,000 mLs Intravenous New Bag 8/25/22 0017)   pantoprazole injection 40 mg (has no administration in time range)   polyethylene glycol (GoLYTELY) solution (has no administration in time range)   0.9%  NaCl infusion (has no administration in time range)   dextrose 50% injection 12.5 g (has no administration in time range)   glucagon (human recombinant) injection 1 mg (has no administration in time range)   insulin aspart U-100 pen 1-10 Units (has no administration in time range)     Medical Decision Making:   History:   I obtained history from: someone other than patient.  Old Medical Records: I decided to obtain old medical records.  Clinical Tests:   Lab Tests: Reviewed and Ordered             ED Course as of 08/25/22 0055   Wed Aug 24, 2022   2328 SpO2: 100 % [EF]   2328 Resp: 16 [EF]   2328 Pulse: 78 [EF]   2328 Temp src: Oral [EF]   2328 Temp: 97.8 °F (36.6 °C) [EF]   2328 BP: 103/64 [EF]   Thu Aug 25, 2022   0014 SARS-CoV-2 RNA, Amplification, Qual: Negative [EF]   0016 Hemoglobin(!): 12.9 [EF]   0018 Faiza with HM to admit for LGIB [EF]      ED Course User Index  [EF] Demarco Davidson MD             Clinical Impression:   Final diagnoses:  [K62.5] Rectal bleeding (Primary)          ED Disposition Condition    Observation               51-year-old male presents with bloody stool for about 24 hours.  Patient reports the volume of blood in frequency seems to be worsening.  Hemoglobin is 13 which is below his baseline of 15.  He has had 1 other bloody stool in the emergency room.  Given the duration and the fact this seems to be worsening he will be admitted to Hospital Medicine for likely colonoscopy in the morning.     Demarco Davidson MD  08/25/22 0056

## 2022-08-25 NOTE — TRANSFER OF CARE
"Anesthesia Transfer of Care Note    Patient: Jules Floyd Jr.    Procedure(s) Performed: Procedure(s) (LRB):  COLONOSCOPY (N/A)  EGD (ESOPHAGOGASTRODUODENOSCOPY) (N/A)    Patient location: PACU    Anesthesia Type: general    Transport from OR: Transported from OR on room air with adequate spontaneous ventilation    Post pain: adequate analgesia    Post assessment: no apparent anesthetic complications and tolerated procedure well    Post vital signs: stable    Level of consciousness: awake, alert and oriented    Nausea/Vomiting: no nausea/vomiting    Complications: none    Transfer of care protocol was followed      Last vitals:   Visit Vitals  /77 (BP Location: Left arm, Patient Position: Lying)   Pulse 73   Temp 37.1 °C (98.8 °F) (Skin)   Resp (!) 22   Ht 5' 9" (1.753 m)   Wt 88.9 kg (195 lb 15.8 oz)   SpO2 97%   BMI 28.94 kg/m²     "

## 2022-08-25 NOTE — PLAN OF CARE
[facility]  Initial Discharge Assessment       Primary Care Provider: Ina Ellsworth NP    Admission Diagnosis: Rectal bleeding [K62.5]    Admission Date: 8/24/2022  Expected Discharge Date: 8/26/2022    SW met with pt at bedside to complete discharge assessment, verified PCP, pharmacy and information on facesheet.  No HH, DME, dialysis and doesn't take coumadin.  Friend, Yesenia, will provide transportation home.  No needs.    Discharge Barriers Identified: None    Payor: AETNA / Plan: [employer] CHOICE POS / Product Type: Commercial /     Extended Emergency Contact Information  Primary Emergency Contact: FOX HERNANDEZ  Mobile Phone: [phone]  Relation: Sister  Preferred language: English   needed? No    Discharge Plan A: Home  Discharge Plan B: Home      [facility] [id number] [facility] [address]  [id number] [facility] [address]  [place] [address]  Phone: [phone] Fax: [phone]      Initial Assessment (most recent)     Adult Discharge Assessment - 08/25/22 1349        Discharge Assessment    Assessment Type Discharge Planning Assessment     Confirmed/corrected address, phone number and insurance Yes     Confirmed Demographics Correct on Facesheet     Source of Information patient     Communicated CELESTINA with patient/caregiver No     Do you expect to return to your current living situation? Yes     Prior to hospitilization cognitive status: Alert/Oriented     Current cognitive status: Alert/Oriented     Walking or Climbing Stairs Difficulty none     Dressing/Bathing Difficulty none     Equipment Currently Used at Home glucometer     Readmission within 30 days? No     Patient currently being followed by outpatient case management? No     Do you currently have service(s) that help you manage your care at home? No     Do you take prescription medications? Yes     Do you have prescription coverage? Yes     Do you have any problems affording any of your prescribed medications? No      Is the patient taking medications as prescribed? yes     Who is going to help you get home at discharge? Yesenia, friend     How do you get to doctors appointments? car, drives self     Are you on dialysis? No     Do you take coumadin? No     Discharge Plan A Home     Discharge Plan B Home     DME Needed Upon Discharge  none     Discharge Plan discussed with: Patient     Discharge Barriers Identified None                        [facility]  Initial Discharge Assessment       Primary Care Provider: Ina Ellsworth NP    Admission Diagnosis: Rectal bleeding [K62.5]    Admission Date: 8/24/2022  Expected Discharge Date:     Discharge Barriers Identified: None    Payor: AETNA / Plan: [employer]ET[employer] CHOICE POS / Product Type: Commercial /     Extended Emergency Contact Information  Primary Emergency Contact: FOX HERNANDEZ  Mobile Phone: [phone]  Relation: Sister  Preferred language: English   needed? No    Discharge Plan A: Home  Discharge Plan B: Home      [facility] - [facility] [address] [facility] [address]  [address] [facility] [address]  [place] [address]  Phone: [phone] Fax: [phone]      Initial Assessment (most recent)     Adult Discharge Assessment - 08/25/22 1349        Discharge Assessment    Assessment Type Discharge Planning Assessment     Confirmed/corrected address, phone number and insurance Yes     Confirmed Demographics Correct on Facesheet     Source of Information patient     Communicated CELESTINA with patient/caregiver No     Do you expect to return to your current living situation? Yes     Prior to hospitilization cognitive status: Alert/Oriented     Current cognitive status: Alert/Oriented     Walking or Climbing Stairs Difficulty none     Dressing/Bathing Difficulty none     Equipment Currently Used at Home glucometer     Readmission within 30 days? No     Patient currently being followed by outpatient case management? No     Do you currently have service(s) that help  you manage your care at home? No     Do you take prescription medications? Yes     Do you have prescription coverage? Yes     Do you have any problems affording any of your prescribed medications? No     Is the patient taking medications as prescribed? yes     Who is going to help you get home at discharge? Yesenia, friend     How do you get to doctors appointments? car, drives self     Are you on dialysis? No     Do you take coumadin? No     Discharge Plan A Home     Discharge Plan B Home     DME Needed Upon Discharge  none     Discharge Plan discussed with: Patient     Discharge Barriers Identified None

## 2022-08-25 NOTE — ASSESSMENT & PLAN NOTE
Acute:  - GI consulted: appreciate recommendations  - NPO  - initial H/H: 23.9/38.7  - PPI ordered  - trend CBC  - on tele  - IVF

## 2022-08-25 NOTE — ANESTHESIA POSTPROCEDURE EVALUATION
Anesthesia Post Evaluation    Patient: Jules Floyd Jr.    Procedure(s) Performed: Procedure(s) (LRB):  COLONOSCOPY (N/A)  EGD (ESOPHAGOGASTRODUODENOSCOPY) (N/A)    Final Anesthesia Type: general      Patient location during evaluation: PACU  Patient participation: Yes- Able to Participate  Level of consciousness: sedated and awake  Post-procedure vital signs: reviewed and stable  Pain management: adequate  Airway patency: patent    PONV status at discharge: No PONV  Anesthetic complications: no      Cardiovascular status: hypertensive and blood pressure returned to baseline  Respiratory status: spontaneous ventilation  Hydration status: euvolemic  Follow-up not needed.          Vitals Value Taken Time   /63 08/25/22 1418   Temp  08/25/22 1422   Pulse 68 08/25/22 1422   Resp 16 08/25/22 1422   SpO2 97 % 08/25/22 1422   Vitals shown include unvalidated device data.      No case tracking events are documented in the log.      Pain/Kayla Score: No data recorded

## 2022-08-25 NOTE — PROGRESS NOTES
Pt seen and examined.  Continues with bright red blood per rectum, moderate to large amount especially now with prepping for colonoscopy.  Reports historical diarrhea with onset of bowel movement within minutes of eating.  Discussed with GI.  Plan for EGD/colonoscopy today.  Of note, H& H is stable he remains asymptomatic from an anemia standpoint.

## 2022-08-25 NOTE — SUBJECTIVE & OBJECTIVE
"Past Medical History:   Diagnosis Date    Anxiety     COVID-19     Diabetes mellitus     GERD (gastroesophageal reflux disease)     Hypertension        Past Surgical History:   Procedure Laterality Date    BACK SURGERY      COLOSTOMY  02/2019 2/2019    right knee arthroscope  1986       Review of patient's allergies indicates:   Allergen Reactions    Humalog audie kwikpen u-100  [insulin lispro] Hives, Itching, Rash and Swelling       No current facility-administered medications on file prior to encounter.     Current Outpatient Medications on File Prior to Encounter   Medication Sig    aspirin 81 MG Chew Take 81 mg by mouth once daily.    atorvastatin (LIPITOR) 80 MG tablet Take 1 tablet (80 mg total) by mouth once daily.    BD ULTRA-FINE SHELDON PEN NEEDLE 32 gauge x 5/32" Ndle 1 pen by Misc.(Non-Drug; Combo Route) route 3 (three) times daily.     buPROPion (WELLBUTRIN XL) 150 MG TB24 tablet Take 150 mg by mouth once daily.    cetirizine (ZYRTEC) 10 MG tablet Take 10 mg by mouth every evening.    fosinopril-hydrochlorothiazide (MONOPRIL-HCT) 20-12.5 mg per tablet Take 1 tablet by mouth once daily.    FREESTYLE LOREN 14 DAY SENSOR Kit USE AS DIRECTED    insulin aspart U-100 (NOVOLOG U-100 INSULIN ASPART) 100 unit/mL injection Use with VGO 40. TOTAL DAILY DOSE OF 76 units. (Patient taking differently: Inject 36 Units into the skin with lunch. Use with VGO 40. TOTAL DAILY DOSE OF 76 units.)    insulin degludec (TRESIBA FLEXTOUCH U-100) 100 unit/mL (3 mL) InPn Inject 60 units into the skin nightly. (Patient taking differently: Inject 60 Units as directed nightly. Inject 60 units into the skin nightly.)    insulin pump controller (OMNIPOD DASH PDM KIT) Misc Use with omnipod.    JARDIANCE 25 mg Tab Take 25 mg by mouth once daily.     meloxicam (MOBIC) 15 MG tablet Take 15 mg by mouth once daily.    meloxicam (MOBIC) 15 MG tablet Take 1 tablet (15 mg total) by mouth once daily.    meloxicam (MOBIC) 15 MG tablet TAKE ONE " TABLET BY MOUTH ONCE A DAY    metFORMIN (GLUCOPHAGE) 1000 MG tablet Take 1 tablet (1,000 mg total) by mouth 2 (two) times daily with meals.    metoprolol succinate (TOPROL-XL) 100 MG 24 hr tablet Take 100 mg by mouth once daily.    omeprazole (PRILOSEC) 20 MG capsule Take 20 mg by mouth every other day.     oxyCODONE-acetaminophen (PERCOCET)  mg per tablet Take 1 tablet by mouth 2 (two) times daily as needed.    PRODIGY NO CODING Strp 1 strip by Misc.(Non-Drug; Combo Route) route as needed.     PRODIGY TWIST TOP LANCET 28 gauge Misc 1 lancet by Misc.(Non-Drug; Combo Route) route 3 (three) times daily as needed.     semaglutide (OZEMPIC) 0.25 mg or 0.5 mg(2 mg/1.5 mL) PnIj Inject 0.25 mg into the skin every 7 days. Increase the dose to 0.5 mg after one month. (Patient taking differently: Inject 0.25 mg into the skin. Increase the dose to 0.5 mg after one month.)     Family History       Problem Relation (Age of Onset)    Arthritis Mother    Cancer Maternal Aunt    Diabetes Mother    Heart disease Mother    Hyperlipidemia Father    Hypertension Father          Tobacco Use    Smoking status: Never Smoker    Smokeless tobacco: Not on file   Substance and Sexual Activity    Alcohol use: Yes     Alcohol/week: 2.0 standard drinks     Types: 2 Cans of beer per week     Comment: occassional    Drug use: No    Sexual activity: Yes     Partners: Female     Review of Systems   Constitutional:  Negative for chills, fatigue and fever.   HENT:  Negative for congestion, sore throat and trouble swallowing.    Eyes:  Negative for visual disturbance.   Respiratory:  Negative for cough, shortness of breath and wheezing.    Cardiovascular:  Negative for chest pain, palpitations and leg swelling.   Gastrointestinal:  Positive for blood in stool and diarrhea. Negative for abdominal pain, nausea and vomiting.   Genitourinary:  Negative for difficulty urinating, dysuria and hematuria.   Musculoskeletal:  Positive for back pain.    Skin:  Negative for rash.   Neurological:  Negative for dizziness, light-headedness and headaches.   Psychiatric/Behavioral:  Negative for confusion.    Objective:     Vital Signs (Most Recent):  Temp: 97.8 °F (36.6 °C) (08/24/22 2318)  Pulse: 73 (08/25/22 0010)  Resp: 16 (08/25/22 0010)  BP: 113/71 (08/25/22 0010)  SpO2: 96 % (08/25/22 0010) Vital Signs (24h Range):  Temp:  [97.8 °F (36.6 °C)] 97.8 °F (36.6 °C)  Pulse:  [73-78] 73  Resp:  [16] 16  SpO2:  [96 %-100 %] 96 %  BP: (103-113)/(64-71) 113/71     Weight: 90.7 kg (200 lb)  Body mass index is 29.53 kg/m².    Physical Exam  Vitals and nursing note reviewed.   Constitutional:       General: He is not in acute distress.     Appearance: Normal appearance. He is normal weight. He is not ill-appearing.   HENT:      Head: Normocephalic and atraumatic.      Mouth/Throat:      Mouth: Mucous membranes are moist.      Pharynx: Oropharynx is clear.   Eyes:      Extraocular Movements: Extraocular movements intact.      Pupils: Pupils are equal, round, and reactive to light.   Cardiovascular:      Rate and Rhythm: Normal rate and regular rhythm.      Pulses: Normal pulses.      Heart sounds: Normal heart sounds.   Pulmonary:      Effort: Pulmonary effort is normal. No respiratory distress.      Breath sounds: Normal breath sounds. No wheezing, rhonchi or rales.   Abdominal:      General: Abdomen is flat. Bowel sounds are normal. There is no distension.      Palpations: Abdomen is soft.      Tenderness: There is no abdominal tenderness. There is no guarding or rebound.   Musculoskeletal:         General: Normal range of motion.      Cervical back: Normal range of motion and neck supple.   Skin:     General: Skin is warm.      Capillary Refill: Capillary refill takes 2 to 3 seconds.   Neurological:      General: No focal deficit present.      Mental Status: He is alert and oriented to person, place, and time. Mental status is at baseline.   Psychiatric:         Mood and  Affect: Mood normal.         Behavior: Behavior normal.         CRANIAL NERVES     CN III, IV, VI   Pupils are equal, round, and reactive to light.     Significant Labs: All pertinent labs within the past 24 hours have been reviewed.  CBC:   Recent Labs   Lab 08/24/22  2337   WBC 10.04   HGB 12.9*   HCT 38.7*        CMP:   Recent Labs   Lab 08/24/22  2337      K 4.1      CO2 25   *   BUN 14   CREATININE 0.8   CALCIUM 9.8   PROT 6.6   ALBUMIN 3.6   BILITOT 0.3   ALKPHOS 76   AST 13   ALT 23   ANIONGAP 11       Significant Imaging: I have reviewed all pertinent imaging results/findings within the past 24 hours.

## 2022-08-25 NOTE — CONSULTS
"[facility]     CC: Hematochezia    HPI 51 y.o. male with a PMHx of HTN, HLD, T2DM, GERD, and s/p gastric sleeve (4/2022) who presented to the ED with blood in stools onset x 0400 8/24. Patient reports 8 episodes of bloody bowel movements with bright red bleeding and clots. He states he had been taking alfonso back and body for the past 4 days and Toradol for past 5 days. He also states he is taking ASA daily. He reports hx of GI bleeding the past but "never to this extent." He reports colonoscopy 3 yrs ago which was normal to his knowledge. He also reports chronic back pain and diarrhea. He denies fever, chills, CP, palpitations, SOB, cough, abdominal pain, n/v, hematuria, dysuria, light headedness, and HA. ED workup was significant for GI bleed and anemia. Hospital Medicine was consulted for further workup and management of the patient.     FURTHER HISTORY:  Above obtained from independent review of records from admitting provider as well as from direct discussion with primary team NP who states patient has been using NSAIDs.  In addition, on my interview, I note the following:  Patient had gastric sleeve a few months ago.  Has a history of GI bleeding in the past evaluate by Dr. Garcia.  He now presents with hematochezia, recent onset, large amount, multiple episodes, with no alleviating/exacerbating factors.       Past Medical History:   Diagnosis Date    Anxiety     COVID-19     Diabetes mellitus     GERD (gastroesophageal reflux disease)     Hypertension        Past Surgical History:   Procedure Laterality Date    BACK SURGERY      COLOSTOMY  02/2019 2/2019    right knee arthroscope  1986       Social History     Tobacco Use    Smoking status: Never Smoker   Substance Use Topics    Alcohol use: Yes     Alcohol/week: 2.0 standard drinks     Types: 2 Cans of beer per week     Comment: occassional    Drug use: No       Family History   Problem Relation Age of Onset    Arthritis Mother  " "   Diabetes Mother     Heart disease Mother     Hyperlipidemia Father     Hypertension Father     Cancer Maternal Aunt        Review of Systems  General ROS: negative for - chills, fever or weight loss  Psychological ROS: negative for - hallucination, depression or suicidal ideation  Ophthalmic ROS: negative for - blurry vision, photophobia or eye pain  ENT ROS: negative for - epistaxis, sore throat or rhinorrhea  Respiratory ROS: no cough, shortness of breath, or wheezing  Cardiovascular ROS: no chest pain or dyspnea on exertion  Gastrointestinal ROS: as above  Genito-Urinary ROS: no dysuria, trouble voiding, or hematuria  Musculoskeletal ROS: negative for - arthralgia, myalgia, weakness  Neurological ROS: no syncope or seizures; no ataxia  Dermatological ROS: negative for pruritis, rash and jaundice    Physical Examination  /77 (BP Location: Left arm, Patient Position: Lying)   Pulse 73   Temp 98.8 °F (37.1 °C) (Skin)   Resp (!) 22   Ht 5' 9" (1.753 m)   Wt 88.9 kg (195 lb 15.8 oz)   SpO2 97%   BMI 28.94 kg/m²   General appearance: alert, cooperative, no distress  HENT: Normocephalic, atraumatic, neck symmetrical, no nasal discharge   Eyes: conjunctivae/corneas clear, PERRL, EOM's intact, sclera anicteric  Lungs: clear to auscultation bilaterally, no dullness to percussion bilaterally, symmetric expansion, breathing unlabored  Heart: regular rate and rhythm without rub; no displacement of the PMI   Abdomen: soft, NT  Extremities: extremities symmetric; no clubbing, cyanosis, or edema  Integument: Skin color, texture, turgor normal; no rashes; hair distrubution normal, no jaundice  Neurologic: Alert and oriented X 3, no focal sensory or motor neurologic deficits  Psychiatric: no pressured speech; normal affect; no evidence of impaired cognition, no anxiety/depression     Labs:  Lab Results   Component Value Date    WBC 7.76 08/25/2022    HGB 11.1 (L) 08/25/2022    HCT 33.3 (L) 08/25/2022    MCV 88 " 08/25/2022     08/25/2022         CMP  Sodium   Date Value Ref Range Status   08/25/2022 142 136 - 145 mmol/L Final   06/18/2017 140 134 - 144 mmol/L      Potassium   Date Value Ref Range Status   08/25/2022 4.0 3.5 - 5.1 mmol/L Final     Chloride   Date Value Ref Range Status   08/25/2022 109 95 - 110 mmol/L Final   06/18/2017 107 98 - 110 mmol/L      CO2   Date Value Ref Range Status   08/25/2022 24 23 - 29 mmol/L Final     Glucose   Date Value Ref Range Status   08/25/2022 88 70 - 110 mg/dL Final   06/18/2017 161 (H) 70 - 99 mg/dL      BUN   Date Value Ref Range Status   08/25/2022 14 6 - 20 mg/dL Final     Creatinine   Date Value Ref Range Status   08/25/2022 0.7 0.5 - 1.4 mg/dL Final   06/18/2017 0.86 0.60 - 1.40 mg/dL    01/13/2013 1.5 (H) 0.5 - 1.4 mg/dL Final     Calcium   Date Value Ref Range Status   08/25/2022 8.8 8.7 - 10.5 mg/dL Final   01/13/2013 9.4 8.7 - 10.5 mg/dL Final     Total Protein   Date Value Ref Range Status   08/25/2022 5.7 (L) 6.0 - 8.4 g/dL Final     Albumin   Date Value Ref Range Status   08/25/2022 3.2 (L) 3.5 - 5.2 g/dL Final     Total Bilirubin   Date Value Ref Range Status   08/25/2022 0.4 0.1 - 1.0 mg/dL Final     Comment:     For infants and newborns, interpretation of results should be based  on gestational age, weight and in agreement with clinical  observations.    Premature Infant recommended reference ranges:  Up to 24 hours.............<8.0 mg/dL  Up to 48 hours............<12.0 mg/dL  3-5 days..................<15.0 mg/dL  6-29 days.................<15.0 mg/dL       Alkaline Phosphatase   Date Value Ref Range Status   08/25/2022 68 55 - 135 U/L Final   01/13/2013 63 55 - 135 U/L Final     AST   Date Value Ref Range Status   08/25/2022 12 10 - 40 U/L Final   01/13/2013 11 10 - 40 U/L Final     ALT   Date Value Ref Range Status   08/25/2022 21 10 - 44 U/L Final     Anion Gap   Date Value Ref Range Status   08/25/2022 9 8 - 16 mmol/L Final   01/13/2013 14 5 - 15 meq/L  Final     eGFR if    Date Value Ref Range Status   02/24/2021 >60.0 >60 mL/min/1.73 m^2 Final     eGFR if non    Date Value Ref Range Status   02/24/2021 >60.0 >60 mL/min/1.73 m^2 Final     Comment:     Calculation used to obtain the estimated glomerular filtration  rate (eGFR) is the CKD-EPI equation.            Imaging:  Past CXR was independently visualized and reviewed by me and showed no acute process.    I have personally reviewed these images    Case discussed as above    Assessment:   1.  Hematochezia  2.  Anemia  3.  NSAID use  4.  History of gastric sleeve    Plan:  1.  NPO  2.  PPI  3.  EGD/colonoscopy  4.  Further recommendations to follow after above.  5.  Communication will be sent to the referring provider regarding my assessment and plan on this patient via EPIC.      Bin Shah MD  [facility]  [address] [place], [address]  [place] [address]  Office: [phone]  Fax: [phone]

## 2022-08-25 NOTE — PROGRESS NOTES
EGD/colonoscopy done.  No active bleeding at the time of scope but copious old blood and clot evacuated from the colon.  Diverticulosis noted which was the likely source of bleeding.  If bleeding recurs, recommend STAT CTA abdomen and pelvis with GIB protocol.  Resume diet and discharge home if no further evidence of bleeding.  GI to sign off.  Call for questions.

## 2022-08-25 NOTE — PROVATION PATIENT INSTRUCTIONS
Discharge Summary/Instructions after an Endoscopic Procedure  Patient Name: Jules Floyd  Patient MRN: [id number]  Patient YOB: 1970  Thursday, August 25, 2022  Bin Shah MD  Dear patient,  As a result of recent federal legislation (The Federal Cures Act), you may   receive lab or pathology results from your procedure in your [facility]   account before your physician is able to contact you. Your physician or   their representative will relay the results to you with their   recommendations at their soonest availability.  Thank you,  RESTRICTIONS:  During your procedure today, you received medications for sedation.  These   medications may affect your judgment, balance and coordination.  Therefore,   for 24 hours, you have the following restrictions:   - DO NOT drive a car, operate machinery, make legal/financial decisions,   sign important papers or drink alcohol.    ACTIVITY:  Today: no heavy lifting, straining or running due to procedural   sedation/anesthesia.  The following day: return to full activity including work.  DIET:  Eat and drink normally unless instructed otherwise.     TREATMENT FOR COMMON SIDE EFFECTS:  - Mild abdominal pain, nausea, belching, bloating or excessive gas:  rest,   eat lightly and use a heating pad.  - Sore Throat: treat with throat lozenges and/or gargle with warm salt   water.  - Because air was used during the procedure, expelling large amounts of air   from your rectum or belching is normal.  - If a bowel prep was taken, you may not have a bowel movement for 1-3 days.    This is normal.  SYMPTOMS TO WATCH FOR AND REPORT TO YOUR PHYSICIAN:  1. Abdominal pain or bloating, other than gas cramps.  2. Chest pain.  3. Back pain.  4. Signs of infection such as: chills or fever occurring within 24 hours   after the procedure.  5. Rectal bleeding, which would show as bright red, maroon, or black stools.   (A tablespoon of blood from the rectum is not serious, especially if    hemorrhoids are present.)  6. Vomiting.  7. Weakness or dizziness.  GO DIRECTLY TO THE NEAREST EMERGENCY ROOM IF YOU HAVE ANY OF THE FOLLOWING:      Difficulty breathing              Chills and/or fever over 101 F   Persistent vomiting and/or vomiting blood   Severe abdominal pain   Severe chest pain   Black, tarry stools   Bleeding- more than one tablespoon   Any other symptom or condition that you feel may need urgent attention  Your doctor recommends these additional instructions:  If any biopsies were taken, your doctors clinic will contact you in 1 to 2   weeks with any results.  - High fiber diet.   - Continue present medications.   - Return patient to [facility] for [facility] care.   - No aspirin, ibuprofen, naproxen, or other non-steroidal anti-inflammatory   drugs.   - Repeat colonoscopy in 5 years for surveillance.   - Perform a CT scan (computed tomography) of abdomen with contrast and   pelvis with contrast if symptoms persist.   - Return to my office PRN.  For questions, problems or results please call your physician - Bin Shah MD at Work:  [phone].  [facility], EMERGENCY ROOM PHONE NUMBER: [phone]  IF A COMPLICATION OR EMERGENCY SITUATION ARISES AND YOU ARE UNABLE TO REACH   YOUR PHYSICIAN - GO DIRECTLY TO THE EMERGENCY ROOM.  Bin Shah MD  8/25/2022 2:10:51 PM  This report has been verified and signed electronically.  Dear patient,  As a result of recent federal legislation (The Federal Cures Act), you may   receive lab or pathology results from your procedure in your [facility]   account before your physician is able to contact you. Your physician or   their representative will relay the results to you with their   recommendations at their soonest availability.  Thank you,  PROVATION

## 2022-08-25 NOTE — ASSESSMENT & PLAN NOTE
Patient's FSGs are controlled on current medication regimen.  Last A1c reviewed-   Lab Results   Component Value Date    HGBA1C 10.3 (H) 07/28/2020     Most recent fingerstick glucose reviewed- No results for input(s): POCTGLUCOSE in the last 24 hours.  Current correctional scale  Medium  Maintain anti-hyperglycemic dose as follows-   Antihyperglycemics (From admission, onward)            Start     Stop Route Frequency Ordered    08/25/22 0148  insulin aspart U-100 pen 1-10 Units         -- SubQ Every 6 hours PRN 08/25/22 0049        Hold Oral hypoglycemics while patient is in the hospital.

## 2022-08-25 NOTE — CARE UPDATE
08/25/22 0700   PRE-TX-O2   O2 Device (Oxygen Therapy) room air   SpO2 99 %   Pulse Oximetry Type Intermittent   $ Pulse Oximetry - Multiple Charge Pulse Oximetry - Multiple   Pulse 64   Resp 16

## 2022-08-25 NOTE — PLAN OF CARE
Plan of care reviewed with patient & wife. IV fluids infusing as per orders. NPO , Golytely prep as per orders. SR on telemetry. No complaints of pain . Remain free from falls/injury. Instructed to call for assistance as needed during night,verbalized understanding. Call light in reach.

## 2022-08-25 NOTE — H&P
"[facility]  History & Physical    Patient Name: Jules Floyd Jr.  MRN: [id number]  Patient Class: OP- Observation  Admission Date: 8/24/2022  Attending Physician: Demarco Davidson MD   Primary Care Provider: Ina Ellsworth NP         Patient information was obtained from patient, spouse/SO, past medical records and ER records.     Subjective:     Principal Problem:Rectal bleeding    Chief Complaint:   Chief Complaint   Patient presents with    Rectal Bleeding     Bright red, started last 0400 today.         HPI: Jules Floyd Jr. is a 51 y.o. y.o. male with a PMHx of HTN, HLD, T2DM, GERD, and s/p gastric sleeve (4/2022) who presented to the ED with blood in stools onset x 0400 8/24. Patient reports 8 episodes of bloody bowel movements with bright red bleeding and clots. He states he had been taking alfonso back and body for the past 4 days and Toradol for past 5 days. He also states he is taking ASA daily. He reports hx of GI bleeding the past but "never to this extent." He reports colonoscopy 3 yrs ago which was normal to his knowledge. He also reports chronic back pain and diarrhea. He denies fever, chills, CP, palpitations, SOB, cough, abdominal pain, n/v, hematuria, dysuria, light headedness, and HA. ED workup was significant for GI bleed and anemia. Hospital Medicine was consulted for further workup and management of the patient.           Past Medical History:   Diagnosis Date    Anxiety     COVID-19     Diabetes mellitus     GERD (gastroesophageal reflux disease)     Hypertension        Past Surgical History:   Procedure Laterality Date    BACK SURGERY      COLOSTOMY  02/2019 2/2019    right knee arthroscope  1986       Review of patient's allergies indicates:   Allergen Reactions    Humalog audie kwikpen u-100  [insulin lispro] Hives, Itching, Rash and Swelling       No current facility-administered medications on file prior to encounter.     Current " "Outpatient Medications on File Prior to Encounter   Medication Sig    aspirin 81 MG Chew Take 81 mg by mouth once daily.    atorvastatin (LIPITOR) 80 MG tablet Take 1 tablet (80 mg total) by mouth once daily.    BD ULTRA-FINE SHELDON PEN NEEDLE 32 gauge x 5/32" Ndle 1 pen by Misc.(Non-Drug; Combo Route) route 3 (three) times daily.     buPROPion (WELLBUTRIN XL) 150 MG TB24 tablet Take 150 mg by mouth once daily.    cetirizine (ZYRTEC) 10 MG tablet Take 10 mg by mouth every evening.    fosinopril-hydrochlorothiazide (MONOPRIL-HCT) 20-12.5 mg per tablet Take 1 tablet by mouth once daily.    FREESTYLE LOREN 14 DAY SENSOR Kit USE AS DIRECTED    insulin aspart U-100 (NOVOLOG U-100 INSULIN ASPART) 100 unit/mL injection Use with VGO 40. TOTAL DAILY DOSE OF 76 units. (Patient taking differently: Inject 36 Units into the skin with lunch. Use with VGO 40. TOTAL DAILY DOSE OF 76 units.)    insulin degludec (TRESIBA FLEXTOUCH U-100) 100 unit/mL (3 mL) InPn Inject 60 units into the skin nightly. (Patient taking differently: Inject 60 Units as directed nightly. Inject 60 units into the skin nightly.)    insulin pump controller (OMNIPOD DASH PDM KIT) Misc Use with omnipod.    JARDIANCE 25 mg Tab Take 25 mg by mouth once daily.     meloxicam (MOBIC) 15 MG tablet Take 15 mg by mouth once daily.    meloxicam (MOBIC) 15 MG tablet Take 1 tablet (15 mg total) by mouth once daily.    meloxicam (MOBIC) 15 MG tablet TAKE ONE TABLET BY MOUTH ONCE A DAY    metFORMIN (GLUCOPHAGE) 1000 MG tablet Take 1 tablet (1,000 mg total) by mouth 2 (two) times daily with meals.    metoprolol succinate (TOPROL-XL) 100 MG 24 hr tablet Take 100 mg by mouth once daily.    omeprazole (PRILOSEC) 20 MG capsule Take 20 mg by mouth every other day.     oxyCODONE-acetaminophen (PERCOCET)  mg per tablet Take 1 tablet by mouth 2 (two) times daily as needed.    PRODIGY NO CODING Strp 1 strip by Misc.(Non-Drug; Combo Route) route as needed.     " PRODIGY TWIST TOP LANCET 28 gauge Misc 1 lancet by Misc.(Non-Drug; Combo Route) route 3 (three) times daily as needed.     semaglutide (OZEMPIC) 0.25 mg or 0.5 mg(2 mg/1.5 mL) PnIj Inject 0.25 mg into the skin every 7 days. Increase the dose to 0.5 mg after one month. (Patient taking differently: Inject 0.25 mg into the skin. Increase the dose to 0.5 mg after one month.)     Family History       Problem Relation (Age of Onset)    Arthritis Mother    Cancer Maternal Aunt    Diabetes Mother    Heart disease Mother    Hyperlipidemia Father    Hypertension Father          Tobacco Use    Smoking status: Never Smoker    Smokeless tobacco: Not on file   Substance and Sexual Activity    Alcohol use: Yes     Alcohol/week: 2.0 standard drinks     Types: 2 Cans of beer per week     Comment: occassional    Drug use: No    Sexual activity: Yes     Partners: Female     Review of Systems   Constitutional:  Negative for chills, fatigue and fever.   HENT:  Negative for congestion, sore throat and trouble swallowing.    Eyes:  Negative for visual disturbance.   Respiratory:  Negative for cough, shortness of breath and wheezing.    Cardiovascular:  Negative for chest pain, palpitations and leg swelling.   Gastrointestinal:  Positive for blood in stool and diarrhea. Negative for abdominal pain, nausea and vomiting.   Genitourinary:  Negative for difficulty urinating, dysuria and hematuria.   Musculoskeletal:  Positive for back pain.   Skin:  Negative for rash.   Neurological:  Negative for dizziness, light-headedness and headaches.   Psychiatric/Behavioral:  Negative for confusion.    Objective:     Vital Signs (Most Recent):  Temp: 97.8 °F (36.6 °C) (08/24/22 2318)  Pulse: 73 (08/25/22 0010)  Resp: 16 (08/25/22 0010)  BP: 113/71 (08/25/22 0010)  SpO2: 96 % (08/25/22 0010) Vital Signs (24h Range):  Temp:  [97.8 °F (36.6 °C)] 97.8 °F (36.6 °C)  Pulse:  [73-78] 73  Resp:  [16] 16  SpO2:  [96 %-100 %] 96 %  BP: (103-113)/(64-71)  113/71     Weight: 90.7 kg (200 lb)  Body mass index is 29.53 kg/m².    Physical Exam  Vitals and nursing note reviewed.   Constitutional:       General: He is not in acute distress.     Appearance: Normal appearance. He is normal weight. He is not ill-appearing.   HENT:      Head: Normocephalic and atraumatic.      Mouth/Throat:      Mouth: Mucous membranes are moist.      Pharynx: Oropharynx is clear.   Eyes:      Extraocular Movements: Extraocular movements intact.      Pupils: Pupils are equal, round, and reactive to light.   Cardiovascular:      Rate and Rhythm: Normal rate and regular rhythm.      Pulses: Normal pulses.      Heart sounds: Normal heart sounds.   Pulmonary:      Effort: Pulmonary effort is normal. No respiratory distress.      Breath sounds: Normal breath sounds. No wheezing, rhonchi or rales.   Abdominal:      General: Abdomen is flat. Bowel sounds are normal. There is no distension.      Palpations: Abdomen is soft.      Tenderness: There is no abdominal tenderness. There is no guarding or rebound.   Musculoskeletal:         General: Normal range of motion.      Cervical back: Normal range of motion and neck supple.   Skin:     General: Skin is warm.      Capillary Refill: Capillary refill takes 2 to 3 seconds.   Neurological:      General: No focal deficit present.      Mental Status: He is alert and oriented to person, place, and time. Mental status is at baseline.   Psychiatric:         Mood and Affect: Mood normal.         Behavior: Behavior normal.         CRANIAL NERVES     CN III, IV, VI   Pupils are equal, round, and reactive to light.     Significant Labs: All pertinent labs within the past 24 hours have been reviewed.  CBC:   Recent Labs   Lab 08/24/22  2337   WBC 10.04   HGB 12.9*   HCT 38.7*        CMP:   Recent Labs   Lab 08/24/22  2337      K 4.1      CO2 25   *   BUN 14   CREATININE 0.8   CALCIUM 9.8   PROT 6.6   ALBUMIN 3.6   BILITOT 0.3   ALKPHOS 76    AST 13   ALT 23   ANIONGAP 11       Significant Imaging: I have reviewed all pertinent imaging results/findings within the past 24 hours.    Assessment/Plan:     * Rectal bleeding  Acute:  - GI consulted: appreciate recommendations  - NPO  - initial H/H: 23.9/38.7  - PPI ordered  - trend CBC  - on tele  - [facility]          GERD (gastroesophageal reflux disease)  Chronic:  - IV PPI ordered      Hx of gastric bypass  Noted.   - history of gastric bypass 4/2022      Hypertension  Chronic, controlled.  Latest blood pressure and vitals reviewed-   Temp:  [97.8 °F (36.6 °C)]   Pulse:  [73-78]   Resp:  [16]   BP: (103-113)/(64-71)   SpO2:  [96 %-100 %] .   Home meds for hypertension were reviewed and noted below.   Hypertension Medications             fosinopril-hydrochlorothiazide (MONOPRIL-HCT) 20-12.5 mg per tablet Take 1 tablet by mouth once daily.    metoprolol succinate (TOPROL-XL) 100 MG 24 hr tablet Take 100 mg by mouth once daily.          While in the hospital, will manage blood pressure as follows; Adjust home antihypertensive regimen as follows- pt is only taking Metoprolol at home which is reordered    Will utilize p.r.n. blood pressure medication only if patient's blood pressure greater than  180/110 and he develops symptoms such as worsening chest pain or shortness of breath.        Diabetes mellitus 2  Patient's FSGs are controlled on current medication regimen.  Last A1c reviewed-   Lab Results   Component Value Date    HGBA1C 10.3 (H) 07/28/2020     Most recent fingerstick glucose reviewed- No results for input(s): POCTGLUCOSE in the last 24 hours.  Current correctional scale  Medium  Maintain anti-hyperglycemic dose as follows-   Antihyperglycemics (From admission, onward)            Start     Stop Route Frequency Ordered    08/25/22 0148  insulin aspart U-100 pen 1-10 Units         -- SubQ Every 6 hours PRN 08/25/22 0049        Hold Oral hypoglycemics while patient is in the hospital.      VTE Risk  Mitigation (From admission, onward)         Ordered     Reason for no Mechanical VTE Prophylaxis  Once        Question:  Reasons:  Answer:  Active Bleeding    08/25/22 0047     IP VTE HIGH RISK PATIENT  Once         08/25/22 0047                   Faiza Richard PA-C  Department of Hospital Medicine   [facility] - Emergency Dept

## 2022-08-25 NOTE — ANESTHESIA PREPROCEDURE EVALUATION
08/25/2022  Jules Floyd Jr. is a 51 y.o., male.      Pre-op Assessment    I have reviewed the Patient Summary Reports.     I have reviewed the Nursing Notes. I have reviewed the NPO Status.   I have reviewed the Medications.     Review of Systems  Cardiovascular:   Hypertension    Pulmonary:  Pulmonary Normal    Hepatic/GI:   Bowel Prep. GERD    Neurological:  Neurology Normal    Endocrine:   Diabetes        Physical Exam  General: Well nourished    Airway:  Mallampati: III / II  Neck ROM: Normal ROM    Dental:  Intact    Chest/Lungs:  Clear to auscultation, Normal Respiratory Rate    Heart:  Rate: Normal  Rhythm: Regular Rhythm        Anesthesia Plan  Type of Anesthesia, risks & benefits discussed:    Anesthesia Type: Gen Natural Airway  Intra-op Monitoring Plan: Standard ASA Monitors  Induction:  IV  Informed Consent: Informed consent signed with the Patient and all parties understand the risks and agree with anesthesia plan.  All questions answered.   ASA Score: 2    Ready For Surgery From Anesthesia Perspective.     .

## 2022-08-25 NOTE — ASSESSMENT & PLAN NOTE
Chronic, controlled.  Latest blood pressure and vitals reviewed-   Temp:  [97.8 °F (36.6 °C)]   Pulse:  [73-78]   Resp:  [16]   BP: (103-113)/(64-71)   SpO2:  [96 %-100 %] .   Home meds for hypertension were reviewed and noted below.   Hypertension Medications             fosinopril-hydrochlorothiazide (MONOPRIL-HCT) 20-12.5 mg per tablet Take 1 tablet by mouth once daily.    metoprolol succinate (TOPROL-XL) 100 MG 24 hr tablet Take 100 mg by mouth once daily.          While in the hospital, will manage blood pressure as follows; Adjust home antihypertensive regimen as follows- pt is only taking Metoprolol at home which is reordered    Will utilize p.r.n. blood pressure medication only if patient's blood pressure greater than  180/110 and he develops symptoms such as worsening chest pain or shortness of breath.

## 2022-08-26 VITALS
BODY MASS INDEX: 29.03 KG/M2 | RESPIRATION RATE: 16 BRPM | DIASTOLIC BLOOD PRESSURE: 80 MMHG | OXYGEN SATURATION: 94 % | HEIGHT: 69 IN | WEIGHT: 196 LBS | HEART RATE: 92 BPM | TEMPERATURE: 99 F | SYSTOLIC BLOOD PRESSURE: 123 MMHG

## 2022-08-26 LAB
BASOPHILS # BLD AUTO: 0.02 K/UL (ref 0–0.2)
BASOPHILS # BLD AUTO: 0.02 K/UL (ref 0–0.2)
BASOPHILS # BLD AUTO: 0.03 K/UL (ref 0–0.2)
BASOPHILS NFR BLD: 0.2 % (ref 0–1.9)
BASOPHILS NFR BLD: 0.3 % (ref 0–1.9)
BASOPHILS NFR BLD: 0.3 % (ref 0–1.9)
DIFFERENTIAL METHOD: ABNORMAL
EOSINOPHIL # BLD AUTO: 0.3 K/UL (ref 0–0.5)
EOSINOPHIL # BLD AUTO: 0.4 K/UL (ref 0–0.5)
EOSINOPHIL # BLD AUTO: 0.4 K/UL (ref 0–0.5)
EOSINOPHIL NFR BLD: 4.3 % (ref 0–8)
EOSINOPHIL NFR BLD: 4.7 % (ref 0–8)
EOSINOPHIL NFR BLD: 4.9 % (ref 0–8)
ERYTHROCYTE [DISTWIDTH] IN BLOOD BY AUTOMATED COUNT: 13.6 % (ref 11.5–14.5)
ERYTHROCYTE [DISTWIDTH] IN BLOOD BY AUTOMATED COUNT: 13.7 % (ref 11.5–14.5)
ERYTHROCYTE [DISTWIDTH] IN BLOOD BY AUTOMATED COUNT: 13.7 % (ref 11.5–14.5)
HCT VFR BLD AUTO: 27.5 % (ref 40–54)
HCT VFR BLD AUTO: 27.7 % (ref 40–54)
HCT VFR BLD AUTO: 27.9 % (ref 40–54)
HGB BLD-MCNC: 9.4 G/DL (ref 14–18)
HGB BLD-MCNC: 9.4 G/DL (ref 14–18)
HGB BLD-MCNC: 9.5 G/DL (ref 14–18)
IMM GRANULOCYTES # BLD AUTO: 0.03 K/UL (ref 0–0.04)
IMM GRANULOCYTES # BLD AUTO: 0.03 K/UL (ref 0–0.04)
IMM GRANULOCYTES # BLD AUTO: 0.04 K/UL (ref 0–0.04)
IMM GRANULOCYTES NFR BLD AUTO: 0.4 % (ref 0–0.5)
IMM GRANULOCYTES NFR BLD AUTO: 0.4 % (ref 0–0.5)
IMM GRANULOCYTES NFR BLD AUTO: 0.5 % (ref 0–0.5)
LYMPHOCYTES # BLD AUTO: 1 K/UL (ref 1–4.8)
LYMPHOCYTES # BLD AUTO: 1.1 K/UL (ref 1–4.8)
LYMPHOCYTES # BLD AUTO: 1.3 K/UL (ref 1–4.8)
LYMPHOCYTES NFR BLD: 12.5 % (ref 18–48)
LYMPHOCYTES NFR BLD: 14.8 % (ref 18–48)
LYMPHOCYTES NFR BLD: 15.3 % (ref 18–48)
MCH RBC QN AUTO: 29.7 PG (ref 27–31)
MCH RBC QN AUTO: 29.7 PG (ref 27–31)
MCH RBC QN AUTO: 29.9 PG (ref 27–31)
MCHC RBC AUTO-ENTMCNC: 33.9 G/DL (ref 32–36)
MCHC RBC AUTO-ENTMCNC: 34.1 G/DL (ref 32–36)
MCHC RBC AUTO-ENTMCNC: 34.2 G/DL (ref 32–36)
MCV RBC AUTO: 87 FL (ref 82–98)
MCV RBC AUTO: 87 FL (ref 82–98)
MCV RBC AUTO: 88 FL (ref 82–98)
MONOCYTES # BLD AUTO: 0.7 K/UL (ref 0.3–1)
MONOCYTES # BLD AUTO: 0.7 K/UL (ref 0.3–1)
MONOCYTES # BLD AUTO: 0.8 K/UL (ref 0.3–1)
MONOCYTES NFR BLD: 7.5 % (ref 4–15)
MONOCYTES NFR BLD: 9.4 % (ref 4–15)
MONOCYTES NFR BLD: 9.5 % (ref 4–15)
NEUTROPHILS # BLD AUTO: 5 K/UL (ref 1.8–7.7)
NEUTROPHILS # BLD AUTO: 6 K/UL (ref 1.8–7.7)
NEUTROPHILS # BLD AUTO: 6.3 K/UL (ref 1.8–7.7)
NEUTROPHILS NFR BLD: 69.8 % (ref 38–73)
NEUTROPHILS NFR BLD: 72.6 % (ref 38–73)
NEUTROPHILS NFR BLD: 72.6 % (ref 38–73)
NRBC BLD-RTO: 0 /100 WBC
PLATELET # BLD AUTO: 222 K/UL (ref 150–450)
PLATELET # BLD AUTO: 226 K/UL (ref 150–450)
PLATELET # BLD AUTO: 227 K/UL (ref 150–450)
PMV BLD AUTO: 9.8 FL (ref 9.2–12.9)
PMV BLD AUTO: 9.9 FL (ref 9.2–12.9)
PMV BLD AUTO: 9.9 FL (ref 9.2–12.9)
RBC # BLD AUTO: 3.17 M/UL (ref 4.6–6.2)
RBC # BLD AUTO: 3.17 M/UL (ref 4.6–6.2)
RBC # BLD AUTO: 3.18 M/UL (ref 4.6–6.2)
WBC # BLD AUTO: 7.08 K/UL (ref 3.9–12.7)
WBC # BLD AUTO: 8.29 K/UL (ref 3.9–12.7)
WBC # BLD AUTO: 8.66 K/UL (ref 3.9–12.7)

## 2022-08-26 PROCEDURE — 36415 COLL VENOUS BLD VENIPUNCTURE: CPT

## 2022-08-26 PROCEDURE — 94761 N-INVAS EAR/PLS OXIMETRY MLT: CPT

## 2022-08-26 PROCEDURE — 85025 COMPLETE CBC W/AUTO DIFF WBC: CPT

## 2022-08-26 PROCEDURE — 99214 OFFICE O/P EST MOD 30 MIN: CPT | Mod: ,,, | Performed by: INTERNAL MEDICINE

## 2022-08-26 PROCEDURE — 96376 TX/PRO/DX INJ SAME DRUG ADON: CPT

## 2022-08-26 PROCEDURE — 63600175 PHARM REV CODE 636 W HCPCS

## 2022-08-26 PROCEDURE — G0378 HOSPITAL OBSERVATION PER HR: HCPCS

## 2022-08-26 PROCEDURE — C9113 INJ PANTOPRAZOLE SODIUM, VIA: HCPCS

## 2022-08-26 PROCEDURE — 85025 COMPLETE CBC W/AUTO DIFF WBC: CPT | Mod: 91 | Performed by: NURSE PRACTITIONER

## 2022-08-26 PROCEDURE — 25000003 PHARM REV CODE 250

## 2022-08-26 PROCEDURE — 36415 COLL VENOUS BLD VENIPUNCTURE: CPT | Performed by: NURSE PRACTITIONER

## 2022-08-26 PROCEDURE — 99214 PR OFFICE/OUTPT VISIT, EST, LEVL IV, 30-39 MIN: ICD-10-PCS | Mod: ,,, | Performed by: INTERNAL MEDICINE

## 2022-08-26 RX ORDER — METOPROLOL SUCCINATE 50 MG/1
50 TABLET, EXTENDED RELEASE ORAL 2 TIMES DAILY
Status: DISCONTINUED | OUTPATIENT
Start: 2022-08-26 | End: 2022-08-26 | Stop reason: HOSPADM

## 2022-08-26 RX ADMIN — PANTOPRAZOLE SODIUM 40 MG: 40 INJECTION, POWDER, LYOPHILIZED, FOR SOLUTION INTRAVENOUS at 10:08

## 2022-08-26 RX ADMIN — BUPROPION HYDROCHLORIDE 150 MG: 150 TABLET, FILM COATED, EXTENDED RELEASE ORAL at 10:08

## 2022-08-26 NOTE — PROGRESS NOTES
"[facility] Note    CC: Hematochezia    HPI 51 y.o. male with a PMHx of HTN, HLD, T2DM, GERD, and s/p gastric sleeve (4/2022) who presented to the ED with blood in stools onset x 0400 8/24. Patient reports 8 episodes of bloody bowel movements with bright red bleeding and clots. He states he had been taking alfonso back and body for the past 4 days and Toradol for past 5 days. He also states he is taking ASA daily. He reports hx of GI bleeding the past but "never to this extent." He reports colonoscopy 3 yrs ago which was normal to his knowledge. He also reports chronic back pain and diarrhea. He denies fever, chills, CP, palpitations, SOB, cough, abdominal pain, n/v, hematuria, dysuria, light headedness, and HA. ED workup was significant for GI bleed and anemia. Hospital Medicine was consulted for further workup and management of the patient.      FURTHER HISTORY:  Above obtained from independent review of records from admitting provider as well as from direct discussion with primary team NP who states patient has been using NSAIDs.  In addition, on my interview, I note the following:  Patient had gastric sleeve a few months ago.  Has a history of GI bleeding in the past evaluate by Dr. Garcia.  He now presents with hematochezia, recent onset, large amount, multiple episodes, with no alleviating/exacerbating factors.     INTERVAL HISTORY:  Per NP on primary team, patient had some blood in stool last night and H/H trended down a little further but stools appear to be clearing today.  Patient feels well.  Tolerating PO.  CTA overnight negative for acute bleeding.    Past Medical History:   Diagnosis Date    Anxiety     COVID-19     Diabetes mellitus     GERD (gastroesophageal reflux disease)     Hypertension          Review of Systems  General ROS: negative for - chills, fever or weight loss  Cardiovascular ROS: no chest pain or dyspnea on exertion  Gastrointestinal ROS: as above    Physical " "Examination  /72   Pulse 84   Temp 98.2 °F (36.8 °C)   Resp 15   Ht 5' 9" (1.753 m)   Wt 88.9 kg (195 lb 15.8 oz)   SpO2 95%   BMI 28.94 kg/m²   General appearance: alert, cooperative, no distress  HENT: Normocephalic, atraumatic, neck symmetrical, no nasal discharge, sclera anicteric   Lungs: clear to auscultation bilaterally, symmetric chest wall expansion bilaterally  Heart: regular rate and rhythm without rub; no displacement of the PMI   Abdomen: soft NT  Extremities: extremities symmetric; no clubbing, cyanosis, or edema  Neurologic: Alert and oriented X 3, no sensory or motor neurologic deficits      Labs:  Lab Results   Component Value Date    WBC 8.29 08/26/2022    HGB 9.4 (L) 08/26/2022    HCT 27.7 (L) 08/26/2022    MCV 87 08/26/2022     08/26/2022           Imaging:  CTA as above in HPI.    Assessment:   1.  Hematochezia  2.  Diverticulosis with bleeding - now resolved  3.  Anemia    Plan:  1.  Recheck CBC.  If stablilizing then OK to discharge home as he is clinically much improved and I suspect he is simply clearing residual blood in the colon  2.  Diet as tolerated  3.  Follow up as outpatient.  GI to sign off for now.  Call for questions.    Bin Shah MD  [facility]  [address], [address]  [place] [address]  Office: [phone]  Fax: [phone]    "

## 2022-08-26 NOTE — HOSPITAL COURSE
Pt was monitored closely during his stay.  His complete blood count was monitored closely and did show some decrease with ongoing GI bleed during his stay.  He underwent EGD and colonoscopy on 08/25 with no findings of acute active bleeding, but lots of old blood was found in the colon felt to be diverticular bleed in nature.  GI followed Pt throughout his stay.  He had mild amount of bleeding after scope and CTA with GI bleed protocol was done with no evidence of active bleeding.  His bowel output improved from a bleeding perspective and his H& H did stabilize on 08/26.  He was D/C home with repeat CBC ordered for early next week.  He was given strict return precautions regarding GI bleeding and symptomatic anemia.  Pt and wife verbalized understanding and agreement with discharge plan.    Pt was evaluated on day of discharge and deemed appropriate for discharge.

## 2022-08-26 NOTE — DISCHARGE SUMMARY
"[facility] Hospital Medicine  Discharge Summary      Patient Name: Jules Floyd Jr.  MRN: [id number]  Patient Class: OP- Observation  Admission Date: 8/24/2022  Hospital Length of Stay: 0 days  Discharge Date and Time:  08/26/2022 11:32 AM  Attending Physician: Jason Singh MD   Discharging Provider: Lauren Arias NP  Primary Care Provider: Ina Ellsworth NP      HPI:   Jules Floyd Jr. is a 51 y.o. y.o. male with a PMHx of HTN, HLD, T2DM, GERD, and s/p gastric sleeve (4/2022) who presented to the ED with blood in stools onset x 0400 8/24. Patient reports 8 episodes of bloody bowel movements with bright red bleeding and clots. He states he had been taking alfonso back and body for the past 4 days and Toradol for past 5 days. He also states he is taking ASA daily. He reports hx of GI bleeding the past but "never to this extent." He reports colonoscopy 3 yrs ago which was normal to his knowledge. He also reports chronic back pain and diarrhea. He denies fever, chills, CP, palpitations, SOB, cough, abdominal pain, n/v, hematuria, dysuria, light headedness, and HA. ED workup was significant for GI bleed and anemia. Hospital Medicine was consulted for further workup and management of the patient.           Procedure(s) (LRB):  COLONOSCOPY (N/A)  EGD (ESOPHAGOGASTRODUODENOSCOPY) (N/A)      Hospital Course:   Pt was monitored closely during his stay.  His complete blood count was monitored closely and did show some decrease with ongoing GI bleed during his stay.  He underwent EGD and colonoscopy on 08/25 with no findings of acute active bleeding, but lots of old blood was found in the colon felt to be diverticular bleed in nature.  GI followed Pt throughout his stay.  He had mild amount of bleeding after scope and CTA with GI bleed protocol was done with no evidence of active bleeding.  His bowel output improved from a bleeding perspective and his H& H did stabilize on 08/26.  He was D/C home " with repeat CBC ordered for early next week.  He was given strict return precautions regarding GI bleeding and symptomatic anemia.  Pt and wife verbalized understanding and agreement with discharge plan.    Pt was evaluated on day of discharge and deemed appropriate for discharge.       Goals of Care Treatment Preferences:  Code Status: Full Code      Consults:   Consults (From admission, onward)        Status Ordering Provider     Inpatient consult to Gastroenterology  Once        Provider:  MD David Aguilera ANNA C.          No new Assessment & Plan notes have been filed under this hospital service since the last note was generated.  Service: Hospital Medicine    Final Active Diagnoses:    Diagnosis Date Noted POA    PRINCIPAL PROBLEM:  Rectal bleeding [K62.5] 08/25/2022 Yes    Hx of gastric bypass [Z98.84] 08/25/2022 Not Applicable    GERD (gastroesophageal reflux disease) [K21.9] 08/25/2022 Yes    Diabetes mellitus 2 [E11.9] 09/10/2019 Yes    Hypertension [I10] 09/10/2019 Yes      Problems Resolved During this Admission:       Discharged Condition: good    Disposition: Home or Self Care    Follow Up:   Follow-up Information     Ina Ellsworth NP. Go to.    Specialty: Family Medicine  Why: APPOINTMENT:  September 1, 2022 at 3:45pm  Hospital Follow up  Contact information:  [address] [address] [phone]             Bin Wallace MD Follow up in 1 month(s).    Specialty: Gastroenterology  Contact information:  [address]  [address]  [facility] [address] [phone]                       Patient Instructions:      CBC W/ AUTO DIFFERENTIAL   Standing Status: Future Standing Exp. Date: 10/25/23     Diet Adult Regular     Notify your health care provider if you experience any of the following:  persistent dizziness, light-headedness, or visual disturbances     Notify your health care provider if you experience any of the following:  difficulty breathing or  "increased cough     Activity as tolerated       Significant Diagnostic Studies: Labs:   CMP   Recent Labs   Lab 08/24/22  2337 08/25/22  0414    142   K 4.1 4.0    109   CO2 25 24   * 88   BUN 14 14   CREATININE 0.8 0.7   CALCIUM 9.8 8.8   PROT 6.6 5.7*   ALBUMIN 3.6 3.2*   BILITOT 0.3 0.4   ALKPHOS 76 68   AST 13 12   ALT 23 21   ANIONGAP 11 9    and CBC   Recent Labs   Lab 08/25/22  1521 08/26/22  0028 08/26/22  0918   WBC 8.82 7.08 8.29   HGB 10.1* 9.4* 9.4*   HCT 30.9* 27.5* 27.7*    226 222       Pending Diagnostic Studies:     Procedure Component Value Units Date/Time    CBC Auto Differential [[id number]]     Order Status: Sent Lab Status: No result     Specimen: Blood     Specimen to Pathology, Surgery Gastrointestinal tract [[id number]] Collected: 08/25/22 1411    Order Status: Sent Lab Status: In process Updated: 08/25/22 1447    Specimen: Tissue          Medications:  Reconciled Home Medications:      Medication List      CONTINUE taking these medications    aspirin 81 MG Chew  Take 81 mg by mouth once daily.     BD ULTRA-FINE SHELDON PEN NEEDLE 32 gauge x 5/32" Ndle  Generic drug: pen needle, diabetic  1 pen by Misc.(Non-Drug; Combo Route) route 3 (three) times daily.     buPROPion 150 MG TB24 tablet  Commonly known as: WELLBUTRIN XL  Take 150 mg by mouth once daily.     calcium citrate 200 mg (950 mg) tablet  Commonly known as: CALCITRATE  Take 1 tablet by mouth once daily.     FREESTYLE LOREN 14 DAY SENSOR Kit  Generic drug: flash glucose sensor  USE AS DIRECTED     meclizine 50 MG tablet  Commonly known as: ANTIVERT  Take 25 mg by mouth as needed.     metFORMIN 1000 MG tablet  Commonly known as: GLUCOPHAGE  Take 1 tablet (1,000 mg total) by mouth 2 (two) times daily with meals.     metoprolol succinate 100 MG 24 hr tablet  Commonly known as: TOPROL-XL  Take 100 mg by mouth once daily. Pt takes 50mg in am, 50mg in pm.     multivitamin capsule  Take 1 capsule by mouth once daily. " bariatric     omeprazole 20 MG capsule  Commonly known as: PRILOSEC  Take 20 mg by mouth once daily.     oxyCODONE-acetaminophen  mg per tablet  Commonly known as: PERCOCET  Take 1 tablet by mouth 2 (two) times daily as needed.     PRODIGY NO CODING Strp  Generic drug: blood sugar diagnostic  1 strip by Misc.(Non-Drug; Combo Route) route as needed.     PRODIGY TWIST TOP LANCET 28 gauge Misc  Generic drug: lancets  1 lancet by Misc.(Non-Drug; Combo Route) route 3 (three) times daily as needed.     vitamin D 1000 units Tab  Commonly known as: VITAMIN D3  Take 250 Units by mouth once daily.            Indwelling Lines/Drains at time of discharge:   Lines/Drains/Airways     None                 Time spent on the discharge of patient: 36 minutes         Lauren Arias NP  Department of Hospital Medicine  [facility]

## 2022-08-26 NOTE — PLAN OF CARE
Plan of care reviewed with patient & wife. IV fluids infusing as per orders. Tolerating diet. HS bg 217, patient declined insulin.SR on telemetry. No complaints of pain . Remain free from falls/injury. Instructed to call for assistance as needed during night,verbalized understanding. Call light in reach.

## 2022-08-26 NOTE — DISCHARGE INSTRUCTIONS
Please return Monday to get your blood drawn to see how the levels are doing.    If you experience any symptoms of anemia OR recurrent GI bleeding, please seek medical attention.    Discharge Instructions, [facility] Medicine    Thank you for choosing [facility] for your medical care. The primary doctor who is taking care of you at the time of your discharge is Jason Singh MD.     You were admitted to the hospital with Rectal bleeding.     Please note your discharge instructions, including diet/activity restrictions, follow-up appointments, and medication changes.  If you have any questions about your medical issues, prescriptions, or any other questions, please feel free to contact the [facility] Medicine Dept at [phone] and we will help.    If you are previously with Home health, outpatient PT/OT or under a therapy program, you are cleared to return to those programs.    Please direct all long term medication refills and follow up to your primary care provider, Ina Ellsworth NP. Thank you again for letting us take care of your health care needs.    Please note the following discharge instructions per your discharging physician-  Lauren Arias NP

## 2022-08-26 NOTE — CARE UPDATE
08/26/22 0734   PRE-TX-O2   O2 Device (Oxygen Therapy) room air   Oxygen Concentration (%) 21   SpO2 95 %   Pulse Oximetry Type Intermittent   $ Pulse Oximetry - Multiple Charge Pulse Oximetry - Multiple   Pulse 99   Resp 15   Positioning HOB elevated 30 degrees      A-T Advancement Flap Text: The defect edges were debeveled with a #15 scalpel blade.  Given the location of the defect, shape of the defect and the proximity to free margins an A-T advancement flap was deemed most appropriate.  Using a sterile surgical marker, an appropriate advancement flap was drawn incorporating the defect and placing the expected incisions within the relaxed skin tension lines where possible.    The area thus outlined was incised deep to adipose tissue with a #15 scalpel blade.  The skin margins were undermined to an appropriate distance in all directions utilizing iris scissors.

## 2022-08-26 NOTE — PROGRESS NOTES
Discussed discharge medications and instructions with patient including: tylenol, future appointments, and avoiding NSAIDs. Patient demonstrated understanding and had no questions or concerns.

## 2022-08-26 NOTE — PLAN OF CARE
Hospital pcp and GI appt added to pts AVS.    08/26/22 1153   Discharge Assessment   Assessment Type Discharge Planning Reassessment

## 2022-08-26 NOTE — PLAN OF CARE
The pt is cleared for discharge home from case management and has follow up appointments added to his AVS.    08/26/22 6588   Final Note   Assessment Type Final Discharge Note   Anticipated Discharge Disposition Home   Hospital Resources/Appts/Education Provided Appointments scheduled and added to AVS

## 2022-08-26 NOTE — PLAN OF CARE
Plan of care reviewed with patient, verbalized understanding. 2 bowel movements post Colonoscopy w/ small amount of bright red blood noted. CTA abdomen and pelvis completed. Patient tolerating regular diet. Bed in lowest position and call light within reach.

## 2022-08-28 ENCOUNTER — HOSPITAL ENCOUNTER (OUTPATIENT)
Facility: HOSPITAL | Age: 52
Discharge: HOME | End: 2022-08-30
Attending: EMERGENCY MEDICINE | Admitting: STUDENT IN AN ORGANIZED HEALTH CARE EDUCATION/TRAINING PROGRAM
Payer: COMMERCIAL

## 2022-08-28 ENCOUNTER — NURSE TRIAGE (OUTPATIENT)
Dept: ADMINISTRATIVE | Facility: CLINIC | Age: 52
End: 2022-08-28
Payer: COMMERCIAL

## 2022-08-28 DIAGNOSIS — D62 ACUTE BLOOD LOSS ANEMIA: ICD-10-CM

## 2022-08-28 DIAGNOSIS — K62.5 RECTAL BLEEDING: ICD-10-CM

## 2022-08-28 DIAGNOSIS — K92.2 GIB (GASTROINTESTINAL BLEEDING): ICD-10-CM

## 2022-08-28 PROBLEM — D64.9 SYMPTOMATIC ANEMIA: Status: ACTIVE | Noted: 2022-08-28

## 2022-08-28 PROBLEM — E78.5 HYPERLIPIDEMIA: Status: ACTIVE | Noted: 2022-08-28

## 2022-08-28 LAB
ABO + RH BLD: NORMAL
ABO GROUP BLD: NORMAL
ALBUMIN SERPL BCP-MCNC: 3.6 G/DL (ref 3.5–5.2)
ALP SERPL-CCNC: 69 U/L (ref 55–135)
ALT SERPL W/O P-5'-P-CCNC: 24 U/L (ref 10–44)
ANION GAP SERPL CALC-SCNC: 16 MMOL/L (ref 8–16)
AST SERPL-CCNC: 20 U/L (ref 10–40)
BASOPHILS # BLD AUTO: 0.04 K/UL (ref 0–0.2)
BASOPHILS NFR BLD: 0.5 % (ref 0–1.9)
BILIRUB SERPL-MCNC: 0.4 MG/DL (ref 0.1–1)
BLD GP AB SCN CELLS X3 SERPL QL: NORMAL
BUN SERPL-MCNC: 17 MG/DL (ref 6–20)
CALCIUM SERPL-MCNC: 9.2 MG/DL (ref 8.7–10.5)
CHLORIDE SERPL-SCNC: 103 MMOL/L (ref 95–110)
CO2 SERPL-SCNC: 19 MMOL/L (ref 23–29)
CREAT SERPL-MCNC: 1.1 MG/DL (ref 0.5–1.4)
DIFFERENTIAL METHOD: ABNORMAL
EOSINOPHIL # BLD AUTO: 0.3 K/UL (ref 0–0.5)
EOSINOPHIL NFR BLD: 3.6 % (ref 0–8)
ERYTHROCYTE [DISTWIDTH] IN BLOOD BY AUTOMATED COUNT: 13.7 % (ref 11.5–14.5)
EST. GFR  (NO RACE VARIABLE): >60 ML/MIN/1.73 M^2
GLUCOSE SERPL-MCNC: 103 MG/DL (ref 70–110)
HCT VFR BLD AUTO: 28 % (ref 40–54)
HGB BLD-MCNC: 9.5 G/DL (ref 14–18)
IMM GRANULOCYTES # BLD AUTO: 0.04 K/UL (ref 0–0.04)
IMM GRANULOCYTES NFR BLD AUTO: 0.5 % (ref 0–0.5)
LYMPHOCYTES # BLD AUTO: 1.5 K/UL (ref 1–4.8)
LYMPHOCYTES NFR BLD: 17.9 % (ref 18–48)
MCH RBC QN AUTO: 29.9 PG (ref 27–31)
MCHC RBC AUTO-ENTMCNC: 33.9 G/DL (ref 32–36)
MCV RBC AUTO: 88 FL (ref 82–98)
MONOCYTES # BLD AUTO: 0.7 K/UL (ref 0.3–1)
MONOCYTES NFR BLD: 8.2 % (ref 4–15)
NEUTROPHILS # BLD AUTO: 5.9 K/UL (ref 1.8–7.7)
NEUTROPHILS NFR BLD: 69.3 % (ref 38–73)
NRBC BLD-RTO: 0 /100 WBC
PLATELET # BLD AUTO: 272 K/UL (ref 150–450)
PMV BLD AUTO: 10 FL (ref 9.2–12.9)
POTASSIUM SERPL-SCNC: 4.3 MMOL/L (ref 3.5–5.1)
PROT SERPL-MCNC: 6.5 G/DL (ref 6–8.4)
RBC # BLD AUTO: 3.18 M/UL (ref 4.6–6.2)
RH BLD: NORMAL
SODIUM SERPL-SCNC: 138 MMOL/L (ref 136–145)
WBC # BLD AUTO: 8.45 K/UL (ref 3.9–12.7)

## 2022-08-28 PROCEDURE — 86900 BLOOD TYPING SEROLOGIC ABO: CPT | Performed by: EMERGENCY MEDICINE

## 2022-08-28 PROCEDURE — 80053 COMPREHEN METABOLIC PANEL: CPT | Performed by: EMERGENCY MEDICINE

## 2022-08-28 PROCEDURE — G0378 HOSPITAL OBSERVATION PER HR: HCPCS

## 2022-08-28 PROCEDURE — 86901 BLOOD TYPING SEROLOGIC RH(D): CPT | Performed by: EMERGENCY MEDICINE

## 2022-08-28 PROCEDURE — 85025 COMPLETE CBC W/AUTO DIFF WBC: CPT | Performed by: EMERGENCY MEDICINE

## 2022-08-28 PROCEDURE — 36415 COLL VENOUS BLD VENIPUNCTURE: CPT | Performed by: EMERGENCY MEDICINE

## 2022-08-28 PROCEDURE — 99285 EMERGENCY DEPT VISIT HI MDM: CPT | Mod: 25

## 2022-08-28 PROCEDURE — 86850 RBC ANTIBODY SCREEN: CPT | Performed by: EMERGENCY MEDICINE

## 2022-08-28 RX ORDER — GLUCAGON 1 MG
1 KIT INJECTION
Status: DISCONTINUED | OUTPATIENT
Start: 2022-08-29 | End: 2022-08-30 | Stop reason: HOSPADM

## 2022-08-28 RX ORDER — CHOLECALCIFEROL (VITAMIN D3) 25 MCG
1000 TABLET ORAL DAILY
Status: DISCONTINUED | OUTPATIENT
Start: 2022-08-29 | End: 2022-08-30 | Stop reason: HOSPADM

## 2022-08-28 RX ORDER — METOPROLOL SUCCINATE 50 MG/1
100 TABLET, EXTENDED RELEASE ORAL DAILY
Status: DISCONTINUED | OUTPATIENT
Start: 2022-08-29 | End: 2022-08-30

## 2022-08-28 RX ORDER — SODIUM CHLORIDE, SODIUM LACTATE, POTASSIUM CHLORIDE, CALCIUM CHLORIDE 600; 310; 30; 20 MG/100ML; MG/100ML; MG/100ML; MG/100ML
INJECTION, SOLUTION INTRAVENOUS CONTINUOUS
Status: DISCONTINUED | OUTPATIENT
Start: 2022-08-29 | End: 2022-08-30 | Stop reason: HOSPADM

## 2022-08-28 RX ORDER — ONDANSETRON 2 MG/ML
8 INJECTION INTRAMUSCULAR; INTRAVENOUS EVERY 6 HOURS PRN
Status: DISCONTINUED | OUTPATIENT
Start: 2022-08-28 | End: 2022-08-30 | Stop reason: HOSPADM

## 2022-08-28 RX ORDER — PANTOPRAZOLE SODIUM 40 MG/10ML
40 INJECTION, POWDER, LYOPHILIZED, FOR SOLUTION INTRAVENOUS 2 TIMES DAILY
Status: DISCONTINUED | OUTPATIENT
Start: 2022-08-28 | End: 2022-08-30 | Stop reason: HOSPADM

## 2022-08-28 RX ORDER — INSULIN ASPART 100 [IU]/ML
1-10 INJECTION, SOLUTION INTRAVENOUS; SUBCUTANEOUS EVERY 6 HOURS PRN
Status: DISCONTINUED | OUTPATIENT
Start: 2022-08-29 | End: 2022-08-30 | Stop reason: HOSPADM

## 2022-08-28 RX ORDER — BUPROPION HYDROCHLORIDE 150 MG/1
150 TABLET ORAL DAILY
Status: DISCONTINUED | OUTPATIENT
Start: 2022-08-29 | End: 2022-08-30 | Stop reason: HOSPADM

## 2022-08-28 NOTE — TELEPHONE ENCOUNTER
"Reason for Disposition   Rectal bleeding, bloody stool, or tarry-black stool   SEVERE dizziness (e.g., unable to stand, requires support to walk, feels like passing out now)    Additional Information   Negative: Severe difficulty breathing (e.g., struggling for each breath, speaks in single words)   Negative: [1] Difficulty breathing or swallowing AND [2] started suddenly after medicine, an allergic food or bee sting   Negative: Shock suspected (e.g., cold/pale/clammy skin, too weak to stand)   Negative: Difficult to awaken or acting confused  (e.g., disoriented, slurred speech)   Negative: [1] Weakness (i.e., paralysis, loss of muscle strength) of the face, arm or leg on one side of the body AND [2] sudden onset AND [3] present now   Negative: [1] Numbness (i.e., loss of sensation) of the face, arm or leg on one side of the body AND [2] sudden onset AND [3] present now   Negative: [1] Loss of speech or garbled speech AND [2] sudden onset AND [3] present now   Negative: Overdose (accidental or intentional) of medications   Negative: [1] Fainted > 15 minutes ago AND [2] still feels too weak or dizzy to stand   Negative: Heart beating < 50 beats per minute OR > 140 beats per minute   Negative: Sounds like a life-threatening emergency to the triager   Negative: Chest pain   Negative: Shock suspected (e.g., cold/pale/clammy skin, too weak to stand, low BP, rapid pulse)   Negative: Difficult to awaken or acting confused  (e.g., disoriented, slurred speech)   Negative: Passed out (i.e., lost consciousness, collapsed and was not responding)   Negative: [1] Vomiting AND [2] contains red blood or black ("coffee ground") material  (Exception: few red streaks in vomit that only happened once)   Negative: Sounds like a life-threatening emergency to the triager   Negative: Diarrhea is main symptom   Negative: Stool color other than brown or tan is main concern  (no bleeding and no melena)   Negative: SEVERE rectal bleeding " (large blood clots; on and off, or constant bleeding)    Protocols used: Dizziness-A-AH, Rectal Bleeding-A-AH    Pt's caregiver Yesenia stated the pt was recently discharged from the hospital for severe rectal bleeding. Stated she was told to call back if the pt becomes dizzy. She stated today the pt has severe dizziness and almost passed out. Stated he has small blood clots in his stool. Advised to bring pt to the ED now for evaluation. Caller verbalized understanding.

## 2022-08-28 NOTE — LETTER
August 30, 2022         [address] [place] [id number]  Phone: [phone]       Patient: Jules Floyd   YOB: 1970  Date of Visit: 08/30/2022    To Whom It May Concern:    Eladia Floyd  was at [facility] on 08/28/2022- 08/30/2022. The patient may return to work on 09/06/2022 with no restrictions. If you have any questions or concerns, or if I can be of further assistance, please do not hesitate to contact me.    Sincerely,    Yashira Riojas RN

## 2022-08-29 ENCOUNTER — ANESTHESIA EVENT (OUTPATIENT)
Dept: ENDOSCOPY | Facility: HOSPITAL | Age: 52
End: 2022-08-29
Payer: COMMERCIAL

## 2022-08-29 ENCOUNTER — TELEPHONE (OUTPATIENT)
Dept: MEDSURG UNIT | Facility: HOSPITAL | Age: 52
End: 2022-08-29
Payer: COMMERCIAL

## 2022-08-29 ENCOUNTER — ANESTHESIA (OUTPATIENT)
Dept: ENDOSCOPY | Facility: HOSPITAL | Age: 52
End: 2022-08-29
Payer: COMMERCIAL

## 2022-08-29 LAB
ALBUMIN SERPL BCP-MCNC: 3.2 G/DL (ref 3.5–5.2)
ALP SERPL-CCNC: 64 U/L (ref 55–135)
ALT SERPL W/O P-5'-P-CCNC: 23 U/L (ref 10–44)
ANION GAP SERPL CALC-SCNC: 10 MMOL/L (ref 8–16)
APTT BLDCRRT: 22.5 SEC (ref 21–32)
AST SERPL-CCNC: 17 U/L (ref 10–40)
BASOPHILS # BLD AUTO: 0.04 K/UL (ref 0–0.2)
BASOPHILS NFR BLD: 0.6 % (ref 0–1.9)
BILIRUB SERPL-MCNC: 0.4 MG/DL (ref 0.1–1)
BUN SERPL-MCNC: 15 MG/DL (ref 6–20)
CALCIUM SERPL-MCNC: 9.1 MG/DL (ref 8.7–10.5)
CHLORIDE SERPL-SCNC: 104 MMOL/L (ref 95–110)
CO2 SERPL-SCNC: 27 MMOL/L (ref 23–29)
CREAT SERPL-MCNC: 0.8 MG/DL (ref 0.5–1.4)
DIFFERENTIAL METHOD: ABNORMAL
EOSINOPHIL # BLD AUTO: 0.3 K/UL (ref 0–0.5)
EOSINOPHIL NFR BLD: 5 % (ref 0–8)
ERYTHROCYTE [DISTWIDTH] IN BLOOD BY AUTOMATED COUNT: 14 % (ref 11.5–14.5)
EST. GFR  (NO RACE VARIABLE): >60 ML/MIN/1.73 M^2
ESTIMATED AVG GLUCOSE: 154 MG/DL (ref 68–131)
FERRITIN SERPL-MCNC: 105 NG/ML (ref 20–300)
FINAL PATHOLOGIC DIAGNOSIS: NORMAL
FOLATE SERPL-MCNC: 13.3 NG/ML (ref 4–24)
GLUCOSE SERPL-MCNC: 89 MG/DL (ref 70–110)
GROSS: NORMAL
HBA1C MFR BLD: 7 % (ref 4–5.6)
HCT VFR BLD AUTO: 26.2 % (ref 40–54)
HGB BLD-MCNC: 8.7 G/DL (ref 14–18)
IMM GRANULOCYTES # BLD AUTO: 0.03 K/UL (ref 0–0.04)
IMM GRANULOCYTES NFR BLD AUTO: 0.4 % (ref 0–0.5)
INR PPP: 1.1 (ref 0.8–1.2)
IRON SERPL-MCNC: 37 UG/DL (ref 45–160)
LYMPHOCYTES # BLD AUTO: 1.8 K/UL (ref 1–4.8)
LYMPHOCYTES NFR BLD: 26 % (ref 18–48)
Lab: NORMAL
MCH RBC QN AUTO: 29.6 PG (ref 27–31)
MCHC RBC AUTO-ENTMCNC: 33.2 G/DL (ref 32–36)
MCV RBC AUTO: 89 FL (ref 82–98)
MONOCYTES # BLD AUTO: 0.7 K/UL (ref 0.3–1)
MONOCYTES NFR BLD: 10.3 % (ref 4–15)
NEUTROPHILS # BLD AUTO: 3.9 K/UL (ref 1.8–7.7)
NEUTROPHILS NFR BLD: 57.7 % (ref 38–73)
NRBC BLD-RTO: 0 /100 WBC
PLATELET # BLD AUTO: 246 K/UL (ref 150–450)
PMV BLD AUTO: 9.6 FL (ref 9.2–12.9)
POTASSIUM SERPL-SCNC: 3.9 MMOL/L (ref 3.5–5.1)
PROT SERPL-MCNC: 5.7 G/DL (ref 6–8.4)
PROTHROMBIN TIME: 11.4 SEC (ref 9–12.5)
RBC # BLD AUTO: 2.94 M/UL (ref 4.6–6.2)
RETICS/RBC NFR AUTO: 3.5 % (ref 0.4–2)
SATURATED IRON: 11 % (ref 20–50)
SODIUM SERPL-SCNC: 141 MMOL/L (ref 136–145)
TOTAL IRON BINDING CAPACITY: 329 UG/DL (ref 250–450)
TRANSFERRIN SERPL-MCNC: 222 MG/DL (ref 200–375)
VIT B12 SERPL-MCNC: 1149 PG/ML (ref 210–950)
WBC # BLD AUTO: 6.81 K/UL (ref 3.9–12.7)

## 2022-08-29 PROCEDURE — D9220A PRA ANESTHESIA: Mod: ANES,,, | Performed by: ANESTHESIOLOGY

## 2022-08-29 PROCEDURE — 99214 OFFICE O/P EST MOD 30 MIN: CPT | Mod: ,,, | Performed by: INTERNAL MEDICINE

## 2022-08-29 PROCEDURE — 96361 HYDRATE IV INFUSION ADD-ON: CPT | Mod: 59

## 2022-08-29 PROCEDURE — 88305 TISSUE EXAM BY PATHOLOGIST: ICD-10-PCS | Mod: 26,,, | Performed by: PATHOLOGY

## 2022-08-29 PROCEDURE — 80053 COMPREHEN METABOLIC PANEL: CPT | Performed by: NURSE PRACTITIONER

## 2022-08-29 PROCEDURE — 84466 ASSAY OF TRANSFERRIN: CPT | Performed by: NURSE PRACTITIONER

## 2022-08-29 PROCEDURE — 44361 PR SMALL BOWEL ENDOSCOPY,BIOPSY: ICD-10-PCS | Mod: ,,, | Performed by: INTERNAL MEDICINE

## 2022-08-29 PROCEDURE — 25000003 PHARM REV CODE 250: Performed by: INTERNAL MEDICINE

## 2022-08-29 PROCEDURE — 63600175 PHARM REV CODE 636 W HCPCS: Performed by: NURSE PRACTITIONER

## 2022-08-29 PROCEDURE — 63600175 PHARM REV CODE 636 W HCPCS: Performed by: NURSE ANESTHETIST, CERTIFIED REGISTERED

## 2022-08-29 PROCEDURE — 85730 THROMBOPLASTIN TIME PARTIAL: CPT | Performed by: NURSE PRACTITIONER

## 2022-08-29 PROCEDURE — 44361 SMALL BOWEL ENDOSCOPY/BIOPSY: CPT | Performed by: INTERNAL MEDICINE

## 2022-08-29 PROCEDURE — 88305 TISSUE EXAM BY PATHOLOGIST: CPT | Performed by: PATHOLOGY

## 2022-08-29 PROCEDURE — 27201012 HC FORCEPS, HOT/COLD, DISP: Performed by: INTERNAL MEDICINE

## 2022-08-29 PROCEDURE — 25000003 PHARM REV CODE 250: Performed by: NURSE ANESTHETIST, CERTIFIED REGISTERED

## 2022-08-29 PROCEDURE — 88305 TISSUE EXAM BY PATHOLOGIST: CPT | Mod: 26,,, | Performed by: PATHOLOGY

## 2022-08-29 PROCEDURE — 36415 COLL VENOUS BLD VENIPUNCTURE: CPT | Performed by: NURSE PRACTITIONER

## 2022-08-29 PROCEDURE — 96376 TX/PRO/DX INJ SAME DRUG ADON: CPT

## 2022-08-29 PROCEDURE — 82746 ASSAY OF FOLIC ACID SERUM: CPT | Performed by: NURSE PRACTITIONER

## 2022-08-29 PROCEDURE — 85610 PROTHROMBIN TIME: CPT | Performed by: NURSE PRACTITIONER

## 2022-08-29 PROCEDURE — 85045 AUTOMATED RETICULOCYTE COUNT: CPT | Performed by: NURSE PRACTITIONER

## 2022-08-29 PROCEDURE — 82728 ASSAY OF FERRITIN: CPT | Performed by: NURSE PRACTITIONER

## 2022-08-29 PROCEDURE — G0378 HOSPITAL OBSERVATION PER HR: HCPCS

## 2022-08-29 PROCEDURE — D9220A PRA ANESTHESIA: ICD-10-PCS | Mod: CRNA,,, | Performed by: NURSE ANESTHETIST, CERTIFIED REGISTERED

## 2022-08-29 PROCEDURE — 37000008 HC ANESTHESIA 1ST 15 MINUTES: Performed by: INTERNAL MEDICINE

## 2022-08-29 PROCEDURE — 82607 VITAMIN B-12: CPT | Performed by: NURSE PRACTITIONER

## 2022-08-29 PROCEDURE — 96374 THER/PROPH/DIAG INJ IV PUSH: CPT

## 2022-08-29 PROCEDURE — 83036 HEMOGLOBIN GLYCOSYLATED A1C: CPT | Performed by: NURSE PRACTITIONER

## 2022-08-29 PROCEDURE — 85025 COMPLETE CBC W/AUTO DIFF WBC: CPT | Performed by: NURSE PRACTITIONER

## 2022-08-29 PROCEDURE — D9220A PRA ANESTHESIA: Mod: CRNA,,, | Performed by: NURSE ANESTHETIST, CERTIFIED REGISTERED

## 2022-08-29 PROCEDURE — D9220A PRA ANESTHESIA: ICD-10-PCS | Mod: ANES,,, | Performed by: ANESTHESIOLOGY

## 2022-08-29 PROCEDURE — 99214 PR OFFICE/OUTPT VISIT, EST, LEVL IV, 30-39 MIN: ICD-10-PCS | Mod: ,,, | Performed by: INTERNAL MEDICINE

## 2022-08-29 PROCEDURE — 88342 CHG IMMUNOCYTOCHEMISTRY: ICD-10-PCS | Mod: 26,,, | Performed by: PATHOLOGY

## 2022-08-29 PROCEDURE — 88342 IMHCHEM/IMCYTCHM 1ST ANTB: CPT | Mod: 26,,, | Performed by: PATHOLOGY

## 2022-08-29 PROCEDURE — C9113 INJ PANTOPRAZOLE SODIUM, VIA: HCPCS | Performed by: NURSE PRACTITIONER

## 2022-08-29 PROCEDURE — 88342 IMHCHEM/IMCYTCHM 1ST ANTB: CPT | Performed by: PATHOLOGY

## 2022-08-29 PROCEDURE — 37000009 HC ANESTHESIA EA ADD 15 MINS: Performed by: INTERNAL MEDICINE

## 2022-08-29 PROCEDURE — 44361 SMALL BOWEL ENDOSCOPY/BIOPSY: CPT | Mod: ,,, | Performed by: INTERNAL MEDICINE

## 2022-08-29 RX ORDER — LIDOCAINE HCL/PF 100 MG/5ML
SYRINGE (ML) INTRAVENOUS
Status: DISCONTINUED | OUTPATIENT
Start: 2022-08-29 | End: 2022-08-29

## 2022-08-29 RX ORDER — POLYETHYLENE GLYCOL 3350, SODIUM SULFATE ANHYDROUS, SODIUM BICARBONATE, SODIUM CHLORIDE, POTASSIUM CHLORIDE 236; 22.74; 6.74; 5.86; 2.97 G/4L; G/4L; G/4L; G/4L; G/4L
4000 POWDER, FOR SOLUTION ORAL ONCE
Status: COMPLETED | OUTPATIENT
Start: 2022-08-29 | End: 2022-08-29

## 2022-08-29 RX ORDER — PROPOFOL 10 MG/ML
VIAL (ML) INTRAVENOUS
Status: DISCONTINUED | OUTPATIENT
Start: 2022-08-29 | End: 2022-08-29

## 2022-08-29 RX ADMIN — PANTOPRAZOLE SODIUM 40 MG: 40 INJECTION, POWDER, LYOPHILIZED, FOR SOLUTION INTRAVENOUS at 08:08

## 2022-08-29 RX ADMIN — SODIUM CHLORIDE, POTASSIUM CHLORIDE, SODIUM LACTATE AND CALCIUM CHLORIDE: 600; 310; 30; 20 INJECTION, SOLUTION INTRAVENOUS at 12:08

## 2022-08-29 RX ADMIN — PANTOPRAZOLE SODIUM 40 MG: 40 INJECTION, POWDER, LYOPHILIZED, FOR SOLUTION INTRAVENOUS at 12:08

## 2022-08-29 RX ADMIN — LIDOCAINE HYDROCHLORIDE 100 MG: 20 INJECTION INTRAVENOUS at 01:08

## 2022-08-29 RX ADMIN — POLYETHYLENE GLYCOL-3350 AND ELECTROLYTES 4000 ML: 236; 6.74; 5.86; 2.97; 22.74 POWDER, FOR SOLUTION ORAL at 07:08

## 2022-08-29 RX ADMIN — PROPOFOL 50 MG: 10 INJECTION, EMULSION INTRAVENOUS at 01:08

## 2022-08-29 RX ADMIN — PROPOFOL 150 MG: 10 INJECTION, EMULSION INTRAVENOUS at 01:08

## 2022-08-29 RX ADMIN — SODIUM CHLORIDE: 0.9 INJECTION, SOLUTION INTRAVENOUS at 12:08

## 2022-08-29 RX ADMIN — PANTOPRAZOLE SODIUM 40 MG: 40 INJECTION, POWDER, LYOPHILIZED, FOR SOLUTION INTRAVENOUS at 09:08

## 2022-08-29 NOTE — SUBJECTIVE & OBJECTIVE
"Past Medical History:   Diagnosis Date    Anxiety     COVID-19     Diabetes mellitus     GERD (gastroesophageal reflux disease)     Hypertension        Past Surgical History:   Procedure Laterality Date    BACK SURGERY      COLOSTOMY  02/2019 2/2019    right knee arthroscope  1986       Review of patient's allergies indicates:   Allergen Reactions    Humalog audie kwikpen u-100  [insulin lispro] Hives, Itching, Rash and Swelling       No current facility-administered medications on file prior to encounter.     Current Outpatient Medications on File Prior to Encounter   Medication Sig    buPROPion (WELLBUTRIN XL) 150 MG TB24 tablet Take 150 mg by mouth once daily.    calcium citrate (CALCITRATE) 200 mg (950 mg) tablet Take 1 tablet by mouth once daily.    metoprolol succinate (TOPROL-XL) 100 MG 24 hr tablet Take 100 mg by mouth once daily. Pt takes 50mg in am, 50mg in pm.    multivitamin capsule Take 1 capsule by mouth once daily. bariatric    omeprazole (PRILOSEC) 20 MG capsule Take 20 mg by mouth once daily.    vitamin D (VITAMIN D3) 1000 units Tab Take 250 Units by mouth once daily.    BD ULTRA-FINE SHELDON PEN NEEDLE 32 gauge x 5/32" Ndle 1 pen by Misc.(Non-Drug; Combo Route) route 3 (three) times daily.     FREESTYLE LOREN 14 DAY SENSOR Kit USE AS DIRECTED    meclizine (ANTIVERT) 50 MG tablet Take 25 mg by mouth as needed.    metFORMIN (GLUCOPHAGE) 1000 MG tablet Take 1 tablet (1,000 mg total) by mouth 2 (two) times daily with meals.    oxyCODONE-acetaminophen (PERCOCET)  mg per tablet Take 1 tablet by mouth 2 (two) times daily as needed.    PRODIGY NO CODING Strp 1 strip by Misc.(Non-Drug; Combo Route) route as needed.     PRODIGY TWIST TOP LANCET 28 gauge Misc 1 lancet by Misc.(Non-Drug; Combo Route) route 3 (three) times daily as needed.     [DISCONTINUED] aspirin 81 MG Chew Take 81 mg by mouth once daily.     Family History       Problem Relation (Age of Onset)    Arthritis Mother    Cancer Maternal Aunt "    Diabetes Mother    Heart disease Mother    Hyperlipidemia Father    Hypertension Father          Tobacco Use    Smoking status: Never    Smokeless tobacco: Never   Substance and Sexual Activity    Alcohol use: Yes     Alcohol/week: 2.0 standard drinks     Types: 2 Cans of beer per week     Comment: occassional    Drug use: No    Sexual activity: Yes     Partners: Female     Review of Systems   Constitutional:  Negative for chills and fever.   HENT:  Negative for congestion and sore throat.    Eyes:  Negative for visual disturbance.   Respiratory:  Negative for cough and shortness of breath.    Cardiovascular:  Negative for chest pain and palpitations.   Gastrointestinal:  Positive for anal bleeding and blood in stool. Negative for abdominal pain, constipation, diarrhea, nausea and vomiting.   Endocrine: Negative for cold intolerance and heat intolerance.   Genitourinary:  Negative for dysuria and hematuria.   Musculoskeletal:  Negative for arthralgias and myalgias.   Skin:  Negative for rash.   Neurological:  Positive for syncope (near), weakness and light-headedness. Negative for tremors and seizures.   Hematological:  Negative for adenopathy. Does not bruise/bleed easily.   All other systems reviewed and are negative.  Objective:     Vital Signs (Most Recent):  Temp: 97.8 °F (36.6 °C) (08/28/22 1952)  Pulse: 78 (08/28/22 2200)  Resp: 18 (08/28/22 2200)  BP: 105/60 (08/28/22 2200)  SpO2: 97 % (08/28/22 2200) Vital Signs (24h Range):  Temp:  [97.8 °F (36.6 °C)] 97.8 °F (36.6 °C)  Pulse:  [74-81] 78  Resp:  [17-18] 18  SpO2:  [97 %-100 %] 97 %  BP: (101-113)/(56-63) 105/60     Weight: 90.7 kg (200 lb)  Body mass index is 29.53 kg/m².    Physical Exam  Vitals and nursing note reviewed.   Constitutional:       General: He is awake. He is not in acute distress.     Appearance: Normal appearance. He is well-developed and overweight.   HENT:      Head: Normocephalic and atraumatic.      Nose: Nose normal. No septal  deviation.      Mouth/Throat:      Lips: Pink.      Mouth: Mucous membranes are moist.   Eyes:      Conjunctiva/sclera: Conjunctivae normal.      Pupils: Pupils are equal, round, and reactive to light.   Neck:      Thyroid: No thyroid mass.      Vascular: No JVD.      Trachea: No tracheal tenderness or tracheal deviation.   Cardiovascular:      Rate and Rhythm: Normal rate and regular rhythm.      Heart sounds: Normal heart sounds, S1 normal and S2 normal. No murmur heard.    No friction rub. No gallop.   Pulmonary:      Effort: Pulmonary effort is normal.      Breath sounds: Normal breath sounds. No decreased breath sounds, wheezing, rhonchi or rales.   Abdominal:      General: Abdomen is protuberant. Bowel sounds are normal. There is no distension.      Palpations: Abdomen is soft. There is no hepatomegaly, splenomegaly or mass.      Tenderness: There is no abdominal tenderness.   Musculoskeletal:      Right lower leg: No edema.      Left lower leg: No edema.   Skin:     General: Skin is warm and dry.      Capillary Refill: Capillary refill takes 2 to 3 seconds.      Findings: No rash.   Neurological:      General: No focal deficit present.      Mental Status: He is alert and oriented to person, place, and time. Mental status is at baseline.      Cranial Nerves: No cranial nerve deficit.      Sensory: Sensation is intact. No sensory deficit.      Motor: Motor function is intact.   Psychiatric:         Mood and Affect: Mood normal.         Behavior: Behavior normal. Behavior is cooperative.         CRANIAL NERVES     CN III, IV, VI   Pupils are equal, round, and reactive to light.     Significant Labs: All pertinent labs within the past 24 hours have been reviewed.  CBC:   Recent Labs   Lab 08/28/22 2035   WBC 8.45   HGB 9.5*   HCT 28.0*        CMP:   Recent Labs   Lab 08/28/22 2035      K 4.3      CO2 19*      BUN 17   CREATININE 1.1   CALCIUM 9.2   PROT 6.5   ALBUMIN 3.6   BILITOT 0.4    ALKPHOS 69   AST 20   ALT 24   ANIONGAP 16

## 2022-08-29 NOTE — ASSESSMENT & PLAN NOTE
Patient's anemia is currently controlled. Has not received any PRBCs to date.. Etiology likely d/t acute blood loss  Current CBC reviewed-   Lab Results   Component Value Date    HGB 9.5 (L) 08/28/2022    HCT 28.0 (L) 08/28/2022     Monitor serial CBC and transfuse if patient becomes hemodynamically unstable, symptomatic or H/H drops below 7/21.

## 2022-08-29 NOTE — SUBJECTIVE & OBJECTIVE
Interval History: no acute events since admission.  Hgb 8.7 this morning; retics 3.5.  NPO status discussed until further recommendations from GI.    Review of Systems   Constitutional:  Negative for chills and fever.   HENT:  Negative for congestion and sore throat.    Eyes:  Negative for visual disturbance.   Respiratory:  Negative for cough and shortness of breath.    Cardiovascular:  Negative for chest pain and palpitations.   Gastrointestinal:  Positive for anal bleeding and blood in stool. Negative for abdominal pain, constipation, diarrhea, nausea and vomiting.   Endocrine: Negative for cold intolerance and heat intolerance.   Genitourinary:  Negative for dysuria and hematuria.   Musculoskeletal:  Negative for arthralgias and myalgias.   Skin:  Negative for rash.   Neurological:  Positive for syncope (near), weakness and light-headedness. Negative for tremors and seizures.   Hematological:  Negative for adenopathy. Does not bruise/bleed easily.   All other systems reviewed and are negative.  Objective:     Vital Signs (Most Recent):  Temp: 96.5 °F (35.8 °C) (08/29/22 0815)  Pulse: 73 (08/29/22 0815)  Resp: 16 (08/29/22 0815)  BP: 101/66 (08/29/22 0815)  SpO2: 96 % (08/29/22 0815) Vital Signs (24h Range):  Temp:  [96.5 °F (35.8 °C)-98.1 °F (36.7 °C)] 96.5 °F (35.8 °C)  Pulse:  [73-81] 73  Resp:  [16-18] 16  SpO2:  [93 %-100 %] 96 %  BP: (101-130)/(56-80) 101/66     Weight: 91.2 kg (201 lb 1 oz)  Body mass index is 29.69 kg/m².    Intake/Output Summary (Last 24 hours) at 8/29/2022 1102  Last data filed at 8/29/2022 0551  Gross per 24 hour   Intake 571.13 ml   Output --   Net 571.13 ml      Physical Exam  Vitals and nursing note reviewed.   Constitutional:       General: He is awake. He is not in acute distress.     Appearance: Normal appearance. He is well-developed and overweight.   HENT:      Head: Normocephalic and atraumatic.      Nose: Nose normal. No septal deviation.      Mouth/Throat:      Lips: Pink.       Mouth: Mucous membranes are moist.   Eyes:      Conjunctiva/sclera: Conjunctivae normal.      Pupils: Pupils are equal, round, and reactive to light.   Neck:      Thyroid: No thyroid mass.      Vascular: No JVD.      Trachea: No tracheal tenderness or tracheal deviation.   Cardiovascular:      Rate and Rhythm: Normal rate and regular rhythm.      Heart sounds: Normal heart sounds, S1 normal and S2 normal. No murmur heard.    No friction rub. No gallop.   Pulmonary:      Effort: Pulmonary effort is normal.      Breath sounds: Normal breath sounds. No decreased breath sounds, wheezing, rhonchi or rales.   Abdominal:      General: Abdomen is protuberant. Bowel sounds are normal. There is no distension.      Palpations: Abdomen is soft. There is no hepatomegaly, splenomegaly or mass.      Tenderness: There is no abdominal tenderness.   Musculoskeletal:      Right lower leg: No edema.      Left lower leg: No edema.   Skin:     General: Skin is warm and dry.      Capillary Refill: Capillary refill takes 2 to 3 seconds.      Findings: No rash.   Neurological:      General: No focal deficit present.      Mental Status: He is alert and oriented to person, place, and time. Mental status is at baseline.      Cranial Nerves: No cranial nerve deficit.      Sensory: Sensation is intact. No sensory deficit.      Motor: Motor function is intact.   Psychiatric:         Mood and Affect: Mood normal.         Behavior: Behavior normal. Behavior is cooperative.       Significant Labs: All pertinent labs within the past 24 hours have been reviewed.  CBC:   Recent Labs   Lab 08/28/22 2035 08/29/22  0538   WBC 8.45 6.81   HGB 9.5* 8.7*   HCT 28.0* 26.2*    246     CMP:   Recent Labs   Lab 08/28/22 2035 08/29/22  0538    141   K 4.3 3.9    104   CO2 19* 27    89   BUN 17 15   CREATININE 1.1 0.8   CALCIUM 9.2 9.1   PROT 6.5 5.7*   ALBUMIN 3.6 3.2*   BILITOT 0.4 0.4   ALKPHOS 69 64   AST 20 17   ALT 24 23    ANIONGAP 16 10       Significant Imaging: I have reviewed all pertinent imaging results/findings within the past 24 hours.

## 2022-08-29 NOTE — ANESTHESIA PREPROCEDURE EVALUATION
08/29/2022  Jules Floyd Jr. is a 51 y.o., male.      Pre-op Assessment    I have reviewed the Patient Summary Reports.     I have reviewed the Nursing Notes. I have reviewed the NPO Status.   I have reviewed the Medications.     Review of Systems  Anesthesia Hx:  No problems with previous Anesthesia    Social:  Non-Smoker    Cardiovascular:   Hypertension hyperlipidemia    Pulmonary:  Pulmonary Normal    Hepatic/GI:   GERD    Neurological:  Neurology Normal    Endocrine:   Diabetes    Psych:   anxiety          Physical Exam  General: Well nourished    Airway:  Mallampati: III / II  Neck ROM: Normal ROM    Dental:  Intact    Chest/Lungs:  Clear to auscultation, Normal Respiratory Rate    Heart:  Rate: Normal  Rhythm: Regular Rhythm        Anesthesia Plan  Type of Anesthesia, risks & benefits discussed:    Anesthesia Type: Gen Natural Airway  Intra-op Monitoring Plan: Standard ASA Monitors  Induction:  IV  Informed Consent: Informed consent signed with the Patient and all parties understand the risks and agree with anesthesia plan.  All questions answered.   ASA Score: 2  Day of Surgery Review of History & Physical: H&P Update referred to the surgeon/provider.    Ready For Surgery From Anesthesia Perspective.     .

## 2022-08-29 NOTE — ASSESSMENT & PLAN NOTE
Chronic, controlled.  Latest blood pressure and vitals reviewed-   Temp:  [97.8 °F (36.6 °C)]   Pulse:  [74-81]   Resp:  [17-18]   BP: (101-113)/(56-63)   SpO2:  [97 %-100 %] .   Home meds for hypertension were reviewed and noted below.   Hypertension Medications             metoprolol succinate (TOPROL-XL) 100 MG 24 hr tablet Take 100 mg by mouth once daily. Pt takes 50mg in am, 50mg in pm.          While in the hospital, will manage blood pressure as follows; Continue home antihypertensive regimen    Will utilize p.r.n. blood pressure medication only if patient's blood pressure greater than  180/110 and he develops symptoms such as worsening chest pain or shortness of breath.

## 2022-08-29 NOTE — H&P
"History & Physical - Short Stay  Gastroenterology      SUBJECTIVE:     Procedure: EGD with push enteroscopy    Chief Complaint/Indication for Procedure: Hematochezia, anemia    PTA Medications   Medication Sig    buPROPion (WELLBUTRIN XL) 150 MG TB24 tablet Take 150 mg by mouth once daily.    calcium citrate (CALCITRATE) 200 mg (950 mg) tablet Take 1 tablet by mouth once daily.    metoprolol succinate (TOPROL-XL) 100 MG 24 hr tablet Take 100 mg by mouth once daily. Pt takes 50mg in am, 50mg in pm.    multivitamin capsule Take 1 capsule by mouth once daily. bariatric    omeprazole (PRILOSEC) 20 MG capsule Take 20 mg by mouth once daily.    oxyCODONE-acetaminophen (PERCOCET)  mg per tablet Take 1 tablet by mouth 2 (two) times daily as needed.    vitamin D (VITAMIN D3) 1000 units Tab Take 250 Units by mouth once daily.    BD ULTRA-FINE SHELDON PEN NEEDLE 32 gauge x 5/32" Ndle 1 pen by Misc.(Non-Drug; Combo Route) route 3 (three) times daily.     FREESTYLE LOREN 14 DAY SENSOR Kit USE AS DIRECTED    meclizine (ANTIVERT) 50 MG tablet Take 25 mg by mouth as needed.    metFORMIN (GLUCOPHAGE) 1000 MG tablet Take 1 tablet (1,000 mg total) by mouth 2 (two) times daily with meals.    PRODIGY NO CODING Strp 1 strip by Misc.(Non-Drug; Combo Route) route as needed.     PRODIGY TWIST TOP LANCET 28 gauge Misc 1 lancet by Misc.(Non-Drug; Combo Route) route 3 (three) times daily as needed.        Review of patient's allergies indicates:   Allergen Reactions    Humalog audie kwikpen u-100  [insulin lispro] Hives, Itching, Rash and Swelling        Past Medical History:   Diagnosis Date    Anxiety     COVID-19     Diabetes mellitus     GERD (gastroesophageal reflux disease)     Hypertension      Past Surgical History:   Procedure Laterality Date    BACK SURGERY      COLOSTOMY  02/2019 2/2019    right knee arthroscope  1986     Family History   Problem Relation Age of Onset    Arthritis Mother     Diabetes Mother     Heart disease " Mother     Hyperlipidemia Father     Hypertension Father     Cancer Maternal Aunt      Social History     Tobacco Use    Smoking status: Never    Smokeless tobacco: Never   Substance Use Topics    Alcohol use: Yes     Alcohol/week: 2.0 standard drinks     Types: 2 Cans of beer per week     Comment: occassional    Drug use: No         OBJECTIVE:     Vital Signs (Most Recent)  Temp: 98.3 °F (36.8 °C) (08/29/22 1208)  Pulse: 77 (08/29/22 1208)  Resp: 18 (08/29/22 1208)  BP: 119/78 (08/29/22 1208)  SpO2: (!) 94 % (08/29/22 1208)    Physical Exam:                                                       GENERAL:  Comfortable, in no acute distress.                                 HEENT EXAM:  Nonicteric.  No adenopathy.  Oropharynx is clear.               NECK:  Supple.                                                               LUNGS:  Clear.                                                               CARDIAC:  Regular rate and rhythm.  S1, S2.  No murmur.                      ABDOMEN:  Soft, positive bowel sounds, nontender.  No hepatosplenomegaly or masses.  No rebound or guarding.                                             EXTREMITIES:  No edema.     MENTAL STATUS:  Normal, alert and oriented.      ASSESSMENT/PLAN:     Assessment: Hematochezia, anemia    Plan: EGD with push enteroscopy

## 2022-08-29 NOTE — HPI
Jules Floyd is a 51 year old male with a past medical history of HTN, HLD, T2DM, GERD, and s/p gastric sleeve (4/2022), who presented to the ED with weakness, near syncope and blood in stools since yesterday. He was discharged from this hospital on 8/26/22 for rectal bleeding and symptomatic anemia after being admitted for the past week. He had an EGD and colonoscopy performed with no findings of acute bleeding, but blood clots were found in the colon, concerning for a diverticular bleed. He then underwent a CTA of the abdomen with GI bleeding protocol, which also showed no active bleeding. His weakness did improve as well as his labs, and he was discharged with a hemoglobin of 9.5. He was due to have a repeat CBC tomorrow, but felt too weak to wait. He stated that when he tried standing up, he had to sit back down due to dizziness and lightheadedness. He states that he is still passing blood in his stools. He denies abdominal pain, nausea, vomiting, diarrhea or other complaint. Lab work reveals a relatively unchanged CBC, with an H/H of 9.5/28, similar to one 2 days ago which showed an H/H of 9.5/27.9. CMP appears within normal ranges. GI consulted, Dr. Cabrera notified and plans to possibly perform an EGD with push in the morning. Hospital Medicine consulted for admission and further management.

## 2022-08-29 NOTE — PROGRESS NOTES
[facility] Hospital Medicine  Progress Note    Patient Name: Jules Floyd Jr.  MRN: [id number]  Patient Class: OP- Observation   Admission Date: 8/28/2022  Length of Stay: 0 days  Attending Physician: Benny Calderon MD  Primary Care Provider: Ina Ellsworth NP        Subjective:     Principal Problem:GIB (gastrointestinal bleeding)        HPI:  Jules Floyd is a 51 year old male with a past medical history of HTN, HLD, T2DM, GERD, and s/p gastric sleeve (4/2022), who presented to the ED with weakness, near syncope and blood in stools since yesterday. He was discharged from this hospital on 8/26/22 for rectal bleeding and symptomatic anemia after being admitted for the past week. He had an EGD and colonoscopy performed with no findings of acute bleeding, but blood clots were found in the colon, concerning for a diverticular bleed. He then underwent a CTA of the abdomen with GI bleeding protocol, which also showed no active bleeding. His weakness did improve as well as his labs, and he was discharged with a hemoglobin of 9.5. He was due to have a repeat CBC tomorrow, but felt too weak to wait. He stated that when he tried standing up, he had to sit back down due to dizziness and lightheadedness. He states that he is still passing blood in his stools. He denies abdominal pain, nausea, vomiting, diarrhea or other complaint. Lab work reveals a relatively unchanged CBC, with an H/H of 9.5/28, similar to one 2 days ago which showed an H/H of 9.5/27.9. CMP appears within normal ranges. GI consulted, Dr. Cabrera notified and plans to possibly perform an EGD with push in the morning. Hospital Medicine consulted for admission and further management.       Overview/Hospital Course:  No notes on file    Interval History: no acute events since admission.  Hgb 8.7 this morning; retics 3.5.  NPO status discussed until further recommendations from GI.    Review of Systems   Constitutional:  Negative for  chills and fever.   HENT:  Negative for congestion and sore throat.    Eyes:  Negative for visual disturbance.   Respiratory:  Negative for cough and shortness of breath.    Cardiovascular:  Negative for chest pain and palpitations.   Gastrointestinal:  Positive for anal bleeding and blood in stool. Negative for abdominal pain, constipation, diarrhea, nausea and vomiting.   Endocrine: Negative for cold intolerance and heat intolerance.   Genitourinary:  Negative for dysuria and hematuria.   Musculoskeletal:  Negative for arthralgias and myalgias.   Skin:  Negative for rash.   Neurological:  Positive for syncope (near), weakness and light-headedness. Negative for tremors and seizures.   Hematological:  Negative for adenopathy. Does not bruise/bleed easily.   All other systems reviewed and are negative.  Objective:     Vital Signs (Most Recent):  Temp: 96.5 °F (35.8 °C) (08/29/22 0815)  Pulse: 73 (08/29/22 0815)  Resp: 16 (08/29/22 0815)  BP: 101/66 (08/29/22 0815)  SpO2: 96 % (08/29/22 0815) Vital Signs (24h Range):  Temp:  [96.5 °F (35.8 °C)-98.1 °F (36.7 °C)] 96.5 °F (35.8 °C)  Pulse:  [73-81] 73  Resp:  [16-18] 16  SpO2:  [93 %-100 %] 96 %  BP: (101-130)/(56-80) 101/66     Weight: 91.2 kg (201 lb 1 oz)  Body mass index is 29.69 kg/m².    Intake/Output Summary (Last 24 hours) at 8/29/2022 1102  Last data filed at 8/29/2022 0551  Gross per 24 hour   Intake 571.13 ml   Output --   Net 571.13 ml      Physical Exam  Vitals and nursing note reviewed.   Constitutional:       General: He is awake. He is not in acute distress.     Appearance: Normal appearance. He is well-developed and overweight.   HENT:      Head: Normocephalic and atraumatic.      Nose: Nose normal. No septal deviation.      Mouth/Throat:      Lips: Pink.      Mouth: Mucous membranes are moist.   Eyes:      Conjunctiva/sclera: Conjunctivae normal.      Pupils: Pupils are equal, round, and reactive to light.   Neck:      Thyroid: No thyroid mass.       Vascular: No JVD.      Trachea: No tracheal tenderness or tracheal deviation.   Cardiovascular:      Rate and Rhythm: Normal rate and regular rhythm.      Heart sounds: Normal heart sounds, S1 normal and S2 normal. No murmur heard.    No friction rub. No gallop.   Pulmonary:      Effort: Pulmonary effort is normal.      Breath sounds: Normal breath sounds. No decreased breath sounds, wheezing, rhonchi or rales.   Abdominal:      General: Abdomen is protuberant. Bowel sounds are normal. There is no distension.      Palpations: Abdomen is soft. There is no hepatomegaly, splenomegaly or mass.      Tenderness: There is no abdominal tenderness.   Musculoskeletal:      Right lower leg: No edema.      Left lower leg: No edema.   Skin:     General: Skin is warm and dry.      Capillary Refill: Capillary refill takes 2 to 3 seconds.      Findings: No rash.   Neurological:      General: No focal deficit present.      Mental Status: He is alert and oriented to person, place, and time. Mental status is at baseline.      Cranial Nerves: No cranial nerve deficit.      Sensory: Sensation is intact. No sensory deficit.      Motor: Motor function is intact.   Psychiatric:         Mood and Affect: Mood normal.         Behavior: Behavior normal. Behavior is cooperative.       Significant Labs: All pertinent labs within the past 24 hours have been reviewed.  CBC:   Recent Labs   Lab 08/28/22 2035 08/29/22  0538   WBC 8.45 6.81   HGB 9.5* 8.7*   HCT 28.0* 26.2*    246     CMP:   Recent Labs   Lab 08/28/22 2035 08/29/22  0538    141   K 4.3 3.9    104   CO2 19* 27    89   BUN 17 15   CREATININE 1.1 0.8   CALCIUM 9.2 9.1   PROT 6.5 5.7*   ALBUMIN 3.6 3.2*   BILITOT 0.4 0.4   ALKPHOS 69 64   AST 20 17   ALT 24 23   ANIONGAP 16 10       Significant Imaging: I have reviewed all pertinent imaging results/findings within the past 24 hours.      Assessment/Plan:      * GIB (gastrointestinal bleeding)  Keep patient  NPO.  Follow H/H closely. Type and screen blood and transfuse as needed.  Continue Protonix BID  Consult Gastroenterologist   Plan for possible EGD with push in AM  Check Iron, TIBC, B12 and folic acid, reticulocyte count  Continue IVF hydration.   Use IV anti-emetics as needed.           Acute blood loss anemia  Patient's anemia is currently controlled. Has not received any PRBCs to date.. Etiology likely d/t acute blood loss  Current CBC reviewed-   Lab Results   Component Value Date    HGB 8.7 (L) 08/29/2022    HCT 26.2 (L) 08/29/2022     Monitor serial CBC and transfuse if patient becomes hemodynamically unstable, symptomatic or H/H drops below 7/21.         Hyperlipidemia  Noted, not currently taking medications      GERD (gastroesophageal reflux disease)  Noted, continue protonix      Hx of gastric bypass  noted      Hypertension    Chronic, controlled.  Latest blood pressure and vitals reviewed-   Temp:  [96.5 °F (35.8 °C)-98.1 °F (36.7 °C)]   Pulse:  [73-81]   Resp:  [16-18]   BP: (101-130)/(56-80)   SpO2:  [93 %-100 %] .   Home meds for hypertension were reviewed and noted below.   Hypertension Medications             metoprolol succinate (TOPROL-XL) 100 MG 24 hr tablet Take 100 mg by mouth once daily. Pt takes 50mg in am, 50mg in pm.          While in the hospital, will manage blood pressure as follows; Continue home antihypertensive regimen    Will utilize p.r.n. blood pressure medication only if patient's blood pressure greater than  180/110 and he develops symptoms such as worsening chest pain or shortness of breath.      Diabetes mellitus 2  Patient's FSGs are controlled on current medication regimen.  Last A1c reviewed-   Lab Results   Component Value Date    HGBA1C 10.3 (H) 07/28/2020     Most recent fingerstick glucose reviewed- No results for input(s): POCTGLUCOSE in the last 24 hours.  Current correctional scale  Low  Maintain anti-hyperglycemic dose as follows-   Antihyperglycemics (From  admission, onward)    Start     Stop Route Frequency Ordered    08/29/22 0051  insulin aspart U-100 pen 1-10 Units         -- SubQ Every 6 hours PRN 08/28/22 2351        Hold Oral hypoglycemics while patient is in the hospital.      VTE Risk Mitigation (From admission, onward)         Ordered     IP VTE HIGH RISK PATIENT  Once         08/28/22 2250     Place sequential compression device  Until discontinued         08/28/22 2250                Discharge Planning   CELESTINA:      Code Status: Full Code   Is the patient medically ready for discharge?:     Reason for patient still in hospital (select all that apply): Patient trending condition and Consult recommendations  Discharge Plan A: Home with family                  JAYDA Clark  Department of Hospital Medicine   [facility]

## 2022-08-29 NOTE — ASSESSMENT & PLAN NOTE
Patient's FSGs are controlled on current medication regimen.  Last A1c reviewed-   Lab Results   Component Value Date    HGBA1C 10.3 (H) 07/28/2020     Most recent fingerstick glucose reviewed- No results for input(s): POCTGLUCOSE in the last 24 hours.  Current correctional scale  Low  Maintain anti-hyperglycemic dose as follows-   Antihyperglycemics (From admission, onward)    Start     Stop Route Frequency Ordered    08/29/22 0051  insulin aspart U-100 pen 1-10 Units         -- SubQ Every 6 hours PRN 08/28/22 2351        Hold Oral hypoglycemics while patient is in the hospital.

## 2022-08-29 NOTE — ASSESSMENT & PLAN NOTE
Keep patient NPO.  Follow H/H closely. Type and screen blood and transfuse as needed.  Continue Protonix BID  Consult Gastroenterologist   Plan for possible EGD with push in AM  Check Iron, TIBC, B12 and folic acid, reticulocyte count  Continue IVF hydration.   Use IV anti-emetics as needed.

## 2022-08-29 NOTE — ED PROVIDER NOTES
Encounter Date: 8/28/2022       History     Chief Complaint   Patient presents with    Rectal Bleeding     And feeling weak and dizzy when standing     HPI 51-year-old man presents emergency department for worsening lightheadedness yesterday and near syncopal episode today.  He was evaluated in the hospital for symptomatic anemia and GI bleed over the past week.  He underwent endoscopy and colonoscopy on 08/25 with no findings of acute active bleeding but did have blood clots in the colon felt to have a diverticular bleed.  He had a CTA with GI bleeding protocol that was done without evidence of active bleeding as well.  His symptoms improved and he was discharged after his hemoglobin stabilized at 9.5 on 8/26.  He still passing some blood in his stools but was told that that was to be expected as he has a large amount of clots.  Review of patient's allergies indicates:   Allergen Reactions    Humalog audie kwikpen u-100  [insulin lispro] Hives, Itching, Rash and Swelling     Past Medical History:   Diagnosis Date    Anxiety     COVID-19     Diabetes mellitus     GERD (gastroesophageal reflux disease)     Hypertension      Past Surgical History:   Procedure Laterality Date    BACK SURGERY      COLOSTOMY  02/2019 2/2019    right knee arthroscope  1986     Family History   Problem Relation Age of Onset    Arthritis Mother     Diabetes Mother     Heart disease Mother     Hyperlipidemia Father     Hypertension Father     Cancer Maternal Aunt      Social History     Tobacco Use    Smoking status: Never    Smokeless tobacco: Never   Substance Use Topics    Alcohol use: Yes     Alcohol/week: 2.0 standard drinks     Types: 2 Cans of beer per week     Comment: occassional    Drug use: No     Review of Systems   Constitutional:  Negative for fever.   HENT:  Negative for sore throat.    Respiratory:  Negative for shortness of breath.    Cardiovascular:  Negative for chest pain.   Gastrointestinal:  Positive for blood in  stool. Negative for nausea.   Genitourinary:  Negative for dysuria.   Musculoskeletal:  Negative for back pain.   Skin:  Negative for rash.   Neurological:  Positive for light-headedness. Negative for weakness.   Hematological:  Does not bruise/bleed easily.     Physical Exam     Initial Vitals [08/28/22 1952]   BP Pulse Resp Temp SpO2   (!) 106/58 74 18 97.8 °F (36.6 °C) 98 %      MAP       --         Physical Exam    Constitutional: Vital signs are normal. He appears well-developed and well-nourished.  Non-toxic appearance. No distress.   HENT:   Head: Normocephalic and atraumatic.   Eyes: EOM are normal. Pupils are equal, round, and reactive to light.   Neck: Neck supple. No JVD present.   Normal range of motion.  Cardiovascular:  Normal rate, regular rhythm, normal heart sounds and intact distal pulses.     Exam reveals no gallop and no friction rub.       No murmur heard.  Pulmonary/Chest: Breath sounds normal. He has no wheezes. He has no rhonchi. He has no rales.   Abdominal: Abdomen is soft. Bowel sounds are normal. There is no abdominal tenderness. There is no rebound and no guarding.   Musculoskeletal:         General: Normal range of motion.      Cervical back: Normal range of motion and neck supple. No rigidity.     Neurological: He is alert and oriented to person, place, and time. He has normal strength and normal reflexes. No cranial nerve deficit or sensory deficit. He exhibits normal muscle tone. Coordination normal. GCS eye subscore is 4. GCS verbal subscore is 5. GCS motor subscore is 6.   Skin: Skin is warm and dry.   Psychiatric: He has a normal mood and affect. His speech is normal and behavior is normal. He is not actively hallucinating.       ED Course   Procedures  Labs Reviewed   CBC W/ AUTO DIFFERENTIAL - Abnormal; Notable for the following components:       Result Value    RBC 3.18 (*)     Hemoglobin 9.5 (*)     Hematocrit 28.0 (*)     Lymph % 17.9 (*)     All other components within  normal limits   COMPREHENSIVE METABOLIC PANEL - Abnormal; Notable for the following components:    CO2 19 (*)     All other components within normal limits   APTT   PROTIME-INR   RETICULOCYTES   VITAMIN B12   IRON AND TIBC   FERRITIN   FOLATE   TYPE & SCREEN   GROUP & RH          Imaging Results    None          Medications   buPROPion TB24 tablet 150 mg (has no administration in time range)   metoprolol succinate (TOPROL-XL) 24 hr tablet 100 mg (has no administration in time range)   multivitamin tablet (has no administration in time range)   vitamin D 1000 units tablet 1,000 Units (has no administration in time range)   lactated ringers infusion ( Intravenous New Bag 8/29/22 0004)   pantoprazole injection 40 mg (40 mg Intravenous Given 8/29/22 0000)   ondansetron injection 8 mg (has no administration in time range)   glucagon (human recombinant) injection 1 mg (has no administration in time range)   dextrose 10% bolus 125 mL (has no administration in time range)   dextrose 10% bolus 250 mL (has no administration in time range)   insulin aspart U-100 pen 1-10 Units (has no administration in time range)     Medical Decision Making:   History:   Old Medical Records: I decided to obtain old medical records.  Initial Assessment:   Patient is a 51-year-old man presents emergency department for evaluation of near syncopal episode today.  He reports continued passing of blood in his stools.  Denies abdominal pain or fever.  Orthostatics were performed are negative.  Hemoglobin is stable at 9.5 however, in the setting of ongoing GI bleeding he may be having an active bleed with concomitant hematopoiesis.  Discussed with Dr. Cabrera who suggest observation and further evaluation in the morning.  Discussed Hospital Medicine agrees to admit the patient.                    Clinical Impression:   Final diagnoses:  [K92.2] GIB (gastrointestinal bleeding)  [K62.5] Rectal bleeding (Primary)  [D62] Acute blood loss anemia      ED  Disposition Condition    Observation                 Capo Hernandez MD  08/29/22 0032

## 2022-08-29 NOTE — ED TRIAGE NOTES
Jules Floyd Jr. is here with slight rectal bleeding but having dizziness and nearly passing out when standing.

## 2022-08-29 NOTE — H&P
[facility] Hospital Medicine  History & Physical    Patient Name: Jules Floyd Jr.  MRN: [id number]  Patient Class: OP- Observation  Admission Date: 8/28/2022  Attending Physician: Benny Calderon MD   Primary Care Provider: Ina Ellsworth NP         Patient information was obtained from patient and ER records.     Subjective:     Principal Problem:GIB (gastrointestinal bleeding)    Chief Complaint:   Chief Complaint   Patient presents with    Rectal Bleeding     And feeling weak and dizzy when standing        HPI: Jules Floyd is a 51 year old male with a past medical history of HTN, HLD, T2DM, GERD, and s/p gastric sleeve (4/2022), who presented to the ED with weakness, near syncope and blood in stools since yesterday. He was discharged from this hospital on 8/26/22 for rectal bleeding and symptomatic anemia after being admitted for the past week. He had an EGD and colonoscopy performed with no findings of acute bleeding, but blood clots were found in the colon, concerning for a diverticular bleed. He then underwent a CTA of the abdomen with GI bleeding protocol, which also showed no active bleeding. His weakness did improve as well as his labs, and he was discharged with a hemoglobin of 9.5. He was due to have a repeat CBC tomorrow, but felt too weak to wait. He stated that when he tried standing up, he had to sit back down due to dizziness and lightheadedness. He states that he is still passing blood in his stools. He denies abdominal pain, nausea, vomiting, diarrhea or other complaint. Lab work reveals a relatively unchanged CBC, with an H/H of 9.5/28, similar to one 2 days ago which showed an H/H of 9.5/27.9. CMP appears within normal ranges. GI consulted, Dr. Cabrera notified and plans to possibly perform an EGD with push in the morning. Hospital Medicine consulted for admission and further management.       Past Medical History:   Diagnosis Date    Anxiety     COVID-19      "Diabetes mellitus     GERD (gastroesophageal reflux disease)     Hypertension        Past Surgical History:   Procedure Laterality Date    BACK SURGERY      COLOSTOMY  02/2019 2/2019    right knee arthroscope  1986       Review of patient's allergies indicates:   Allergen Reactions    Humalog audie kwikpen u-100  [insulin lispro] Hives, Itching, Rash and Swelling       No current facility-administered medications on file prior to encounter.     Current Outpatient Medications on File Prior to Encounter   Medication Sig    buPROPion (WELLBUTRIN XL) 150 MG TB24 tablet Take 150 mg by mouth once daily.    calcium citrate (CALCITRATE) 200 mg (950 mg) tablet Take 1 tablet by mouth once daily.    metoprolol succinate (TOPROL-XL) 100 MG 24 hr tablet Take 100 mg by mouth once daily. Pt takes 50mg in am, 50mg in pm.    multivitamin capsule Take 1 capsule by mouth once daily. bariatric    omeprazole (PRILOSEC) 20 MG capsule Take 20 mg by mouth once daily.    vitamin D (VITAMIN D3) 1000 units Tab Take 250 Units by mouth once daily.    BD ULTRA-FINE SHELDON PEN NEEDLE 32 gauge x 5/32" Ndle 1 pen by Misc.(Non-Drug; Combo Route) route 3 (three) times daily.     [employer] LOREN 14 DAY SENSOR Kit USE AS DIRECTED    meclizine (ANTIVERT) 50 MG tablet Take 25 mg by mouth as needed.    metFORMIN (GLUCOPHAGE) 1000 MG tablet Take 1 tablet (1,000 mg total) by mouth 2 (two) times daily with meals.    oxyCODONE-acetaminophen (PERCOCET)  mg per tablet Take 1 tablet by mouth 2 (two) times daily as needed.    PRODIGY NO CODING Strp 1 strip by Misc.(Non-Drug; Combo Route) route as needed.     PRODIGY TWIST TOP LANCET 28 gauge Misc 1 lancet by Misc.(Non-Drug; Combo Route) route 3 (three) times daily as needed.     [DISCONTINUED] aspirin 81 MG Chew Take 81 mg by mouth once daily.     Family History       Problem Relation (Age of Onset)    Arthritis Mother    Cancer Maternal Aunt    Diabetes Mother    Heart disease Mother    " Hyperlipidemia Father    Hypertension Father          Tobacco Use    Smoking status: Never    Smokeless tobacco: Never   Substance and Sexual Activity    Alcohol use: Yes     Alcohol/week: 2.0 standard drinks     Types: 2 Cans of beer per week     Comment: occassional    Drug use: No    Sexual activity: Yes     Partners: Female     Review of Systems   Constitutional:  Negative for chills and fever.   HENT:  Negative for congestion and sore throat.    Eyes:  Negative for visual disturbance.   Respiratory:  Negative for cough and shortness of breath.    Cardiovascular:  Negative for chest pain and palpitations.   Gastrointestinal:  Positive for anal bleeding and blood in stool. Negative for abdominal pain, constipation, diarrhea, nausea and vomiting.   Endocrine: Negative for cold intolerance and heat intolerance.   Genitourinary:  Negative for dysuria and hematuria.   Musculoskeletal:  Negative for arthralgias and myalgias.   Skin:  Negative for rash.   Neurological:  Positive for syncope (near), weakness and light-headedness. Negative for tremors and seizures.   Hematological:  Negative for adenopathy. Does not bruise/bleed easily.   All other systems reviewed and are negative.  Objective:     Vital Signs (Most Recent):  Temp: 97.8 °F (36.6 °C) (08/28/22 1952)  Pulse: 78 (08/28/22 2200)  Resp: 18 (08/28/22 2200)  BP: 105/60 (08/28/22 2200)  SpO2: 97 % (08/28/22 2200) Vital Signs (24h Range):  Temp:  [97.8 °F (36.6 °C)] 97.8 °F (36.6 °C)  Pulse:  [74-81] 78  Resp:  [17-18] 18  SpO2:  [97 %-100 %] 97 %  BP: (101-113)/(56-63) 105/60     Weight: 90.7 kg (200 lb)  Body mass index is 29.53 kg/m².    Physical Exam  Vitals and nursing note reviewed.   Constitutional:       General: He is awake. He is not in acute distress.     Appearance: Normal appearance. He is well-developed and overweight.   HENT:      Head: Normocephalic and atraumatic.      Nose: Nose normal. No septal deviation.      Mouth/Throat:      Lips:  Pink.      Mouth: Mucous membranes are moist.   Eyes:      Conjunctiva/sclera: Conjunctivae normal.      Pupils: Pupils are equal, round, and reactive to light.   Neck:      Thyroid: No thyroid mass.      Vascular: No JVD.      Trachea: No tracheal tenderness or tracheal deviation.   Cardiovascular:      Rate and Rhythm: Normal rate and regular rhythm.      Heart sounds: Normal heart sounds, S1 normal and S2 normal. No murmur heard.    No friction rub. No gallop.   Pulmonary:      Effort: Pulmonary effort is normal.      Breath sounds: Normal breath sounds. No decreased breath sounds, wheezing, rhonchi or rales.   Abdominal:      General: Abdomen is protuberant. Bowel sounds are normal. There is no distension.      Palpations: Abdomen is soft. There is no hepatomegaly, splenomegaly or mass.      Tenderness: There is no abdominal tenderness.   Musculoskeletal:      Right lower leg: No edema.      Left lower leg: No edema.   Skin:     General: Skin is warm and dry.      Capillary Refill: Capillary refill takes 2 to 3 seconds.      Findings: No rash.   Neurological:      General: No focal deficit present.      Mental Status: He is alert and oriented to person, place, and time. Mental status is at baseline.      Cranial Nerves: No cranial nerve deficit.      Sensory: Sensation is intact. No sensory deficit.      Motor: Motor function is intact.   Psychiatric:         Mood and Affect: Mood normal.         Behavior: Behavior normal. Behavior is cooperative.         CRANIAL NERVES     CN III, IV, VI   Pupils are equal, round, and reactive to light.     Significant Labs: All pertinent labs within the past 24 hours have been reviewed.  CBC:   Recent Labs   Lab 08/28/22 2035   WBC 8.45   HGB 9.5*   HCT 28.0*        CMP:   Recent Labs   Lab 08/28/22 2035      K 4.3      CO2 19*      BUN 17   CREATININE 1.1   CALCIUM 9.2   PROT 6.5   ALBUMIN 3.6   BILITOT 0.4   ALKPHOS 69   AST 20   ALT 24   ANIONGAP  16           Assessment/Plan:     * GIB (gastrointestinal bleeding)  Keep patient NPO.  Follow H/H closely. Type and screen blood and transfuse as needed.  Continue Protonix BID  Consult GastroenterologistIzabel   Plan for possible EGD with push in AM  Check Iron, TIBC, B12 and folic acid, reticulocyte count  Continue IVF hydration.   Use IV anti-emetics as needed.           Acute blood loss anemia  Patient's anemia is currently controlled. Has not received any PRBCs to date.. Etiology likely d/t acute blood loss  Current CBC reviewed-   Lab Results   Component Value Date    HGB 9.5 (L) 08/28/2022    HCT 28.0 (L) 08/28/2022     Monitor serial CBC and transfuse if patient becomes hemodynamically unstable, symptomatic or H/H drops below 7/21.         Hyperlipidemia  Noted, not currently taking medications      GERD (gastroesophageal reflux disease)  Noted, continue protonix      Hx of gastric bypass  noted      Hypertension    Chronic, controlled.  Latest blood pressure and vitals reviewed-   Temp:  [97.8 °F (36.6 °C)]   Pulse:  [74-81]   Resp:  [17-18]   BP: (101-113)/(56-63)   SpO2:  [97 %-100 %] .   Home meds for hypertension were reviewed and noted below.   Hypertension Medications             metoprolol succinate (TOPROL-XL) 100 MG 24 hr tablet Take 100 mg by mouth once daily. Pt takes 50mg in am, 50mg in pm.          While in the hospital, will manage blood pressure as follows; Continue home antihypertensive regimen    Will utilize p.r.n. blood pressure medication only if patient's blood pressure greater than  180/110 and he develops symptoms such as worsening chest pain or shortness of breath.      Diabetes mellitus 2  Patient's FSGs are controlled on current medication regimen.  Last A1c reviewed-   Lab Results   Component Value Date    HGBA1C 10.3 (H) 07/28/2020     Most recent fingerstick glucose reviewed- No results for input(s): POCTGLUCOSE in the last 24 hours.  Current correctional scale  Low  Maintain  anti-hyperglycemic dose as follows-   Antihyperglycemics (From admission, onward)    Start     Stop Route Frequency Ordered    08/29/22 0051  insulin aspart U-100 pen 1-10 Units         -- SubQ Every 6 hours PRN 08/28/22 2351        Hold Oral hypoglycemics while patient is in the hospital.      VTE Risk Mitigation (From admission, onward)         Ordered     IP VTE HIGH RISK PATIENT  Once         08/28/22 2250     Place sequential compression device  Until discontinued         08/28/22 2250                   JAYDA Arias  Department of Hospital Medicine   [facility]

## 2022-08-29 NOTE — ASSESSMENT & PLAN NOTE
Keep patient NPO.  Follow H/H closely. Type and screen blood and transfuse as needed.  Continue Protonix BID  Consult Gastroenterologist-Rick   Plan for possible EGD with push in AM  Check Iron, TIBC, B12 and folic acid, reticulocyte count  Continue IVF hydration.   Use IV anti-emetics as needed.

## 2022-08-29 NOTE — TRANSFER OF CARE
"Anesthesia Transfer of Care Note    Patient: Jules Floyd Jr.    Procedure(s) Performed: Procedure(s) (LRB):  EGD (ESOPHAGOGASTRODUODENOSCOPY)(with push) (N/A)    Patient location: GI    Anesthesia Type: general    Transport from OR: Transported from OR on room air with adequate spontaneous ventilation    Post pain: adequate analgesia    Post assessment: no apparent anesthetic complications    Post vital signs: stable    Level of consciousness: awake    Nausea/Vomiting: no nausea/vomiting    Complications: none    Transfer of care protocol was followed      Last vitals:   Visit Vitals  /78 (BP Location: Left arm, Patient Position: Lying)   Pulse 77   Temp 36.8 °C (98.3 °F) (Skin)   Resp 18   Ht 5' 9" (1.753 m)   Wt 91.2 kg (201 lb 1 oz)   SpO2 (!) 94%   BMI 29.69 kg/m²     "

## 2022-08-29 NOTE — PLAN OF CARE
POC discussed with pt, pt verbalized understanding. Oriented x4. PIV cdi, fluids infusing. NPO per orders. Pt ambulated to the restroom, complains of dizziness with standing and walking. No bowel movement during the shift. Denies pain, nausea, and SOB. NSR on tele. SCD's on. Call light in reach, bed alarm set, safety maintained. No complaints or requests at this time, will continue to monitor.

## 2022-08-29 NOTE — PLAN OF CARE
[facility]  Initial Discharge Assessment       Primary Care Provider: Ina Ellsworth NP    Admission Diagnosis: Acute blood loss anemia [D62]  Rectal bleeding [K62.5]  GIB (gastrointestinal bleeding) [K92.2]    Admission Date: 8/28/2022  Expected Discharge Date: PT confirmed home address and lives with S/O Yesenia [phone]. Pt recently admitted 8/24-8/26/22. Pt denied HH and has a home glucometer. Pts PCP is Dr. Ellsworth and he has [employer] insurance. Pt reports that he does drive but Yesenia will provide transport home. CM following.     Discharge Barriers Identified: None    Payor: [employer] / Plan: [employer] CHOICE POS / Product Type: Commercial /     Extended Emergency Contact Information  Primary Emergency Contact: FOX HERNANDEZ  Mobile Phone: [phone]  Relation: Sister  Preferred language: English   needed? No    Discharge Plan A: Home with family  Discharge Plan B: Home      [facility] - [facility] [id number]  [address] [facility] [address]  [place] [address]  Phone: [phone] Fax: [phone]      Initial Assessment (most recent)       Adult Discharge Assessment - 08/29/22 0943          Discharge Assessment    Assessment Type Discharge Planning Assessment     Confirmed/corrected address, phone number and insurance Yes     Confirmed Demographics Correct on Facesheet     Source of Information patient     Communicated CELESTINA with patient/caregiver Yes     Reason For Admission Acute blood loss     Lives With significant other     Facility Arrived From: home     Do you expect to return to your current living situation? Yes     Do you have help at home or someone to help you manage your care at home? Yes     Who are your caregiver(s) and their phone number(s)? S/O Yesenia [phone]     Prior to hospitilization cognitive status: Alert/Oriented     Current cognitive status: Alert/Oriented     Walking or Climbing Stairs Difficulty none     Dressing/Bathing Difficulty  none     Home Layout Able to live on 1st floor     Equipment Currently Used at Home glucometer     Readmission within 30 days? Yes     Patient currently being followed by outpatient case management? No     Do you currently have service(s) that help you manage your care at home? No     Do you take prescription medications? Yes     Do you have prescription coverage? Yes     Do you have any problems affording any of your prescribed medications? No     Is the patient taking medications as prescribed? yes     Who is going to help you get home at discharge? S/O Yesenia [phone]     How do you get to doctors appointments? car, drives self     Are you on dialysis? No     Do you take coumadin? No     Discharge Plan A Home with family     Discharge Plan B Home     DME Needed Upon Discharge  none     Discharge Plan discussed with: Patient     Discharge Barriers Identified None        Relationship/Environment    Name(s) of Who Lives With Patient S/O Yesenia [phone]

## 2022-08-29 NOTE — CONSULTS
CC: Hematochezia     HPI 51 y.o. male with a past medical history of HTN, HLD, T2DM, GERD, and s/p gastric sleeve (4/2022), who presented to the ED with weakness, near syncope and blood in stools since yesterday. He was discharged from this hospital on 8/26/22 for rectal bleeding and symptomatic anemia after being admitted for the past week. He had an EGD and colonoscopy performed with no findings of acute bleeding, but blood clots were found in the colon, concerning for a diverticular bleed. He then underwent a CTA of the abdomen with GI bleeding protocol, which also showed no active bleeding. His weakness did improve as well as his labs, and he was discharged with a hemoglobin of 9.5. He was due to have a repeat CBC tomorrow, but felt too weak to wait. He stated that when he tried standing up, he had to sit back down due to dizziness and lightheadedness. He states that he is still passing blood in his stools. He denies abdominal pain, nausea, vomiting, diarrhea or other complaint. Lab work reveals a relatively unchanged CBC, with an H/H of 9.5/28, similar to one 2 days ago which showed an H/H of 9.5/27.9. CMP appears within normal ranges.     GI consulted for recurrent hematochezia, acute in nature associated with dizziness, lightheadedness. Recently admitted to the hospital on 8/25 with similar presentation and discharged 8/26. At that time EGD/colonoscopy done. No active bleeding at the time of scope but copious old blood and clot evacuated from the colon.  Diverticulosis noted which was the likely source of bleeding. CTA at that time showed no evidence of contrast extravasation or concern for active GI bleeding.     Today states continues to have dark maroon colored blood prior to admission. Medical records reviewed. Additional history supplemented by nursing.     Past Medical History:   Diagnosis Date    Anxiety     COVID-19     Diabetes mellitus     GERD (gastroesophageal reflux disease)     Hypertension   "    Past Surgical History:   Procedure Laterality Date    BACK SURGERY      COLOSTOMY  02/2019 2/2019    right knee arthroscope  1986     Social History  Social History     Tobacco Use    Smoking status: Never    Smokeless tobacco: Never   Substance Use Topics    Alcohol use: Yes     Alcohol/week: 2.0 standard drinks     Types: 2 Cans of beer per week     Comment: occassional    Drug use: No     Family History   Problem Relation Age of Onset    Arthritis Mother     Diabetes Mother     Heart disease Mother     Hyperlipidemia Father     Hypertension Father     Cancer Maternal Aunt      Review of Systems  General ROS: +dizziness, +lightheadedness, negative for chills, fever or weight loss  Psychological ROS: negative for hallucination, depression or suicidal ideation  Ophthalmic ROS: negative for blurry vision, photophobia or eye pain  ENT ROS: negative for epistaxis, sore throat or rhinorrhea  Respiratory ROS: no cough, shortness of breath, or wheezing  Cardiovascular ROS: no chest pain or dyspnea on exertion  Gastrointestinal ROS: no abdominal pain, change in bowel habits, +bloody stools  Genito-Urinary ROS: no dysuria, trouble voiding, or hematuria  Musculoskeletal ROS: negative for gait disturbance or muscular weakness  Neurological ROS: no syncope or seizures; no ataxia  Dermatological ROS: negative for pruritis, rash and jaundice    Physical Examination  /66 (BP Location: Right arm, Patient Position: Lying)   Pulse 73   Temp 96.5 °F (35.8 °C) (Oral)   Resp 16   Ht 5' 9" (1.753 m)   Wt 91.2 kg (201 lb 1 oz)   SpO2 96%   BMI 29.69 kg/m²   General appearance: alert, cooperative, no distress  HENT: Normocephalic, atraumatic, neck symmetrical, no nasal discharge   Eyes: conjunctivae/corneas clear, PERRL, EOM's intact  Lungs: clear to auscultation bilaterally, symmetric chest wall expansion bilaterally  Heart: regular rate and rhythm without rub; no displacement of the PMI   Abdomen: soft, non-tender; " bowel sounds normoactive; no organomegaly  Extremities: extremities symmetric; no clubbing, cyanosis, or edema  Integument: Skin color, texture, turgor normal; no rashes; hair distrubution normal  Neurologic: Alert and oriented X 3, did not test gait  Psychiatric: no pressured speech; normal affect; no evidence of impaired cognition     Labs:  Lab Results   Component Value Date    WBC 6.81 08/29/2022    HGB 8.7 (L) 08/29/2022    HCT 26.2 (L) 08/29/2022    MCV 89 08/29/2022     08/29/2022       CMP  Sodium   Date Value Ref Range Status   08/29/2022 141 136 - 145 mmol/L Final   06/18/2017 140 134 - 144 mmol/L      Potassium   Date Value Ref Range Status   08/29/2022 3.9 3.5 - 5.1 mmol/L Final     Chloride   Date Value Ref Range Status   08/29/2022 104 95 - 110 mmol/L Final   06/18/2017 107 98 - 110 mmol/L      CO2   Date Value Ref Range Status   08/29/2022 27 23 - 29 mmol/L Final     Glucose   Date Value Ref Range Status   08/29/2022 89 70 - 110 mg/dL Final   06/18/2017 161 (H) 70 - 99 mg/dL      BUN   Date Value Ref Range Status   08/29/2022 15 6 - 20 mg/dL Final     Creatinine   Date Value Ref Range Status   08/29/2022 0.8 0.5 - 1.4 mg/dL Final   06/18/2017 0.86 0.60 - 1.40 mg/dL    01/13/2013 1.5 (H) 0.5 - 1.4 mg/dL Final     Calcium   Date Value Ref Range Status   08/29/2022 9.1 8.7 - 10.5 mg/dL Final   01/13/2013 9.4 8.7 - 10.5 mg/dL Final     Total Protein   Date Value Ref Range Status   08/29/2022 5.7 (L) 6.0 - 8.4 g/dL Final     Albumin   Date Value Ref Range Status   08/29/2022 3.2 (L) 3.5 - 5.2 g/dL Final     Total Bilirubin   Date Value Ref Range Status   08/29/2022 0.4 0.1 - 1.0 mg/dL Final     Comment:     For infants and newborns, interpretation of results should be based  on gestational age, weight and in agreement with clinical  observations.    Premature Infant recommended reference ranges:  Up to 24 hours.............<8.0 mg/dL  Up to 48 hours............<12.0 mg/dL  3-5  days..................<15.0 mg/dL  6-29 days.................<15.0 mg/dL       Alkaline Phosphatase   Date Value Ref Range Status   08/29/2022 64 55 - 135 U/L Final   01/13/2013 63 55 - 135 U/L Final     AST   Date Value Ref Range Status   08/29/2022 17 10 - 40 U/L Final   01/13/2013 11 10 - 40 U/L Final     ALT   Date Value Ref Range Status   08/29/2022 23 10 - 44 U/L Final     Anion Gap   Date Value Ref Range Status   08/29/2022 10 8 - 16 mmol/L Final   01/13/2013 14 5 - 15 meq/L Final     eGFR if    Date Value Ref Range Status   02/24/2021 >60.0 >60 mL/min/1.73 m^2 Final     eGFR if non    Date Value Ref Range Status   02/24/2021 >60.0 >60 mL/min/1.73 m^2 Final     Comment:     Calculation used to obtain the estimated glomerular filtration  rate (eGFR) is the CKD-EPI equation.          Imaging:  CTA:  Impression:     1. No evidence of contrast extravasation to suggest active gastrointestinal hemorrhage.  2. Postop changes of sleeve gastrectomy.  Small hiatal hernia.  3. Colonic diverticulosis without evidence of acute diverticulitis.  4. Punctate nonobstructing left renal calculus.  No hydronephrosis.     Independently reviewed    Assessment:   1.  Hematochezia  2.  Anemia  3.  NSAID use  4.  History of gastric sleeve  5. Suspected diverticular bleeding      Plan:  -Clear liquid diet  -EGD with push enteroscopy today without acute abnormality  -Plan colonoscopy tomorrow  -Golytely to start this evening  -Recommendations to follow endoscopy       Hayder Macias MD  [facility]  [address] [facility] [place] [address]  Office: [phone]  Fax: [phone]

## 2022-08-29 NOTE — ASSESSMENT & PLAN NOTE
Chronic, controlled.  Latest blood pressure and vitals reviewed-   Temp:  [96.5 °F (35.8 °C)-98.1 °F (36.7 °C)]   Pulse:  [73-81]   Resp:  [16-18]   BP: (101-130)/(56-80)   SpO2:  [93 %-100 %] .   Home meds for hypertension were reviewed and noted below.   Hypertension Medications             metoprolol succinate (TOPROL-XL) 100 MG 24 hr tablet Take 100 mg by mouth once daily. Pt takes 50mg in am, 50mg in pm.          While in the hospital, will manage blood pressure as follows; Continue home antihypertensive regimen    Will utilize p.r.n. blood pressure medication only if patient's blood pressure greater than  180/110 and he develops symptoms such as worsening chest pain or shortness of breath.

## 2022-08-29 NOTE — PROVATION PATIENT INSTRUCTIONS
Discharge Summary/Instructions after an Endoscopic Procedure  Patient Name: Jules Floyd  Patient MRN: [id number]  Patient YOB: 1970  Monday, August 29, 2022  Hayder Macias MD  Dear patient,  As a result of recent federal legislation (The Federal Cures Act), you may   receive lab or pathology results from your procedure in your [facility]   account before your physician is able to contact you. Your physician or   their representative will relay the results to you with their   recommendations at their soonest availability.  Thank you,  RESTRICTIONS:  During your procedure today, you received medications for sedation.  These   medications may affect your judgment, balance and coordination.  Therefore,   for 24 hours, you have the following restrictions:   - DO NOT drive a car, operate machinery, make legal/financial decisions,   sign important papers or drink alcohol.    ACTIVITY:  Today: no heavy lifting, straining or running due to procedural   sedation/anesthesia.  The following day: return to full activity including work.  DIET:  Eat and drink normally unless instructed otherwise.     TREATMENT FOR COMMON SIDE EFFECTS:  - Mild abdominal pain, nausea, belching, bloating or excessive gas:  rest,   eat lightly and use a heating pad.  - Sore Throat: treat with throat lozenges and/or gargle with warm salt   water.  - Because air was used during the procedure, expelling large amounts of air   from your rectum or belching is normal.  - If a bowel prep was taken, you may not have a bowel movement for 1-3 days.    This is normal.  SYMPTOMS TO WATCH FOR AND REPORT TO YOUR PHYSICIAN:  1. Abdominal pain or bloating, other than gas cramps.  2. Chest pain.  3. Back pain.  4. Signs of infection such as: chills or fever occurring within 24 hours   after the procedure.  5. Rectal bleeding, which would show as bright red, maroon, or black stools.   (A tablespoon of blood from the rectum is not serious, especially if    hemorrhoids are present.)  6. Vomiting.  7. Weakness or dizziness.  GO DIRECTLY TO THE NEAREST EMERGENCY ROOM IF YOU HAVE ANY OF THE FOLLOWING:      Difficulty breathing              Chills and/or fever over 101 F   Persistent vomiting and/or vomiting blood   Severe abdominal pain   Severe chest pain   Black, tarry stools   Bleeding- more than one tablespoon   Any other symptom or condition that you feel may need urgent attention  Your doctor recommends these additional instructions:  If any biopsies were taken, your doctors clinic will contact you in 1 to 2   weeks with any results.  - [facility] to [facility] for [facility] care.   - Clear liquid diet.   - Colonoscopy tomorrow  For questions, problems or results please call your physician - Hayder Macias MD at Work:  [phone].  [facility], EMERGENCY ROOM PHONE NUMBER: [phone]  IF A COMPLICATION OR EMERGENCY SITUATION ARISES AND YOU ARE UNABLE TO REACH   YOUR PHYSICIAN - GO DIRECTLY TO THE EMERGENCY ROOM.  Hayder Macias MD  8/29/2022 1:47:07 PM  This report has been verified and signed electronically.  Dear patient,  As a result of recent federal legislation (The Federal Cures Act), you may   receive lab or pathology results from your procedure in your [facility]   account before your physician is able to contact you. Your physician or   their representative will relay the results to you with their   recommendations at their soonest availability.  Thank you,  PROVATION

## 2022-08-29 NOTE — ASSESSMENT & PLAN NOTE
Patient's anemia is currently controlled. Has not received any PRBCs to date.. Etiology likely d/t acute blood loss  Current CBC reviewed-   Lab Results   Component Value Date    HGB 8.7 (L) 08/29/2022    HCT 26.2 (L) 08/29/2022     Monitor serial CBC and transfuse if patient becomes hemodynamically unstable, symptomatic or H/H drops below 7/21.

## 2022-08-30 ENCOUNTER — ANESTHESIA (OUTPATIENT)
Dept: ENDOSCOPY | Facility: HOSPITAL | Age: 52
End: 2022-08-30
Payer: COMMERCIAL

## 2022-08-30 ENCOUNTER — ANESTHESIA EVENT (OUTPATIENT)
Dept: ENDOSCOPY | Facility: HOSPITAL | Age: 52
End: 2022-08-30
Payer: COMMERCIAL

## 2022-08-30 VITALS
HEIGHT: 69 IN | RESPIRATION RATE: 16 BRPM | BODY MASS INDEX: 29.78 KG/M2 | DIASTOLIC BLOOD PRESSURE: 73 MMHG | HEART RATE: 100 BPM | OXYGEN SATURATION: 97 % | SYSTOLIC BLOOD PRESSURE: 128 MMHG | TEMPERATURE: 98 F | WEIGHT: 201.06 LBS

## 2022-08-30 LAB
ALBUMIN SERPL BCP-MCNC: 3 G/DL (ref 3.5–5.2)
ALP SERPL-CCNC: 60 U/L (ref 55–135)
ALT SERPL W/O P-5'-P-CCNC: 20 U/L (ref 10–44)
ANION GAP SERPL CALC-SCNC: 8 MMOL/L (ref 8–16)
AST SERPL-CCNC: 12 U/L (ref 10–40)
BASOPHILS # BLD AUTO: 0.03 K/UL (ref 0–0.2)
BASOPHILS NFR BLD: 0.5 % (ref 0–1.9)
BILIRUB SERPL-MCNC: 0.4 MG/DL (ref 0.1–1)
BUN SERPL-MCNC: 9 MG/DL (ref 6–20)
CALCIUM SERPL-MCNC: 8.9 MG/DL (ref 8.7–10.5)
CHLORIDE SERPL-SCNC: 107 MMOL/L (ref 95–110)
CO2 SERPL-SCNC: 29 MMOL/L (ref 23–29)
CREAT SERPL-MCNC: 0.7 MG/DL (ref 0.5–1.4)
DIFFERENTIAL METHOD: ABNORMAL
EOSINOPHIL # BLD AUTO: 0.4 K/UL (ref 0–0.5)
EOSINOPHIL NFR BLD: 5.8 % (ref 0–8)
ERYTHROCYTE [DISTWIDTH] IN BLOOD BY AUTOMATED COUNT: 14.2 % (ref 11.5–14.5)
EST. GFR  (NO RACE VARIABLE): >60 ML/MIN/1.73 M^2
GLUCOSE SERPL-MCNC: 96 MG/DL (ref 70–110)
HCT VFR BLD AUTO: 25.8 % (ref 40–54)
HCT VFR BLD AUTO: 29.6 % (ref 40–54)
HGB BLD-MCNC: 8.5 G/DL (ref 14–18)
HGB BLD-MCNC: 9.8 G/DL (ref 14–18)
IMM GRANULOCYTES # BLD AUTO: 0.03 K/UL (ref 0–0.04)
IMM GRANULOCYTES NFR BLD AUTO: 0.5 % (ref 0–0.5)
LYMPHOCYTES # BLD AUTO: 1.3 K/UL (ref 1–4.8)
LYMPHOCYTES NFR BLD: 20.7 % (ref 18–48)
MAGNESIUM SERPL-MCNC: 1.4 MG/DL (ref 1.6–2.6)
MCH RBC QN AUTO: 29.6 PG (ref 27–31)
MCHC RBC AUTO-ENTMCNC: 32.9 G/DL (ref 32–36)
MCV RBC AUTO: 90 FL (ref 82–98)
MONOCYTES # BLD AUTO: 0.6 K/UL (ref 0.3–1)
MONOCYTES NFR BLD: 9.9 % (ref 4–15)
NEUTROPHILS # BLD AUTO: 4 K/UL (ref 1.8–7.7)
NEUTROPHILS NFR BLD: 62.6 % (ref 38–73)
NRBC BLD-RTO: 0 /100 WBC
PLATELET # BLD AUTO: 232 K/UL (ref 150–450)
PMV BLD AUTO: 9.8 FL (ref 9.2–12.9)
POTASSIUM SERPL-SCNC: 4.1 MMOL/L (ref 3.5–5.1)
PROT SERPL-MCNC: 5.3 G/DL (ref 6–8.4)
RBC # BLD AUTO: 2.87 M/UL (ref 4.6–6.2)
SODIUM SERPL-SCNC: 144 MMOL/L (ref 136–145)
WBC # BLD AUTO: 6.39 K/UL (ref 3.9–12.7)

## 2022-08-30 PROCEDURE — 83735 ASSAY OF MAGNESIUM: CPT | Performed by: NURSE PRACTITIONER

## 2022-08-30 PROCEDURE — 45380 COLONOSCOPY AND BIOPSY: CPT | Performed by: INTERNAL MEDICINE

## 2022-08-30 PROCEDURE — 45380 COLONOSCOPY AND BIOPSY: CPT | Mod: ,,, | Performed by: INTERNAL MEDICINE

## 2022-08-30 PROCEDURE — 88305 TISSUE EXAM BY PATHOLOGIST: CPT | Performed by: STUDENT IN AN ORGANIZED HEALTH CARE EDUCATION/TRAINING PROGRAM

## 2022-08-30 PROCEDURE — 27201012 HC FORCEPS, HOT/COLD, DISP: Performed by: INTERNAL MEDICINE

## 2022-08-30 PROCEDURE — 36415 COLL VENOUS BLD VENIPUNCTURE: CPT | Performed by: NURSE PRACTITIONER

## 2022-08-30 PROCEDURE — D9220A PRA ANESTHESIA: ICD-10-PCS | Mod: CRNA,,, | Performed by: NURSE ANESTHETIST, CERTIFIED REGISTERED

## 2022-08-30 PROCEDURE — 25000003 PHARM REV CODE 250: Performed by: NURSE PRACTITIONER

## 2022-08-30 PROCEDURE — C9113 INJ PANTOPRAZOLE SODIUM, VIA: HCPCS | Performed by: NURSE PRACTITIONER

## 2022-08-30 PROCEDURE — 96361 HYDRATE IV INFUSION ADD-ON: CPT

## 2022-08-30 PROCEDURE — 63600175 PHARM REV CODE 636 W HCPCS: Performed by: NURSE PRACTITIONER

## 2022-08-30 PROCEDURE — G0378 HOSPITAL OBSERVATION PER HR: HCPCS

## 2022-08-30 PROCEDURE — D9220A PRA ANESTHESIA: Mod: CRNA,,, | Performed by: NURSE ANESTHETIST, CERTIFIED REGISTERED

## 2022-08-30 PROCEDURE — 88305 TISSUE EXAM BY PATHOLOGIST: CPT | Mod: 26,,, | Performed by: STUDENT IN AN ORGANIZED HEALTH CARE EDUCATION/TRAINING PROGRAM

## 2022-08-30 PROCEDURE — D9220A PRA ANESTHESIA: Mod: ANES,,, | Performed by: ANESTHESIOLOGY

## 2022-08-30 PROCEDURE — 85018 HEMOGLOBIN: CPT | Mod: 59 | Performed by: NURSE PRACTITIONER

## 2022-08-30 PROCEDURE — 37000008 HC ANESTHESIA 1ST 15 MINUTES: Performed by: INTERNAL MEDICINE

## 2022-08-30 PROCEDURE — 45380 PR COLONOSCOPY,BIOPSY: ICD-10-PCS | Mod: ,,, | Performed by: INTERNAL MEDICINE

## 2022-08-30 PROCEDURE — 85025 COMPLETE CBC W/AUTO DIFF WBC: CPT | Performed by: NURSE PRACTITIONER

## 2022-08-30 PROCEDURE — 88305 TISSUE EXAM BY PATHOLOGIST: ICD-10-PCS | Mod: 26,,, | Performed by: STUDENT IN AN ORGANIZED HEALTH CARE EDUCATION/TRAINING PROGRAM

## 2022-08-30 PROCEDURE — 80053 COMPREHEN METABOLIC PANEL: CPT | Performed by: NURSE PRACTITIONER

## 2022-08-30 PROCEDURE — D9220A PRA ANESTHESIA: ICD-10-PCS | Mod: ANES,,, | Performed by: ANESTHESIOLOGY

## 2022-08-30 PROCEDURE — 25000003 PHARM REV CODE 250: Performed by: NURSE ANESTHETIST, CERTIFIED REGISTERED

## 2022-08-30 PROCEDURE — 85014 HEMATOCRIT: CPT | Mod: 59 | Performed by: NURSE PRACTITIONER

## 2022-08-30 PROCEDURE — 37000009 HC ANESTHESIA EA ADD 15 MINS: Performed by: INTERNAL MEDICINE

## 2022-08-30 PROCEDURE — 63600175 PHARM REV CODE 636 W HCPCS: Performed by: NURSE ANESTHETIST, CERTIFIED REGISTERED

## 2022-08-30 PROCEDURE — 96376 TX/PRO/DX INJ SAME DRUG ADON: CPT

## 2022-08-30 RX ORDER — PROPOFOL 10 MG/ML
VIAL (ML) INTRAVENOUS
Status: DISCONTINUED | OUTPATIENT
Start: 2022-08-30 | End: 2022-08-30

## 2022-08-30 RX ORDER — METOPROLOL SUCCINATE 25 MG/1
25 TABLET, EXTENDED RELEASE ORAL DAILY
Status: DISCONTINUED | OUTPATIENT
Start: 2022-08-31 | End: 2022-08-30 | Stop reason: HOSPADM

## 2022-08-30 RX ORDER — LIDOCAINE HCL/PF 100 MG/5ML
SYRINGE (ML) INTRAVENOUS
Status: DISCONTINUED | OUTPATIENT
Start: 2022-08-30 | End: 2022-08-30

## 2022-08-30 RX ADMIN — Medication 1000 UNITS: at 08:08

## 2022-08-30 RX ADMIN — LIDOCAINE HYDROCHLORIDE 100 MG: 20 INJECTION INTRAVENOUS at 01:08

## 2022-08-30 RX ADMIN — SODIUM CHLORIDE, POTASSIUM CHLORIDE, SODIUM LACTATE AND CALCIUM CHLORIDE: 600; 310; 30; 20 INJECTION, SOLUTION INTRAVENOUS at 10:08

## 2022-08-30 RX ADMIN — PROPOFOL 50 MG: 10 INJECTION, EMULSION INTRAVENOUS at 01:08

## 2022-08-30 RX ADMIN — THERA TABS 1 TABLET: TAB at 08:08

## 2022-08-30 RX ADMIN — BUPROPION HYDROCHLORIDE 150 MG: 150 TABLET, FILM COATED, EXTENDED RELEASE ORAL at 08:08

## 2022-08-30 RX ADMIN — PROPOFOL 100 MG: 10 INJECTION, EMULSION INTRAVENOUS at 01:08

## 2022-08-30 RX ADMIN — PANTOPRAZOLE SODIUM 40 MG: 40 INJECTION, POWDER, LYOPHILIZED, FOR SOLUTION INTRAVENOUS at 08:08

## 2022-08-30 NOTE — NURSING
Patient Blood glucose level is 92 0800. Patient used his luis monitor.     1126 . Used patients luis.

## 2022-08-30 NOTE — ASSESSMENT & PLAN NOTE
Chronic, controlled.  Latest blood pressure and vitals reviewed-   Temp:  [96.4 °F (35.8 °C)-98.5 °F (36.9 °C)]   Pulse:  [73-82]   Resp:  [16-20]   BP: (106-124)/(59-78)   SpO2:  [93 %-98 %] .   Home meds for hypertension were reviewed and noted below.   Hypertension Medications             metoprolol succinate (TOPROL-XL) 25 MG 24 hr tablet Take 25 mg by mouth once daily.           While in the hospital, will manage blood pressure as follows; Continue home antihypertensive regimen    Will utilize p.r.n. blood pressure medication only if patient's blood pressure greater than  180/110 and he develops symptoms such as worsening chest pain or shortness of breath.

## 2022-08-30 NOTE — PLAN OF CARE
Plan of care reviewed with patient. Verbalized understanding. No complaints of pain. VSS. Tele in place and being monitored. Colon prep started and patient is taking as tolerated. IV intact and infusing fluids. Patient is aware of NPO status for test in am. Ambulatory in room without difficulty. Patient able to make needs known. Safety maintained. Call light in reach and instructed to call for assistance. Will continue to monitor.

## 2022-08-30 NOTE — SUBJECTIVE & OBJECTIVE
Interval History:  Notes reviewed, no acute events overnight.  Patient performed GoLYTELY prep last night and states his stool is clear today.  He denies any further rectal bleeding or melena.  He denies abdominal pain, chest pain or shortness breast.  Pt reports lightness and dizziness when standing is now resolved.  Awaiting colonoscopy today.  H&H remains stable.  Hopefully can discharge home after colonoscopy today.  Patient and significant other at bedside verbalized understanding of plan of care.      Review of Systems   Constitutional:  Negative for chills, fatigue and fever.   HENT:  Negative for congestion and sore throat.    Eyes:  Negative for visual disturbance.   Respiratory:  Negative for cough and shortness of breath.    Cardiovascular:  Negative for chest pain and palpitations.   Gastrointestinal:  Negative for abdominal pain, anal bleeding, blood in stool, constipation, diarrhea, nausea and vomiting.   Endocrine: Negative for cold intolerance and heat intolerance.   Genitourinary:  Negative for dysuria and hematuria.   Musculoskeletal:  Negative for arthralgias and myalgias.   Skin:  Negative for rash.   Neurological:  Negative for tremors, seizures, syncope, weakness and light-headedness.   Hematological:  Negative for adenopathy. Does not bruise/bleed easily.   All other systems reviewed and are negative.  Objective:     Vital Signs (Most Recent):  Temp: 97.7 °F (36.5 °C) (08/30/22 1132)  Pulse: 73 (08/30/22 1132)  Resp: 16 (08/30/22 1132)  BP: 121/69 (08/30/22 1132)  SpO2: 98 % (08/30/22 1132) Vital Signs (24h Range):  Temp:  [96.4 °F (35.8 °C)-98.5 °F (36.9 °C)] 97.7 °F (36.5 °C)  Pulse:  [73-82] 73  Resp:  [16-20] 16  SpO2:  [93 %-98 %] 98 %  BP: (106-124)/(59-78) 121/69     Weight: 91.2 kg (201 lb 1 oz)  Body mass index is 29.69 kg/m².    Intake/Output Summary (Last 24 hours) at 8/30/2022 1144  Last data filed at 8/30/2022 0550  Gross per 24 hour   Intake 2192.37 ml   Output --   Net 2192.37  ml        Physical Exam  Vitals and nursing note reviewed.   Constitutional:       General: He is awake. He is not in acute distress.     Appearance: Normal appearance. He is well-developed and overweight.   HENT:      Head: Normocephalic and atraumatic.      Nose: Nose normal. No septal deviation.      Mouth/Throat:      Lips: Pink.      Mouth: Mucous membranes are moist.   Eyes:      Conjunctiva/sclera: Conjunctivae normal.      Pupils: Pupils are equal, round, and reactive to light.   Neck:      Thyroid: No thyroid mass.      Vascular: No JVD.      Trachea: No tracheal tenderness or tracheal deviation.   Cardiovascular:      Rate and Rhythm: Normal rate and regular rhythm.      Heart sounds: Normal heart sounds, S1 normal and S2 normal. No murmur heard.    No friction rub. No gallop.   Pulmonary:      Effort: Pulmonary effort is normal.      Breath sounds: Normal breath sounds. No decreased breath sounds, wheezing, rhonchi or rales.   Abdominal:      General: Abdomen is protuberant. Bowel sounds are normal. There is no distension.      Palpations: Abdomen is soft. There is no hepatomegaly, splenomegaly or mass.      Tenderness: There is no abdominal tenderness.   Musculoskeletal:      Right lower leg: No edema.      Left lower leg: No edema.   Skin:     General: Skin is warm and dry.      Capillary Refill: Capillary refill takes 2 to 3 seconds.      Findings: No rash.   Neurological:      General: No focal deficit present.      Mental Status: He is alert and oriented to person, place, and time. Mental status is at baseline.      Cranial Nerves: No cranial nerve deficit.      Sensory: Sensation is intact. No sensory deficit.      Motor: Motor function is intact.   Psychiatric:         Mood and Affect: Mood normal.         Behavior: Behavior normal. Behavior is cooperative.       Significant Labs: All pertinent labs within the past 24 hours have been reviewed.  CBC:   Recent Labs   Lab 08/28/22 2035 08/29/22  0392  08/30/22  0420   WBC 8.45 6.81 6.39   HGB 9.5* 8.7* 8.5*   HCT 28.0* 26.2* 25.8*    246 232       CMP:   Recent Labs   Lab 08/28/22 2035 08/29/22  0538 08/30/22  0420    141 144   K 4.3 3.9 4.1    104 107   CO2 19* 27 29    89 96   BUN 17 15 9   CREATININE 1.1 0.8 0.7   CALCIUM 9.2 9.1 8.9   PROT 6.5 5.7* 5.3*   ALBUMIN 3.6 3.2* 3.0*   BILITOT 0.4 0.4 0.4   ALKPHOS 69 64 60   AST 20 17 12   ALT 24 23 20   ANIONGAP 16 10 8         Significant Imaging: I have reviewed all pertinent imaging results/findings within the past 24 hours.

## 2022-08-30 NOTE — PROGRESS NOTES
[facility] Hospital Medicine  Progress Note    Patient Name: Jules Floyd Jr.  MRN: [id number]  Patient Class: OP- Observation   Admission Date: 8/28/2022  Length of Stay: 0 days  Attending Physician: Benny Calderon MD  Primary Care Provider: Ina Ellsworth NP        Subjective:     Principal Problem:GIB (gastrointestinal bleeding)        HPI:  Jules Floyd is a 51 year old male with a past medical history of HTN, HLD, T2DM, GERD, and s/p gastric sleeve (4/2022), who presented to the ED with weakness, near syncope and blood in stools since yesterday. He was discharged from this hospital on 8/26/22 for rectal bleeding and symptomatic anemia after being admitted for the past week. He had an EGD and colonoscopy performed with no findings of acute bleeding, but blood clots were found in the colon, concerning for a diverticular bleed. He then underwent a CTA of the abdomen with GI bleeding protocol, which also showed no active bleeding. His weakness did improve as well as his labs, and he was discharged with a hemoglobin of 9.5. He was due to have a repeat CBC tomorrow, but felt too weak to wait. He stated that when he tried standing up, he had to sit back down due to dizziness and lightheadedness. He states that he is still passing blood in his stools. He denies abdominal pain, nausea, vomiting, diarrhea or other complaint. Lab work reveals a relatively unchanged CBC, with an H/H of 9.5/28, similar to one 2 days ago which showed an H/H of 9.5/27.9. CMP appears within normal ranges. GI consulted, Dr. Cabrera notified and plans to possibly perform an EGD with push in the morning. Hospital Medicine consulted for admission and further management.       Overview/Hospital Course:  No notes on file    Interval History:  Notes reviewed, no acute events overnight.  Patient performed GoLYTELY prep last night and states his stool is clear today.  He denies any further rectal bleeding or melena.  He denies  abdominal pain, chest pain or shortness breast.  Pt reports lightness and dizziness when standing is now resolved.  Awaiting colonoscopy today.  H&H remains stable.  Hopefully can discharge home after colonoscopy today.  Patient and significant other at bedside verbalized understanding of plan of care.      Review of Systems   Constitutional:  Negative for chills, fatigue and fever.   HENT:  Negative for congestion and sore throat.    Eyes:  Negative for visual disturbance.   Respiratory:  Negative for cough and shortness of breath.    Cardiovascular:  Negative for chest pain and palpitations.   Gastrointestinal:  Negative for abdominal pain, anal bleeding, blood in stool, constipation, diarrhea, nausea and vomiting.   Endocrine: Negative for cold intolerance and heat intolerance.   Genitourinary:  Negative for dysuria and hematuria.   Musculoskeletal:  Negative for arthralgias and myalgias.   Skin:  Negative for rash.   Neurological:  Negative for tremors, seizures, syncope, weakness and light-headedness.   Hematological:  Negative for adenopathy. Does not bruise/bleed easily.   All other systems reviewed and are negative.  Objective:     Vital Signs (Most Recent):  Temp: 97.7 °F (36.5 °C) (08/30/22 1132)  Pulse: 73 (08/30/22 1132)  Resp: 16 (08/30/22 1132)  BP: 121/69 (08/30/22 1132)  SpO2: 98 % (08/30/22 1132) Vital Signs (24h Range):  Temp:  [96.4 °F (35.8 °C)-98.5 °F (36.9 °C)] 97.7 °F (36.5 °C)  Pulse:  [73-82] 73  Resp:  [16-20] 16  SpO2:  [93 %-98 %] 98 %  BP: (106-124)/(59-78) 121/69     Weight: 91.2 kg (201 lb 1 oz)  Body mass index is 29.69 kg/m².    Intake/Output Summary (Last 24 hours) at 8/30/2022 1144  Last data filed at 8/30/2022 0550  Gross per 24 hour   Intake 2192.37 ml   Output --   Net 2192.37 ml        Physical Exam  Vitals and nursing note reviewed.   Constitutional:       General: He is awake. He is not in acute distress.     Appearance: Normal appearance. He is well-developed and overweight.    HENT:      Head: Normocephalic and atraumatic.      Nose: Nose normal. No septal deviation.      Mouth/Throat:      Lips: Pink.      Mouth: Mucous membranes are moist.   Eyes:      Conjunctiva/sclera: Conjunctivae normal.      Pupils: Pupils are equal, round, and reactive to light.   Neck:      Thyroid: No thyroid mass.      Vascular: No JVD.      Trachea: No tracheal tenderness or tracheal deviation.   Cardiovascular:      Rate and Rhythm: Normal rate and regular rhythm.      Heart sounds: Normal heart sounds, S1 normal and S2 normal. No murmur heard.    No friction rub. No gallop.   Pulmonary:      Effort: Pulmonary effort is normal.      Breath sounds: Normal breath sounds. No decreased breath sounds, wheezing, rhonchi or rales.   Abdominal:      General: Abdomen is protuberant. Bowel sounds are normal. There is no distension.      Palpations: Abdomen is soft. There is no hepatomegaly, splenomegaly or mass.      Tenderness: There is no abdominal tenderness.   Musculoskeletal:      Right lower leg: No edema.      Left lower leg: No edema.   Skin:     General: Skin is warm and dry.      Capillary Refill: Capillary refill takes 2 to 3 seconds.      Findings: No rash.   Neurological:      General: No focal deficit present.      Mental Status: He is alert and oriented to person, place, and time. Mental status is at baseline.      Cranial Nerves: No cranial nerve deficit.      Sensory: Sensation is intact. No sensory deficit.      Motor: Motor function is intact.   Psychiatric:         Mood and Affect: Mood normal.         Behavior: Behavior normal. Behavior is cooperative.       Significant Labs: All pertinent labs within the past 24 hours have been reviewed.  CBC:   Recent Labs   Lab 08/28/22 2035 08/29/22  0538 08/30/22  0420   WBC 8.45 6.81 6.39   HGB 9.5* 8.7* 8.5*   HCT 28.0* 26.2* 25.8*    246 232       CMP:   Recent Labs   Lab 08/28/22 2035 08/29/22  0538 08/30/22  0420    141 144   K 4.3 3.9  4.1    104 107   CO2 19* 27 29    89 96   BUN 17 15 9   CREATININE 1.1 0.8 0.7   CALCIUM 9.2 9.1 8.9   PROT 6.5 5.7* 5.3*   ALBUMIN 3.6 3.2* 3.0*   BILITOT 0.4 0.4 0.4   ALKPHOS 69 64 60   AST 20 17 12   ALT 24 23 20   ANIONGAP 16 10 8         Significant Imaging: I have reviewed all pertinent imaging results/findings within the past 24 hours.      Assessment/Plan:      * GIB (gastrointestinal bleeding)  NPO for colonoscopy today  H/H stable  Continue Protonix BID  EGD yesterday normal  Iron, TIBC, B12 and folic acid, reticulocyte count reviewed  Continue IVF hydration.   Use IV anti-emetics as needed.           Acute blood loss anemia  Patient's anemia is currently controlled. Has not received any PRBCs to date.. Etiology likely d/t acute blood loss from GIB  Current CBC reviewed-   Lab Results   Component Value Date    HGB 8.5 (L) 08/30/2022    HCT 25.8 (L) 08/30/2022     Monitor serial CBC and transfuse if patient becomes hemodynamically unstable, symptomatic or H/H drops below 7/21.         Hyperlipidemia  Noted, not currently taking medications      GERD (gastroesophageal reflux disease)  Noted, continue protonix      Hx of gastric bypass  noted      Hypertension    Chronic, controlled.  Latest blood pressure and vitals reviewed-   Temp:  [96.4 °F (35.8 °C)-98.5 °F (36.9 °C)]   Pulse:  [73-82]   Resp:  [16-20]   BP: (106-124)/(59-78)   SpO2:  [93 %-98 %] .   Home meds for hypertension were reviewed and noted below.   Hypertension Medications             metoprolol succinate (TOPROL-XL) 25 MG 24 hr tablet Take 25 mg by mouth once daily.           While in the hospital, will manage blood pressure as follows; Continue home antihypertensive regimen    Will utilize p.r.n. blood pressure medication only if patient's blood pressure greater than  180/110 and he develops symptoms such as worsening chest pain or shortness of breath.      Diabetes mellitus 2  Patient's FSGs are controlled on current  medication regimen.  Last A1c reviewed-   Lab Results   Component Value Date    HGBA1C 10.3 (H) 07/28/2020     Most recent fingerstick glucose reviewed- No results for input(s): POCTGLUCOSE in the last 24 hours.  Current correctional scale  Low  Maintain anti-hyperglycemic dose as follows-   Antihyperglycemics (From admission, onward)    Start     Stop Route Frequency Ordered    08/29/22 0051  insulin aspart U-100 pen 1-10 Units         -- SubQ Every 6 hours PRN 08/28/22 2351        Hold Oral hypoglycemics while patient is in the hospital.      VTE Risk Mitigation (From admission, onward)         Ordered     IP VTE HIGH RISK PATIENT  Once         08/28/22 2250     Place sequential compression device  Until discontinued         08/28/22 2250                Discharge Planning   CELESTINA: 8/30/2022     Code Status: Full Code   Is the patient medically ready for discharge?:     Reason for patient still in hospital (select all that apply): Imaging and Consult recommendations  Discharge Plan A: Home with family                  Alice Soto NP  Department of Hospital Medicine   [facility]

## 2022-08-30 NOTE — ASSESSMENT & PLAN NOTE
NPO for colonoscopy today  H/H stable  Continue Protonix BID  EGD yesterday normal  Iron, TIBC, B12 and folic acid, reticulocyte count reviewed  Continue IVF hydration.   Use IV anti-emetics as needed.

## 2022-08-30 NOTE — PROVATION PATIENT INSTRUCTIONS
Discharge Summary/Instructions after an Endoscopic Procedure  Patient Name: Jules Floyd  Patient MRN: [id number]  Patient YOB: 1970  Tuesday, August 30, 2022  Carie Cabrera MD  Dear patient,  As a result of recent federal legislation (The Federal Cures Act), you may   receive lab or pathology results from your procedure in your [facility]   account before your physician is able to contact you. Your physician or   their representative will relay the results to you with their   recommendations at their soonest availability.  Thank you,  RESTRICTIONS:  During your procedure today, you received medications for sedation.  These   medications may affect your judgment, balance and coordination.  Therefore,   for 24 hours, you have the following restrictions:   - DO NOT drive a car, operate machinery, make legal/financial decisions,   sign important papers or drink alcohol.    ACTIVITY:  Today: no heavy lifting, straining or running due to procedural   sedation/anesthesia.  The following day: return to full activity including work.  DIET:  Eat and drink normally unless instructed otherwise.     TREATMENT FOR COMMON SIDE EFFECTS:  - Mild abdominal pain, nausea, belching, bloating or excessive gas:  rest,   eat lightly and use a heating pad.  - Sore Throat: treat with throat lozenges and/or gargle with warm salt   water.  - Because air was used during the procedure, expelling large amounts of air   from your rectum or belching is normal.  - If a bowel prep was taken, you may not have a bowel movement for 1-3 days.    This is normal.  SYMPTOMS TO WATCH FOR AND REPORT TO YOUR PHYSICIAN:  1. Abdominal pain or bloating, other than gas cramps.  2. Chest pain.  3. Back pain.  4. Signs of infection such as: chills or fever occurring within 24 hours   after the procedure.  5. Rectal bleeding, which would show as bright red, maroon, or black stools.   (A tablespoon of blood from the rectum is not serious,  especially if   hemorrhoids are present.)  6. Vomiting.  7. Weakness or dizziness.  GO DIRECTLY TO THE NEAREST EMERGENCY ROOM IF YOU HAVE ANY OF THE FOLLOWING:      Difficulty breathing              Chills and/or fever over 101 F   Persistent vomiting and/or vomiting blood   Severe abdominal pain   Severe chest pain   Black, tarry stools   Bleeding- more than one tablespoon   Any other symptom or condition that you feel may need urgent attention  Your doctor recommends these additional instructions:  If any biopsies were taken, your doctors clinic will contact you in 1 to 2   weeks with any results.  - Return patient to [facility] for ongoing care.   - Advance diet as tolerated.   - Continue present medications.   - Repeat colonoscopy date to be determined after pending pathology results   are reviewed for surveillance based on pathology results.   -Should patient re-bleed recommend STAT CTA with GI bleeding protocol  -Ok for diet and to discharge home  -Avoid NSAIDs x 14 days  For questions, problems or results please call your physician - Carie Cabrera MD at Work:  [phone].  [facility], EMERGENCY ROOM PHONE NUMBER: [phone]  IF A COMPLICATION OR EMERGENCY SITUATION ARISES AND YOU ARE UNABLE TO REACH   YOUR PHYSICIAN - GO DIRECTLY TO THE EMERGENCY ROOM.  Carie Cabrera MD  8/30/2022 1:27:36 PM  This report has been verified and signed electronically.  Dear patient,  As a result of recent federal legislation (The Federal Cures Act), you may   receive lab or pathology results from your procedure in your [facility]   account before your physician is able to contact you. Your physician or   their representative will relay the results to you with their   recommendations at their soonest availability.  Thank you,  PROVATION

## 2022-08-30 NOTE — NURSING
Patient checked glucose per CGM and result ws 170.Informed patient of orders to give insulin and he stated he has not had insulin since his gastric sleeve and did not want to start taking it. Faiza GUERIN notified and stated fine to hold insulin coverage.

## 2022-08-30 NOTE — PLAN OF CARE
The pt is cleared for discharge home from case management and has follow up appts added to his AVS.    08/30/22 3308   Final Note   Assessment Type Final Discharge Note   Anticipated Discharge Disposition Home   Hospital Resources/Appts/Education Provided Appointments scheduled and added to AVS

## 2022-08-30 NOTE — ASSESSMENT & PLAN NOTE
Patient's anemia is currently controlled. Has not received any PRBCs to date.. Etiology likely d/t acute blood loss from GIB  Current CBC reviewed-   Lab Results   Component Value Date    HGB 8.5 (L) 08/30/2022    HCT 25.8 (L) 08/30/2022     Monitor serial CBC and transfuse if patient becomes hemodynamically unstable, symptomatic or H/H drops below 7/21.

## 2022-08-30 NOTE — ANESTHESIA PREPROCEDURE EVALUATION
08/30/2022  Jules Floyd Jr. is a 51 y.o., male.      Pre-op Assessment    I have reviewed the Patient Summary Reports.     I have reviewed the Nursing Notes. I have reviewed the NPO Status.   I have reviewed the Medications.     Review of Systems  Anesthesia Hx:  No problems with previous Anesthesia Denies Hx of Anesthetic complications Hx of gastric bypass   Social:  Non-Smoker    Cardiovascular:   Hypertension Denies MI.  Denies CAD.    Denies CABG/stent.   Denies Angina.    Pulmonary:   Denies COPD.  Denies Asthma.  Denies Recent URI.    Renal/:   Denies Chronic Renal Disease.     Hepatic/GI:   GERD Denies Liver Disease.    Neurological:   Denies TIA. Denies CVA. Denies Seizures.    Endocrine:   Diabetes Denies Hypothyroidism.    Psych:   Denies Psychiatric History.          Physical Exam  General: Well nourished, Cooperative, Alert and Oriented    Airway:  Mallampati: II / II  Mouth Opening: Normal  TM Distance: 4 - 6 cm  Tongue: Normal    Dental:  Intact    Chest/Lungs:  Clear to auscultation, Normal Respiratory Rate    Heart:  Rate: Normal  Rhythm: Regular Rhythm  Sounds: Normal        Anesthesia Plan  Type of Anesthesia, risks & benefits discussed:    Anesthesia Type: Gen Natural Airway  Intra-op Monitoring Plan: Standard ASA Monitors  Induction:  IV  Informed Consent: Informed consent signed with the Patient and all parties understand the risks and agree with anesthesia plan.  All questions answered.   ASA Score: 2    Ready For Surgery From Anesthesia Perspective.     .

## 2022-08-30 NOTE — ANESTHESIA POSTPROCEDURE EVALUATION
Anesthesia Post Evaluation    Patient: Jules Floyd Jr.    Procedure(s) Performed: Procedure(s) (LRB):  EGD (ESOPHAGOGASTRODUODENOSCOPY)(with push) (N/A)    Final Anesthesia Type: general      Patient location during evaluation: PACU  Patient participation: Yes- Able to Participate  Level of consciousness: awake and alert  Post-procedure vital signs: reviewed and stable  Pain management: adequate  Airway patency: patent    PONV status at discharge: No PONV  Anesthetic complications: no      Cardiovascular status: hemodynamically stable  Respiratory status: unassisted and room air  Hydration status: euvolemic  Follow-up not needed.          Vitals Value Taken Time   /59 08/29/22 1917   Temp 36.2 °C (97.2 °F) 08/29/22 1917   Pulse 82 08/29/22 1917   Resp 18 08/29/22 1917   SpO2 95 % 08/29/22 1917         Event Time   Out of Recovery 08/29/2022 14:22:08         Pain/Kayla Score: No data recorded

## 2022-08-30 NOTE — PLAN OF CARE
AVS updated with PCP appt with Ina Ellsworth for 9/1/22 at 3:45 pm and Dr. Murray for 10/5/22 at 1:00 pm. AVS updated.    08/30/22 9418   Discharge Assessment   Assessment Type Discharge Planning Reassessment

## 2022-08-30 NOTE — TRANSFER OF CARE
"Anesthesia Transfer of Care Note    Patient: Jules Floyd Jr.    Procedure(s) Performed: Procedure(s) (LRB):  COLONOSCOPY (N/A)    Patient location: PACU    Anesthesia Type: general    Transport from OR: Transported from OR on 2-3 L/min O2 by NC with adequate spontaneous ventilation    Post pain: adequate analgesia    Post assessment: no apparent anesthetic complications and tolerated procedure well    Post vital signs: stable    Level of consciousness: awake, alert and oriented    Nausea/Vomiting: no nausea/vomiting    Complications: none    Transfer of care protocol was followed      Last vitals:   Visit Vitals  /76 (BP Location: Left arm, Patient Position: Lying)   Pulse 72   Temp 36.7 °C (98.1 °F) (Skin)   Resp 14   Ht 5' 9" (1.753 m)   Wt 91.2 kg (201 lb 1 oz)   SpO2 97%   BMI 29.69 kg/m²     "

## 2022-08-30 NOTE — NURSING
Discharge paperwork reviewed with patient and wife. Patient and wife verbalized understanding. IVs removed and charted. Patient left via wheelchair to personal vehicle.

## 2022-08-30 NOTE — DISCHARGE INSTRUCTIONS
Rest and drink adequate fluids.  Please follow-up as directed.  Avoid NSAIDs for the next 2 weeks.  Return to ER for any worsening symptoms or concerns.    Discharge Instructions, [facility] Medicine    Thank you for choosing [facility] for your medical care.     You were admitted to the hospital with GIB (gastrointestinal bleeding).     Please note your discharge instructions, including diet/activity restrictions, follow-up appointments, and medication changes.  If you have any questions about your medical issues, prescriptions, or any other questions, please feel free to contact the [facility] Medicine Dept at [phone] and we will help.    If you are previously with Home health, outpatient PT/OT or under a therapy program, you are cleared to return to those programs.    Please direct all long term medication refills and follow up to your primary care provider, Ina Ellsworth NP. Thank you again for letting us take care of your health care needs.    Please note the following discharge instructions per your discharging physician-  Alice Soto NP

## 2022-08-31 NOTE — DISCHARGE SUMMARY
[facility] Medicine  Discharge Summary      Patient Name: Jules Floyd Jr.  MRN: [id number]  Patient Class: OP- Observation  Admission Date: 8/28/2022  Hospital Length of Stay: 0 days  Discharge Date and Time: 8/30/2022  4:40 PM  Attending Physician: No att. providers found   Discharging Provider: Alice Soto NP  Primary Care Provider: Ina Ellsworth NP      HPI:   Jules Floyd is a 51 year old male with a past medical history of HTN, HLD, T2DM, GERD, and s/p gastric sleeve (4/2022), who presented to the ED with weakness, near syncope and blood in stools since yesterday. He was discharged from this hospital on 8/26/22 for rectal bleeding and symptomatic anemia after being admitted for the past week. He had an EGD and colonoscopy performed with no findings of acute bleeding, but blood clots were found in the colon, concerning for a diverticular bleed. He then underwent a CTA of the abdomen with GI bleeding protocol, which also showed no active bleeding. His weakness did improve as well as his labs, and he was discharged with a hemoglobin of 9.5. He was due to have a repeat CBC tomorrow, but felt too weak to wait. He stated that when he tried standing up, he had to sit back down due to dizziness and lightheadedness. He states that he is still passing blood in his stools. He denies abdominal pain, nausea, vomiting, diarrhea or other complaint. Lab work reveals a relatively unchanged CBC, with an H/H of 9.5/28, similar to one 2 days ago which showed an H/H of 9.5/27.9. CMP appears within normal ranges. GI consulted, Dr. Cabrera notified and plans to possibly perform an EGD with push in the morning. Hospital Medicine consulted for admission and further management.       Procedure(s) (LRB):  COLONOSCOPY (N/A)      Hospital Course:   Patient was monitor closely during his hospital stay.  H&H remained stable.  He underwent repeat EGD which was negative.  He remained on clear liquids  and underwent colonoscopy on 08/30 which revealed a localized area of mildly erythematous mucosa was found in the recto-sigmoid colon and area was biopsied with a cold forceps for histology.  Patient requested repeat H&H post colonoscopy which was performed and revealed significant improvement in blood count.  Patient felt well post colonoscopy and had no further melena or rectal bleeding and desired to go home.  Patient was cleared for discharge and instructed on avoiding NSAIDs for 14 days and close outpatient follow-up with GI.  Patient verbalized understanding of discharge instructions and return precautions.       Goals of Care Treatment Preferences:  Code Status: Full Code      Consults:   Consults (From admission, onward)        Status Ordering Provider     Inpatient consult to Gastroenterology  Once        Provider:  Bin Wallace MD    Completed CAROLYN DORADO          No new Assessment & Plan notes have been filed under this hospital service since the last note was generated.  Service: Hospital Medicine    Final Active Diagnoses:    Diagnosis Date Noted POA    PRINCIPAL PROBLEM:  GIB (gastrointestinal bleeding) [K92.2] 08/28/2022 Yes    Hyperlipidemia [E78.5] 08/28/2022 Yes    Acute blood loss anemia [D62] 08/28/2022 Yes    Hx of gastric bypass [Z98.84] 08/25/2022 Not Applicable    GERD (gastroesophageal reflux disease) [K21.9] 08/25/2022 Yes    Diabetes mellitus 2 [E11.9] 09/10/2019 Yes    Hypertension [I10] 09/10/2019 Yes      Problems Resolved During this Admission:       Discharged Condition: good    Disposition: Home or Self Care    Follow Up:   Follow-up Information     Bin Wallace MD Follow up on 10/5/2022.    Specialty: Gastroenterology  Why: 1:00 pm  Contact information:  [address]  [address]  [place] [address]  [phone]             Ina Ellsworth NP Follow up on 9/1/2022.    Specialty: Family Medicine  Why: [address] 3:45 pm  Contact  "information:  [address]  [facility] [address]  [phone]                       Patient Instructions:      Diet Adult Regular     Notify your health care provider if you experience any of the following:  temperature >100.4     Notify your health care provider if you experience any of the following:  persistent nausea and vomiting or diarrhea     Notify your health care provider if you experience any of the following:  severe uncontrolled pain     Notify your health care provider if you experience any of the following:  difficulty breathing or increased cough     Notify your health care provider if you experience any of the following:  persistent dizziness, light-headedness, or visual disturbances     Notify your health care provider if you experience any of the following:  increased confusion or weakness     Activity as tolerated       Significant Diagnostic Studies: Labs:   CMP   Recent Labs   Lab 08/30/22  0420      K 4.1      CO2 29   GLU 96   BUN 9   CREATININE 0.7   CALCIUM 8.9   PROT 5.3*   ALBUMIN 3.0*   BILITOT 0.4   ALKPHOS 60   AST 12   ALT 20   ANIONGAP 8   , CBC   Recent Labs   Lab 08/30/22  0420 08/30/22  1553   WBC 6.39  --    HGB 8.5* 9.8*   HCT 25.8* 29.6*     --     and All labs within the past 24 hours have been reviewed    Pending Diagnostic Studies:     Procedure Component Value Units Date/Time    Specimen to Pathology, Surgery Gastrointestinal tract [[id number]] Collected: 08/30/22 1320    Order Status: Sent Lab Status: In process Updated: 08/30/22 1528    Specimen: Tissue     Specimen to Pathology, Surgery Gastrointestinal tract [[id number]] Collected: 08/29/22 1346    Order Status: Sent Lab Status: In process Updated: 08/30/22 0803    Specimen: Tissue          Medications:  Reconciled Home Medications:      Medication List      CONTINUE taking these medications    BD ULTRA-FINE SHELDON PEN NEEDLE 32 gauge x 5/32" Ndle  Generic drug: pen needle, " diabetic  1 pen by Misc.(Non-Drug; Combo Route) route 3 (three) times daily.     buPROPion 150 MG TB24 tablet  Commonly known as: WELLBUTRIN XL  Take 150 mg by mouth once daily.     calcium citrate 200 mg (950 mg) tablet  Commonly known as: CALCITRATE  Take 1 tablet by mouth once daily.     FREESTYLE LOREN 14 DAY SENSOR Kit  Generic drug: flash glucose sensor  USE AS DIRECTED     meclizine 50 MG tablet  Commonly known as: ANTIVERT  Take 25 mg by mouth as needed.     metFORMIN 1000 MG tablet  Commonly known as: GLUCOPHAGE  Take 1 tablet (1,000 mg total) by mouth 2 (two) times daily with meals.     metoprolol succinate 100 MG 24 hr tablet  Commonly known as: TOPROL-XL  Take 100 mg by mouth once daily. Pt takes 50mg in am, 50mg in pm.     multivitamin capsule  Take 1 capsule by mouth once daily. bariatric     omeprazole 20 MG capsule  Commonly known as: PRILOSEC  Take 20 mg by mouth once daily.     oxyCODONE-acetaminophen  mg per tablet  Commonly known as: PERCOCET  Take 1 tablet by mouth 2 (two) times daily as needed.     PRODIGY NO CODING Strp  Generic drug: blood sugar diagnostic  1 strip by Misc.(Non-Drug; Combo Route) route as needed.     PRODIGY TWIST TOP LANCET 28 gauge Misc  Generic drug: lancets  1 lancet by Misc.(Non-Drug; Combo Route) route 3 (three) times daily as needed.     vitamin D 1000 units Tab  Commonly known as: VITAMIN D3  Take 250 Units by mouth once daily.            Indwelling Lines/Drains at time of discharge:   Lines/Drains/Airways     None                 Time spent on the discharge of patient: 41 minutes         Alice Soto NP  Department of Hospital Medicine  [facility]

## 2022-08-31 NOTE — HOSPITAL COURSE
Patient was monitor closely during his hospital stay.  H&H remained stable.  He underwent repeat EGD which was negative.  He remained on clear liquids and underwent colonoscopy on 08/30 which revealed a localized area of mildly erythematous mucosa was found in the recto-sigmoid colon and area was biopsied with a cold forceps for histology.  Patient requested repeat H&H post colonoscopy which was performed and revealed significant improvement in blood count.  Patient felt well post colonoscopy and had no further melena or rectal bleeding and desired to go home.  Patient was cleared for discharge and instructed on avoiding NSAIDs for 14 days and close outpatient follow-up with GI.  Patient verbalized understanding of discharge instructions and return precautions.

## 2022-09-01 ENCOUNTER — TELEPHONE (OUTPATIENT)
Dept: MEDSURG UNIT | Facility: HOSPITAL | Age: 52
End: 2022-09-01
Payer: COMMERCIAL

## 2022-09-06 ENCOUNTER — LAB VISIT (OUTPATIENT)
Dept: LAB | Facility: HOSPITAL | Age: 52
End: 2022-09-06
Attending: NURSE PRACTITIONER
Payer: COMMERCIAL

## 2022-09-06 DIAGNOSIS — K57.90 DIVERTICULOSIS: ICD-10-CM

## 2022-09-06 DIAGNOSIS — K62.5 RECTAL BLEEDING: ICD-10-CM

## 2022-09-06 LAB
BASOPHILS # BLD AUTO: 0.04 K/UL (ref 0–0.2)
BASOPHILS NFR BLD: 0.5 % (ref 0–1.9)
DIFFERENTIAL METHOD: ABNORMAL
EOSINOPHIL # BLD AUTO: 0.4 K/UL (ref 0–0.5)
EOSINOPHIL NFR BLD: 5.1 % (ref 0–8)
ERYTHROCYTE [DISTWIDTH] IN BLOOD BY AUTOMATED COUNT: 14.1 % (ref 11.5–14.5)
FINAL PATHOLOGIC DIAGNOSIS: NORMAL
GROSS: NORMAL
HCT VFR BLD AUTO: 30.7 % (ref 40–54)
HGB BLD-MCNC: 10 G/DL (ref 14–18)
IMM GRANULOCYTES # BLD AUTO: 0.02 K/UL (ref 0–0.04)
IMM GRANULOCYTES NFR BLD AUTO: 0.3 % (ref 0–0.5)
LYMPHOCYTES # BLD AUTO: 1.2 K/UL (ref 1–4.8)
LYMPHOCYTES NFR BLD: 15.5 % (ref 18–48)
Lab: NORMAL
MCH RBC QN AUTO: 29.4 PG (ref 27–31)
MCHC RBC AUTO-ENTMCNC: 32.6 G/DL (ref 32–36)
MCV RBC AUTO: 90 FL (ref 82–98)
MONOCYTES # BLD AUTO: 0.7 K/UL (ref 0.3–1)
MONOCYTES NFR BLD: 9.1 % (ref 4–15)
NEUTROPHILS # BLD AUTO: 5.3 K/UL (ref 1.8–7.7)
NEUTROPHILS NFR BLD: 69.5 % (ref 38–73)
NRBC BLD-RTO: 0 /100 WBC
PLATELET # BLD AUTO: 314 K/UL (ref 150–450)
PMV BLD AUTO: 9.7 FL (ref 9.2–12.9)
RBC # BLD AUTO: 3.4 M/UL (ref 4.6–6.2)
WBC # BLD AUTO: 7.61 K/UL (ref 3.9–12.7)

## 2022-09-06 PROCEDURE — 85025 COMPLETE CBC W/AUTO DIFF WBC: CPT | Performed by: NURSE PRACTITIONER

## 2022-09-06 PROCEDURE — 36415 COLL VENOUS BLD VENIPUNCTURE: CPT | Performed by: NURSE PRACTITIONER

## 2022-09-09 LAB
COMMENT: NORMAL
FINAL PATHOLOGIC DIAGNOSIS: NORMAL
GROSS: NORMAL
Lab: NORMAL
MICROSCOPIC EXAM: NORMAL

## 2022-11-28 PROBLEM — K92.2 GIB (GASTROINTESTINAL BLEEDING): Status: RESOLVED | Noted: 2022-08-28 | Resolved: 2022-11-28

## 2022-11-28 PROBLEM — K62.5 RECTAL BLEEDING: Status: RESOLVED | Noted: 2022-08-25 | Resolved: 2022-11-28

## 2023-10-23 ENCOUNTER — HOSPITAL ENCOUNTER (OUTPATIENT)
Facility: HOSPITAL | Age: 53
Discharge: HOME | End: 2023-10-24
Attending: EMERGENCY MEDICINE | Admitting: INTERNAL MEDICINE
Payer: COMMERCIAL

## 2023-10-23 DIAGNOSIS — K92.2 GIB (GASTROINTESTINAL BLEEDING): ICD-10-CM

## 2023-10-23 DIAGNOSIS — K62.5 RECTAL BLEEDING: Primary | ICD-10-CM

## 2023-10-23 DIAGNOSIS — K92.2 GI BLEEDING: ICD-10-CM

## 2023-10-23 PROBLEM — F41.9 ANXIETY: Status: ACTIVE | Noted: 2023-10-23

## 2023-10-23 LAB
ABO GROUP BLD: NORMAL
ALBUMIN SERPL BCP-MCNC: 3.5 G/DL (ref 3.5–5.2)
ALP SERPL-CCNC: 54 U/L (ref 55–135)
ALT SERPL W/O P-5'-P-CCNC: 10 U/L (ref 10–44)
ANION GAP SERPL CALC-SCNC: 9 MMOL/L (ref 8–16)
AST SERPL-CCNC: 11 U/L (ref 10–40)
BASOPHILS # BLD AUTO: 0.06 K/UL (ref 0–0.2)
BASOPHILS # BLD AUTO: 0.07 K/UL (ref 0–0.2)
BASOPHILS NFR BLD: 0.7 % (ref 0–1.9)
BASOPHILS NFR BLD: 0.9 % (ref 0–1.9)
BILIRUB SERPL-MCNC: 0.4 MG/DL (ref 0.1–1)
BLD GP AB SCN CELLS X3 SERPL QL: NORMAL
BUN SERPL-MCNC: 26 MG/DL (ref 6–20)
CALCIUM SERPL-MCNC: 8.9 MG/DL (ref 8.7–10.5)
CHLORIDE SERPL-SCNC: 108 MMOL/L (ref 95–110)
CO2 SERPL-SCNC: 24 MMOL/L (ref 23–29)
CREAT SERPL-MCNC: 1 MG/DL (ref 0.5–1.4)
DIFFERENTIAL METHOD: ABNORMAL
DIFFERENTIAL METHOD: ABNORMAL
EOSINOPHIL # BLD AUTO: 0.3 K/UL (ref 0–0.5)
EOSINOPHIL # BLD AUTO: 0.4 K/UL (ref 0–0.5)
EOSINOPHIL NFR BLD: 2.8 % (ref 0–8)
EOSINOPHIL NFR BLD: 5.5 % (ref 0–8)
ERYTHROCYTE [DISTWIDTH] IN BLOOD BY AUTOMATED COUNT: 14.4 % (ref 11.5–14.5)
ERYTHROCYTE [DISTWIDTH] IN BLOOD BY AUTOMATED COUNT: 14.4 % (ref 11.5–14.5)
EST. GFR  (NO RACE VARIABLE): >60 ML/MIN/1.73 M^2
GLUCOSE SERPL-MCNC: 136 MG/DL (ref 70–110)
HCT VFR BLD AUTO: 32.8 % (ref 40–54)
HCT VFR BLD AUTO: 38 % (ref 40–54)
HGB BLD-MCNC: 10.8 G/DL (ref 14–18)
HGB BLD-MCNC: 12.3 G/DL (ref 14–18)
IMM GRANULOCYTES # BLD AUTO: 0.03 K/UL (ref 0–0.04)
IMM GRANULOCYTES # BLD AUTO: 0.05 K/UL (ref 0–0.04)
IMM GRANULOCYTES NFR BLD AUTO: 0.4 % (ref 0–0.5)
IMM GRANULOCYTES NFR BLD AUTO: 0.5 % (ref 0–0.5)
LYMPHOCYTES # BLD AUTO: 1.2 K/UL (ref 1–4.8)
LYMPHOCYTES # BLD AUTO: 1.3 K/UL (ref 1–4.8)
LYMPHOCYTES NFR BLD: 11.8 % (ref 18–48)
LYMPHOCYTES NFR BLD: 19.4 % (ref 18–48)
MCH RBC QN AUTO: 28.1 PG (ref 27–31)
MCH RBC QN AUTO: 28.5 PG (ref 27–31)
MCHC RBC AUTO-ENTMCNC: 32.4 G/DL (ref 32–36)
MCHC RBC AUTO-ENTMCNC: 32.9 G/DL (ref 32–36)
MCV RBC AUTO: 87 FL (ref 82–98)
MCV RBC AUTO: 87 FL (ref 82–98)
MONOCYTES # BLD AUTO: 0.7 K/UL (ref 0.3–1)
MONOCYTES # BLD AUTO: 0.7 K/UL (ref 0.3–1)
MONOCYTES NFR BLD: 10.4 % (ref 4–15)
MONOCYTES NFR BLD: 6.5 % (ref 4–15)
NEUTROPHILS # BLD AUTO: 4.3 K/UL (ref 1.8–7.7)
NEUTROPHILS # BLD AUTO: 8.1 K/UL (ref 1.8–7.7)
NEUTROPHILS NFR BLD: 63.4 % (ref 38–73)
NEUTROPHILS NFR BLD: 77.7 % (ref 38–73)
NRBC BLD-RTO: 0 /100 WBC
NRBC BLD-RTO: 0 /100 WBC
PLATELET # BLD AUTO: 247 K/UL (ref 150–450)
PLATELET # BLD AUTO: 285 K/UL (ref 150–450)
PMV BLD AUTO: 9.8 FL (ref 9.2–12.9)
PMV BLD AUTO: 9.9 FL (ref 9.2–12.9)
POTASSIUM SERPL-SCNC: 3.8 MMOL/L (ref 3.5–5.1)
PROT SERPL-MCNC: 6.1 G/DL (ref 6–8.4)
RBC # BLD AUTO: 3.79 M/UL (ref 4.6–6.2)
RBC # BLD AUTO: 4.37 M/UL (ref 4.6–6.2)
RH BLD: NORMAL
SODIUM SERPL-SCNC: 141 MMOL/L (ref 136–145)
SPECIMEN OUTDATE: NORMAL
WBC # BLD AUTO: 10.36 K/UL (ref 3.9–12.7)
WBC # BLD AUTO: 6.76 K/UL (ref 3.9–12.7)

## 2023-10-23 PROCEDURE — 86850 RBC ANTIBODY SCREEN: CPT | Performed by: NURSE PRACTITIONER

## 2023-10-23 PROCEDURE — 63600175 PHARM REV CODE 636 W HCPCS

## 2023-10-23 PROCEDURE — C9113 INJ PANTOPRAZOLE SODIUM, VIA: HCPCS

## 2023-10-23 PROCEDURE — 96374 THER/PROPH/DIAG INJ IV PUSH: CPT

## 2023-10-23 PROCEDURE — 93005 ELECTROCARDIOGRAM TRACING: CPT

## 2023-10-23 PROCEDURE — 86901 BLOOD TYPING SEROLOGIC RH(D): CPT | Performed by: NURSE PRACTITIONER

## 2023-10-23 PROCEDURE — 93010 ELECTROCARDIOGRAM REPORT: CPT | Mod: ,,, | Performed by: GENERAL PRACTICE

## 2023-10-23 PROCEDURE — 36415 COLL VENOUS BLD VENIPUNCTURE: CPT

## 2023-10-23 PROCEDURE — 99285 EMERGENCY DEPT VISIT HI MDM: CPT

## 2023-10-23 PROCEDURE — 85025 COMPLETE CBC W/AUTO DIFF WBC: CPT

## 2023-10-23 PROCEDURE — 85025 COMPLETE CBC W/AUTO DIFF WBC: CPT | Mod: 91 | Performed by: NURSE PRACTITIONER

## 2023-10-23 PROCEDURE — 36415 COLL VENOUS BLD VENIPUNCTURE: CPT | Performed by: NURSE PRACTITIONER

## 2023-10-23 PROCEDURE — 93010 EKG 12-LEAD: ICD-10-PCS | Mod: ,,, | Performed by: GENERAL PRACTICE

## 2023-10-23 PROCEDURE — G0378 HOSPITAL OBSERVATION PER HR: HCPCS

## 2023-10-23 PROCEDURE — 86900 BLOOD TYPING SEROLOGIC ABO: CPT | Performed by: NURSE PRACTITIONER

## 2023-10-23 PROCEDURE — 80053 COMPREHEN METABOLIC PANEL: CPT | Performed by: NURSE PRACTITIONER

## 2023-10-23 PROCEDURE — 25000003 PHARM REV CODE 250: Performed by: EMERGENCY MEDICINE

## 2023-10-23 PROCEDURE — 96361 HYDRATE IV INFUSION ADD-ON: CPT

## 2023-10-23 RX ORDER — FOSINOPRIL SODIUM AND HYDROCHLOROTHIAZIDE 20; 12.5 MG/1; MG/1
1 TABLET ORAL
Status: ON HOLD | COMMUNITY
Start: 2023-08-08 | End: 2023-10-24

## 2023-10-23 RX ORDER — PANTOPRAZOLE SODIUM 40 MG/10ML
40 INJECTION, POWDER, LYOPHILIZED, FOR SOLUTION INTRAVENOUS 2 TIMES DAILY
Status: DISCONTINUED | OUTPATIENT
Start: 2023-10-23 | End: 2023-10-24 | Stop reason: HOSPADM

## 2023-10-23 RX ORDER — POLYETHYLENE GLYCOL 3350, SODIUM CHLORIDE, SODIUM BICARBONATE, POTASSIUM CHLORIDE 420; 11.2; 5.72; 1.48 G/4L; G/4L; G/4L; G/4L
4000 POWDER, FOR SOLUTION ORAL ONCE
Status: COMPLETED | OUTPATIENT
Start: 2023-10-24 | End: 2023-10-24

## 2023-10-23 RX ORDER — MELOXICAM 15 MG/1
15 TABLET ORAL DAILY PRN
COMMUNITY
Start: 2023-10-19

## 2023-10-23 RX ORDER — ATORVASTATIN CALCIUM 80 MG/1
80 TABLET, FILM COATED ORAL
COMMUNITY
Start: 2023-06-15 | End: 2023-10-23

## 2023-10-23 RX ORDER — BUPROPION HYDROCHLORIDE 150 MG/1
150 TABLET ORAL DAILY
Status: DISCONTINUED | OUTPATIENT
Start: 2023-10-24 | End: 2023-10-24 | Stop reason: HOSPADM

## 2023-10-23 RX ORDER — ACETAMINOPHEN 325 MG/1
650 TABLET ORAL EVERY 6 HOURS PRN
Status: DISCONTINUED | OUTPATIENT
Start: 2023-10-23 | End: 2023-10-24 | Stop reason: HOSPADM

## 2023-10-23 RX ADMIN — PANTOPRAZOLE SODIUM 40 MG: 40 INJECTION, POWDER, LYOPHILIZED, FOR SOLUTION INTRAVENOUS at 11:10

## 2023-10-23 RX ADMIN — SODIUM CHLORIDE 1000 ML: 9 INJECTION, SOLUTION INTRAVENOUS at 09:10

## 2023-10-24 ENCOUNTER — ANESTHESIA EVENT (OUTPATIENT)
Dept: ENDOSCOPY | Facility: HOSPITAL | Age: 53
End: 2023-10-24
Payer: COMMERCIAL

## 2023-10-24 ENCOUNTER — ANESTHESIA (OUTPATIENT)
Dept: ENDOSCOPY | Facility: HOSPITAL | Age: 53
End: 2023-10-24
Payer: COMMERCIAL

## 2023-10-24 VITALS
HEART RATE: 87 BPM | DIASTOLIC BLOOD PRESSURE: 67 MMHG | BODY MASS INDEX: 28.57 KG/M2 | SYSTOLIC BLOOD PRESSURE: 117 MMHG | RESPIRATION RATE: 20 BRPM | HEIGHT: 69 IN | TEMPERATURE: 98 F | OXYGEN SATURATION: 98 % | WEIGHT: 192.88 LBS

## 2023-10-24 LAB
ANION GAP SERPL CALC-SCNC: 8 MMOL/L (ref 8–16)
BASOPHILS # BLD AUTO: 0.05 K/UL (ref 0–0.2)
BASOPHILS # BLD AUTO: 0.05 K/UL (ref 0–0.2)
BASOPHILS NFR BLD: 0.7 % (ref 0–1.9)
BASOPHILS NFR BLD: 0.8 % (ref 0–1.9)
BUN SERPL-MCNC: 19 MG/DL (ref 6–20)
CALCIUM SERPL-MCNC: 8.1 MG/DL (ref 8.7–10.5)
CHLORIDE SERPL-SCNC: 108 MMOL/L (ref 95–110)
CO2 SERPL-SCNC: 23 MMOL/L (ref 23–29)
CREAT SERPL-MCNC: 0.8 MG/DL (ref 0.5–1.4)
DIFFERENTIAL METHOD: ABNORMAL
DIFFERENTIAL METHOD: ABNORMAL
EOSINOPHIL # BLD AUTO: 0.3 K/UL (ref 0–0.5)
EOSINOPHIL # BLD AUTO: 0.3 K/UL (ref 0–0.5)
EOSINOPHIL NFR BLD: 4.1 % (ref 0–8)
EOSINOPHIL NFR BLD: 4.7 % (ref 0–8)
ERYTHROCYTE [DISTWIDTH] IN BLOOD BY AUTOMATED COUNT: 14.3 % (ref 11.5–14.5)
ERYTHROCYTE [DISTWIDTH] IN BLOOD BY AUTOMATED COUNT: 14.4 % (ref 11.5–14.5)
EST. GFR  (NO RACE VARIABLE): >60 ML/MIN/1.73 M^2
FERRITIN SERPL-MCNC: 23 NG/ML (ref 20–300)
GLUCOSE SERPL-MCNC: 103 MG/DL (ref 70–110)
HCT VFR BLD AUTO: 30.1 % (ref 40–54)
HCT VFR BLD AUTO: 32.5 % (ref 40–54)
HGB BLD-MCNC: 10.6 G/DL (ref 14–18)
HGB BLD-MCNC: 9.9 G/DL (ref 14–18)
IMM GRANULOCYTES # BLD AUTO: 0.02 K/UL (ref 0–0.04)
IMM GRANULOCYTES # BLD AUTO: 0.03 K/UL (ref 0–0.04)
IMM GRANULOCYTES NFR BLD AUTO: 0.3 % (ref 0–0.5)
IMM GRANULOCYTES NFR BLD AUTO: 0.4 % (ref 0–0.5)
IRON SERPL-MCNC: 96 UG/DL (ref 45–160)
LYMPHOCYTES # BLD AUTO: 1.3 K/UL (ref 1–4.8)
LYMPHOCYTES # BLD AUTO: 1.4 K/UL (ref 1–4.8)
LYMPHOCYTES NFR BLD: 18.8 % (ref 18–48)
LYMPHOCYTES NFR BLD: 21.3 % (ref 18–48)
MAGNESIUM SERPL-MCNC: 1.5 MG/DL (ref 1.6–2.6)
MCH RBC QN AUTO: 28.3 PG (ref 27–31)
MCH RBC QN AUTO: 28.4 PG (ref 27–31)
MCHC RBC AUTO-ENTMCNC: 32.6 G/DL (ref 32–36)
MCHC RBC AUTO-ENTMCNC: 32.9 G/DL (ref 32–36)
MCV RBC AUTO: 87 FL (ref 82–98)
MCV RBC AUTO: 87 FL (ref 82–98)
MONOCYTES # BLD AUTO: 0.6 K/UL (ref 0.3–1)
MONOCYTES # BLD AUTO: 0.7 K/UL (ref 0.3–1)
MONOCYTES NFR BLD: 8.4 % (ref 4–15)
MONOCYTES NFR BLD: 9.9 % (ref 4–15)
NEUTROPHILS # BLD AUTO: 4.3 K/UL (ref 1.8–7.7)
NEUTROPHILS # BLD AUTO: 4.5 K/UL (ref 1.8–7.7)
NEUTROPHILS NFR BLD: 65.1 % (ref 38–73)
NEUTROPHILS NFR BLD: 65.5 % (ref 38–73)
NRBC BLD-RTO: 0 /100 WBC
NRBC BLD-RTO: 0 /100 WBC
PLATELET # BLD AUTO: 209 K/UL (ref 150–450)
PLATELET # BLD AUTO: 222 K/UL (ref 150–450)
PMV BLD AUTO: 9.3 FL (ref 9.2–12.9)
PMV BLD AUTO: 9.9 FL (ref 9.2–12.9)
POTASSIUM SERPL-SCNC: 3.4 MMOL/L (ref 3.5–5.1)
RBC # BLD AUTO: 3.48 M/UL (ref 4.6–6.2)
RBC # BLD AUTO: 3.74 M/UL (ref 4.6–6.2)
SATURATED IRON: 29 % (ref 20–50)
SODIUM SERPL-SCNC: 139 MMOL/L (ref 136–145)
TOTAL IRON BINDING CAPACITY: 336 UG/DL (ref 250–450)
TRANSFERRIN SERPL-MCNC: 240 MG/DL (ref 200–375)
VIT B12 SERPL-MCNC: 959 PG/ML (ref 210–950)
WBC # BLD AUTO: 6.54 K/UL (ref 3.9–12.7)
WBC # BLD AUTO: 6.86 K/UL (ref 3.9–12.7)

## 2023-10-24 PROCEDURE — D9220A PRA ANESTHESIA: Mod: CRNA,,, | Performed by: NURSE ANESTHETIST, CERTIFIED REGISTERED

## 2023-10-24 PROCEDURE — 37000009 HC ANESTHESIA EA ADD 15 MINS: Performed by: INTERNAL MEDICINE

## 2023-10-24 PROCEDURE — 45378 PR COLONOSCOPY,DIAGNOSTIC: ICD-10-PCS | Mod: ,,, | Performed by: INTERNAL MEDICINE

## 2023-10-24 PROCEDURE — 45378 DIAGNOSTIC COLONOSCOPY: CPT | Performed by: INTERNAL MEDICINE

## 2023-10-24 PROCEDURE — D9220A PRA ANESTHESIA: ICD-10-PCS | Mod: ANES,,, | Performed by: ANESTHESIOLOGY

## 2023-10-24 PROCEDURE — 83540 ASSAY OF IRON: CPT

## 2023-10-24 PROCEDURE — G0378 HOSPITAL OBSERVATION PER HR: HCPCS

## 2023-10-24 PROCEDURE — 25000003 PHARM REV CODE 250: Performed by: NURSE ANESTHETIST, CERTIFIED REGISTERED

## 2023-10-24 PROCEDURE — 82728 ASSAY OF FERRITIN: CPT

## 2023-10-24 PROCEDURE — 36415 COLL VENOUS BLD VENIPUNCTURE: CPT

## 2023-10-24 PROCEDURE — 85025 COMPLETE CBC W/AUTO DIFF WBC: CPT

## 2023-10-24 PROCEDURE — D9220A PRA ANESTHESIA: ICD-10-PCS | Mod: CRNA,,, | Performed by: NURSE ANESTHETIST, CERTIFIED REGISTERED

## 2023-10-24 PROCEDURE — 99214 PR OFFICE/OUTPT VISIT, EST, LEVL IV, 30-39 MIN: ICD-10-PCS | Mod: ,,, | Performed by: INTERNAL MEDICINE

## 2023-10-24 PROCEDURE — 63600175 PHARM REV CODE 636 W HCPCS: Performed by: NURSE ANESTHETIST, CERTIFIED REGISTERED

## 2023-10-24 PROCEDURE — 94760 N-INVAS EAR/PLS OXIMETRY 1: CPT | Mod: XB

## 2023-10-24 PROCEDURE — D9220A PRA ANESTHESIA: Mod: ANES,,, | Performed by: ANESTHESIOLOGY

## 2023-10-24 PROCEDURE — 99214 OFFICE O/P EST MOD 30 MIN: CPT | Mod: ,,, | Performed by: INTERNAL MEDICINE

## 2023-10-24 PROCEDURE — 83735 ASSAY OF MAGNESIUM: CPT

## 2023-10-24 PROCEDURE — 25000003 PHARM REV CODE 250

## 2023-10-24 PROCEDURE — 37000008 HC ANESTHESIA 1ST 15 MINUTES: Performed by: INTERNAL MEDICINE

## 2023-10-24 PROCEDURE — 80048 BASIC METABOLIC PNL TOTAL CA: CPT

## 2023-10-24 PROCEDURE — 45378 DIAGNOSTIC COLONOSCOPY: CPT | Mod: ,,, | Performed by: INTERNAL MEDICINE

## 2023-10-24 PROCEDURE — 84466 ASSAY OF TRANSFERRIN: CPT

## 2023-10-24 PROCEDURE — 94761 N-INVAS EAR/PLS OXIMETRY MLT: CPT

## 2023-10-24 PROCEDURE — 82607 VITAMIN B-12: CPT

## 2023-10-24 RX ORDER — LIDOCAINE HYDROCHLORIDE 20 MG/ML
INJECTION INTRAVENOUS
Status: DISCONTINUED | OUTPATIENT
Start: 2023-10-24 | End: 2023-10-24

## 2023-10-24 RX ORDER — PROPOFOL 10 MG/ML
VIAL (ML) INTRAVENOUS
Status: DISCONTINUED | OUTPATIENT
Start: 2023-10-24 | End: 2023-10-24

## 2023-10-24 RX ADMIN — POLYETHYLENE GLYCOL 3350, SODIUM CHLORIDE, SODIUM BICARBONATE AND POTASSIUM CHLORIDE WITH LEMON FLAVOR 4000 ML: 420; 11.2; 5.72; 1.48 POWDER, FOR SOLUTION ORAL at 12:10

## 2023-10-24 RX ADMIN — PROPOFOL 50 MG: 10 INJECTION, EMULSION INTRAVENOUS at 01:10

## 2023-10-24 RX ADMIN — LIDOCAINE HYDROCHLORIDE 100 MG: 20 INJECTION, SOLUTION INTRAVENOUS at 01:10

## 2023-10-24 RX ADMIN — GLYCOPYRROLATE 0.2 MG: 0.2 INJECTION, SOLUTION INTRAMUSCULAR; INTRAVITREAL at 01:10

## 2023-10-24 RX ADMIN — PROPOFOL 100 MG: 10 INJECTION, EMULSION INTRAVENOUS at 01:10

## 2023-10-24 NOTE — PLAN OF CARE
Problem: Adult Inpatient Plan of Care  Goal: Plan of Care Review  Outcome: Ongoing, Progressing  Goal: Patient-Specific Goal (Individualized)  Outcome: Ongoing, Progressing  Goal: Absence of Hospital-Acquired Illness or Injury  Outcome: Ongoing, Progressing  Goal: Optimal Comfort and Wellbeing  Outcome: Ongoing, Progressing  Goal: Readiness for Transition of Care  Outcome: Ongoing, Progressing     Problem: Adjustment to Illness (Gastrointestinal Bleeding)  Goal: Optimal Coping with Acute Illness  Outcome: Ongoing, Progressing

## 2023-10-24 NOTE — NURSING
Discharged instructions given to both patient and wife, both verbalized understanding.  Educated on importance that if bleeding returns, GI has recommending to return to ED for STAT CTA with GIB protocol. PIV removed with catheter intact. Telemetry removed and returned to monitor room. Patient escorted to lobby.

## 2023-10-24 NOTE — HOSPITAL COURSE
Patient was admitted with GI bleeding.  He was placed on GI bleeding pathway and GI was consulted.  Patient was monitored closely during his stay.  His labs and vital signs were trended.  He underwent colonoscopy revealing diverticula and nonbleeding internal hemorrhoids.  He was cleared from GI perspective for discharge.  Discharge instructions as well as return precautions were discussed with patient with good understanding.  Patient was seen and evaluated on day of discharge and deemed appropriate.

## 2023-10-24 NOTE — SUBJECTIVE & OBJECTIVE
Past Medical History:   Diagnosis Date    Anxiety     COVID-19     Diabetes mellitus     GERD (gastroesophageal reflux disease)     Hypertension        Past Surgical History:   Procedure Laterality Date    BACK SURGERY      COLONOSCOPY N/A 8/25/2022    Procedure: COLONOSCOPY;  Surgeon: Bin Wallaec MD;  Location: [facility] ENDO;  Service: Endoscopy;  Laterality: N/A;    COLONOSCOPY N/A 8/30/2022    Procedure: COLONOSCOPY;  Surgeon: Carie Cabrera MD;  Location: [facility] ENDO;  Service: Endoscopy;  Laterality: N/A;    ESOPHAGOGASTRODUODENOSCOPY N/A 8/29/2022    Procedure: EGD (ESOPHAGOGASTRODUODENOSCOPY)(with push);  Surgeon: Hayder Macias MD;  Location: [facility] ENDO;  Service: Endoscopy;  Laterality: N/A;    ESOPHAGOGASTRODUODENOSCOPY N/A 8/25/2022    Procedure: EGD (ESOPHAGOGASTRODUODENOSCOPY);  Surgeon: Bin Wallace MD;  Location: [facility] ENDO;  Service: Endoscopy;  Laterality: N/A;    gastric sleeve  04/2022    right knee arthroscope  1986       Review of patient's allergies indicates:   Allergen Reactions    Humalog audie kwikpen u-100  [insulin lispro] Hives, Itching, Rash and Swelling       No current facility-administered medications on file prior to encounter.     Current Outpatient Medications on File Prior to Encounter   Medication Sig    buPROPion (WELLBUTRIN XL) 150 MG TB24 tablet Take 150 mg by mouth once daily.    calcium citrate (CALCITRATE) 200 mg (950 mg) tablet Take 1 tablet by mouth every evening.    meloxicam (MOBIC) 15 MG tablet Take 15 mg by mouth daily as needed for Pain.    metFORMIN (GLUCOPHAGE) 1000 MG tablet Take 1 tablet (1,000 mg total) by mouth 2 (two) times daily with meals.    metoprolol succinate (TOPROL-XL) 100 MG 24 hr tablet Take 50 mg by mouth 2 (two) times daily.    multivitamin capsule Take 1 capsule by mouth every evening. bariatric    omeprazole (PRILOSEC) 20 MG capsule Take 20 mg by mouth once daily.    oxyCODONE-acetaminophen (PERCOCET)  mg per tablet Take 1  "tablet by mouth 2 (two) times daily as needed for Pain.    vitamin D (VITAMIN D3) 1000 units Tab Take 1,000 Units by mouth 2 (two) times a day.    BD ULTRA-FINE SHELDON PEN NEEDLE 32 gauge x 5/32" Ndle 1 pen by Misc.(Non-Drug; Combo Route) route 3 (three) times daily.     fosinopriL-hydrochlorothiazide (MONOPRIL-HCT) 20-12.5 mg per tablet Take 1 tablet by mouth.    FREESTYLE LOREN 14 DAY SENSOR Kit USE AS DIRECTED    PRODIGY NO CODING Strp 1 strip by Misc.(Non-Drug; Combo Route) route as needed.     PRODIGY TWIST TOP LANCET 28 gauge Misc 1 lancet by Misc.(Non-Drug; Combo Route) route 3 (three) times daily as needed.     [DISCONTINUED] atorvastatin (LIPITOR) 80 MG tablet Take 80 mg by mouth.    [DISCONTINUED] meclizine (ANTIVERT) 50 MG tablet Take 25 mg by mouth as needed.     Family History       Problem Relation (Age of Onset)    Arthritis Mother    Cancer Maternal Aunt    Diabetes Mother    Heart disease Mother    Hyperlipidemia Father    Hypertension Father          Tobacco Use    Smoking status: Never    Smokeless tobacco: Never   Substance and Sexual Activity    Alcohol use: Yes     Alcohol/week: 2.0 standard drinks of alcohol     Types: 2 Cans of beer per week     Comment: occassional    Drug use: No    Sexual activity: Yes     Partners: Female     Review of Systems   Constitutional:  Negative for fever.   Respiratory:  Negative for cough and shortness of breath.    Cardiovascular:  Negative for chest pain and palpitations.   Gastrointestinal:  Positive for abdominal pain (lower) and blood in stool. Negative for nausea.   Genitourinary:  Negative for difficulty urinating, dysuria and hematuria.   Neurological:  Positive for light-headedness.     Objective:     Vital Signs (Most Recent):  Temp: 98.5 °F (36.9 °C) (10/23/23 1859)  Pulse: 78 (10/23/23 2232)  Resp: 18 (10/23/23 2218)  BP: 107/66 (10/23/23 2232)  SpO2: 96 % (10/23/23 2232) Vital Signs (24h Range):  Temp:  [98.5 °F (36.9 °C)] 98.5 °F (36.9 °C)  Pulse:  " [78-84] 78  Resp:  [18-19] 18  SpO2:  [94 %-98 %] 96 %  BP: ()/(63-72) 107/66     Weight: 88.5 kg (195 lb)  Body mass index is 28.8 kg/m².     Physical Exam  Vitals and nursing note reviewed.   Constitutional:       General: He is not in acute distress.     Appearance: Normal appearance. He is obese.   HENT:      Head: Normocephalic and atraumatic.      Mouth/Throat:      Mouth: Mucous membranes are moist.      Pharynx: Oropharynx is clear.   Eyes:      Extraocular Movements: Extraocular movements intact.      Pupils: Pupils are equal, round, and reactive to light.   Cardiovascular:      Rate and Rhythm: Normal rate and regular rhythm.      Pulses: Normal pulses.      Heart sounds: Normal heart sounds.   Pulmonary:      Effort: Pulmonary effort is normal.      Breath sounds: Normal breath sounds.   Abdominal:      General: Abdomen is flat. Bowel sounds are normal.      Palpations: Abdomen is soft.      Tenderness: There is no abdominal tenderness. There is no guarding or rebound.   Musculoskeletal:      Cervical back: Normal range of motion and neck supple.   Skin:     General: Skin is warm.      Capillary Refill: Capillary refill takes less than 2 seconds.   Neurological:      General: No focal deficit present.      Mental Status: He is alert and oriented to person, place, and time. Mental status is at baseline.   Psychiatric:         Mood and Affect: Mood normal.         Behavior: Behavior normal.              CRANIAL NERVES     CN III, IV, VI   Pupils are equal, round, and reactive to light.       Significant Labs: All pertinent labs within the past 24 hours have been reviewed.  CBC:   Recent Labs   Lab 10/23/23  1934   WBC 6.76   HGB 12.3*   HCT 38.0*        CMP:   Recent Labs   Lab 10/23/23  1934      K 3.8      CO2 24   *   BUN 26*   CREATININE 1.0   CALCIUM 8.9   PROT 6.1   ALBUMIN 3.5   BILITOT 0.4   ALKPHOS 54*   AST 11   ALT 10   ANIONGAP 9       Significant Imaging: I have  reviewed all pertinent imaging results/findings within the past 24 hours.

## 2023-10-24 NOTE — NURSING
Awake alert and oriented x4. Moves all extremities. VSS. 97% RA. 115/69, 79 HR. 18g to r/t a/c with IVF infused, site free of s/s of infiltration. (Yesenia) SO at bedside. Reviewed room/unit assignment. Questions answered and encouraged.

## 2023-10-24 NOTE — PROVATION PATIENT INSTRUCTIONS
Discharge Summary/Instructions after an Endoscopic Procedure  Patient Name: Jules Floyd  Patient MRN: [id number]  Patient YOB: 1970  Tuesday, October 24, 2023  Bin Shah MD  Dear patient,  As a result of recent federal legislation (The Federal Cures Act), you may   receive lab or pathology results from your procedure in your [facility]   account before your physician is able to contact you. Your physician or   their representative will relay the results to you with their   recommendations at their soonest availability.  Thank you,  RESTRICTIONS:  During your procedure today, you received medications for sedation.  These   medications may affect your judgment, balance and coordination.  Therefore,   for 24 hours, you have the following restrictions:   - DO NOT drive a car, operate machinery, make legal/financial decisions,   sign important papers or drink alcohol.    ACTIVITY:  Today: no heavy lifting, straining or running due to procedural   sedation/anesthesia.  The following day: return to full activity including work.  DIET:  Eat and drink normally unless instructed otherwise.     TREATMENT FOR COMMON SIDE EFFECTS:  - Mild abdominal pain, nausea, belching, bloating or excessive gas:  rest,   eat lightly and use a heating pad.  - Sore Throat: treat with throat lozenges and/or gargle with warm salt   water.  - Because air was used during the procedure, expelling large amounts of air   from your rectum or belching is normal.  - If a bowel prep was taken, you may not have a bowel movement for 1-3 days.    This is normal.  SYMPTOMS TO WATCH FOR AND REPORT TO YOUR PHYSICIAN:  1. Abdominal pain or bloating, other than gas cramps.  2. Chest pain.  3. Back pain.  4. Signs of infection such as: chills or fever occurring within 24 hours   after the procedure.  5. Rectal bleeding, which would show as bright red, maroon, or black stools.   (A tablespoon of blood from the rectum is not serious, especially if    hemorrhoids are present.)  6. Vomiting.  7. Weakness or dizziness.  GO DIRECTLY TO THE NEAREST EMERGENCY ROOM IF YOU HAVE ANY OF THE FOLLOWING:      Difficulty breathing              Chills and/or fever over 101 F   Persistent vomiting and/or vomiting blood   Severe abdominal pain   Severe chest pain   Black, tarry stools   Bleeding- more than one tablespoon   Any other symptom or condition that you feel may need urgent attention  Your doctor recommends these additional instructions:  If any biopsies were taken, your doctors clinic will contact you in 1 to 2   weeks with any results.  - Return patient to [facility] for ongoing care.   - High fiber diet.   - Continue present medications.   - Repeat colonoscopy in 5 years for surveillance.   - Return to GI clinic after studies are complete.  For questions, problems or results please call your physician - Bin Shah MD at Work:  [phone].  [facility], EMERGENCY ROOM PHONE NUMBER: [phone]  IF A COMPLICATION OR EMERGENCY SITUATION ARISES AND YOU ARE UNABLE TO REACH   YOUR PHYSICIAN - GO DIRECTLY TO THE EMERGENCY ROOM.  Bin Shah MD  10/24/2023 1:52:58 PM  This report has been verified and signed electronically.  Dear patient,  As a result of recent federal legislation (The Federal Cures Act), you may   receive lab or pathology results from your procedure in your [facility]   account before your physician is able to contact you. Your physician or   their representative will relay the results to you with their   recommendations at their soonest availability.  Thank you,  PROVATION

## 2023-10-24 NOTE — PLAN OF CARE
Pt cleared for discharge from case management  Called PCP's office [facility] [phone], spoke to DIANE Telles not allowed to make appt, pt has to call an schedule HFU appt.     10/24/23 1528   Final Note   Assessment Type Final Discharge Note   Anticipated Discharge Disposition Home   What phone number can be called within the next 1-3 days to see how you are doing after discharge?   ([phone])

## 2023-10-24 NOTE — TRANSFER OF CARE
"Anesthesia Transfer of Care Note    Patient: Jules Floyd Jr.    Procedure(s) Performed: Procedure(s) (LRB):  COLONOSCOPY (N/A)    Patient location: PACU    Anesthesia Type: general    Transport from OR: Transported from OR on 2-3 L/min O2 by NC with adequate spontaneous ventilation    Post pain: adequate analgesia    Post assessment: no apparent anesthetic complications and tolerated procedure well    Post vital signs: stable    Level of consciousness: sedated and responds to stimulation    Nausea/Vomiting: no nausea/vomiting    Complications: none    Transfer of care protocol was followed      Last vitals:   Visit Vitals  /73 (BP Location: Left arm, Patient Position: Lying)   Pulse 81   Temp 36.7 °C (98.1 °F) (Skin)   Resp 15   Ht 5' 9" (1.753 m)   Wt 87.5 kg (193 lb)   SpO2 97%   BMI 28.50 kg/m²     "

## 2023-10-24 NOTE — PLAN OF CARE
Problem: Adult Inpatient Plan of Care  Goal: Plan of Care Review  Outcome: Ongoing, Progressing  Goal: Patient-Specific Goal (Individualized)  Outcome: Ongoing, Progressing  Goal: Absence of Hospital-Acquired Illness or Injury  Outcome: Ongoing, Progressing  Goal: Optimal Comfort and Wellbeing  Outcome: Ongoing, Progressing  Goal: Readiness for Transition of Care  Outcome: Ongoing, Progressing     Problem: Adjustment to Illness (Gastrointestinal Bleeding)  Goal: Optimal Coping with Acute Illness  Outcome: Ongoing, Progressing     Problem: Bleeding (Gastrointestinal Bleeding)  Goal: Hemostasis  Outcome: Ongoing, Progressing     Problem: Diabetes Comorbidity  Goal: Blood Glucose Level Within Targeted Range  Outcome: Ongoing, Progressing     Problem: Fall Injury Risk  Goal: Absence of Fall and Fall-Related Injury  Outcome: Ongoing, Progressing

## 2023-10-24 NOTE — ASSESSMENT & PLAN NOTE
Patient's anemia is currently controlled. Has not received any PRBCs to date.. Etiology likely d/t GIB; anemia labs ordered  Current CBC reviewed-   Lab Results   Component Value Date    HGB 12.3 (L) 10/23/2023    HCT 38.0 (L) 10/23/2023     Monitor serial CBC and transfuse if patient becomes hemodynamically unstable, symptomatic or H/H drops below 7/21.

## 2023-10-24 NOTE — ANESTHESIA PREPROCEDURE EVALUATION
10/24/2023  Jules Floyd Jr. is a 53 y.o., male.      Pre-op Assessment    I have reviewed the Patient Summary Reports.     I have reviewed the Nursing Notes. I have reviewed the NPO Status.   I have reviewed the Medications.     Review of Systems  Anesthesia Hx:  No problems with previous Anesthesia    Social:  Non-Smoker    Cardiovascular:  Cardiovascular Normal Hypertension, well controlled  hyperlipidemia    Pulmonary:  Pulmonary Normal    Renal/:  Renal/ Normal     Hepatic/GI:   GERD, well controlled BRBPR admitted   Neurological:  Neurology Normal    Endocrine:  Endocrine Normal Diabetes, well controlled    Psych:   anxiety          Physical Exam  General: Well nourished    Airway:  Mallampati: III / II  Mouth Opening: Normal  TM Distance: Normal  Neck ROM: Normal ROM    Dental:  Intact    Chest/Lungs:  Clear to auscultation, Normal Respiratory Rate    Heart:  Rate: Normal  Rhythm: Regular Rhythm        Anesthesia Plan  Type of Anesthesia, risks & benefits discussed:    Anesthesia Type: Gen Natural Airway  Intra-op Monitoring Plan: Standard ASA Monitors  Post Op Pain Control Plan: multimodal analgesia  Induction:  IV  Airway Plan: Direct, Video and Fiberoptic, Post-Induction  Informed Consent: Informed consent signed with the Patient and all parties understand the risks and agree with anesthesia plan.  All questions answered.   ASA Score: 3  Day of Surgery Review of History & Physical: H&P Update referred to the surgeon/provider.    Ready For Surgery From Anesthesia Perspective.     .

## 2023-10-24 NOTE — FIRST PROVIDER EVALUATION
Emergency Department TeleTriage Encounter Note      CHIEF COMPLAINT    Chief Complaint   Patient presents with    Rectal Bleeding     Bright red blood/clots during BMs.  Approximately 3 times tonight.  Happened last year as well.  Pt has photos.       VITAL SIGNS   Initial Vitals [10/23/23 1859]   BP Pulse Resp Temp SpO2   107/69 84 19 98.5 °F (36.9 °C) 96 %      MAP       --            ALLERGIES    Review of patient's allergies indicates:   Allergen Reactions    Humalog audie kwikpen u-100  [insulin lispro] Hives, Itching, Rash and Swelling       PROVIDER TRIAGE NOTE  This is a teletriage evaluation of a 53 y.o. male presenting to the ED complaining of BRBPR starting today. Reports abd pain. No vomiting, anticoagulant use, CP, or SOB.     Initial orders will be placed and care will be transferred to an alternate provider when patient is roomed for a full evaluation. Any additional orders and the final disposition will be determined by that provider.         ORDERS  Labs Reviewed - No data to display    ED Orders (720h ago, onward)      Start Ordered     Status Ordering Provider    10/23/23 1908 10/23/23 1908  Saline lock IV (18 ga. or larger)  Once         Ordered TOMAS TANNERIKA N.    10/23/23 1908 10/23/23 1908  2nd Saline lock IV (18 ga. or larger)  Once         Ordered MICHAETOMASDEANNA N.    10/23/23 1908 10/23/23 1908  NPO (Ice Chips)  Once         Ordered TOMAS TANNERIKA N.    10/23/23 1908 10/23/23 1908  CBC auto differential  STAT         Ordered TOMAS TANNERIKA N.    10/23/23 1908 10/23/23 1908  Comprehensive metabolic panel  STAT         Ordered GARO TANNERSSIKA N.    10/23/23 1908 10/23/23 1908  Type & Screen  STAT         Ordered DEANNA TANNER N.    10/23/23 1908 10/23/23 1908  Vital signs  Once         Ordered TOMAS TANNERIKA N.    10/23/23 1908 10/23/23 1908  Cardiac Monitoring - Adult  Continuous        Comments: Notify Physician If:    Ordered  DEANNA TANNER N.    10/23/23 1908 10/23/23 1908  Pulse Oximetry Continuous  Continuous         Ordered DEANNA TANNER N.    10/23/23 1908 10/23/23 1908  EKG 12-lead  Once         Ordered DEANNA TANNER              Virtual Visit Note: The provider triage portion of this emergency department evaluation and documentation was performed via [employer], a HIPAA-compliant telemedicine application, in concert with a tele-presenter in the room. A face to face patient evaluation with one of my colleagues will occur once the patient is placed in an emergency department room.      DISCLAIMER: This note was prepared with [employer] voice recognition transcription software. Garbled syntax, mangled pronouns, and other bizarre constructions may be attributed to that software system.

## 2023-10-24 NOTE — PROGRESS NOTES
Colonoscopy done.  No evidence of bleeding.  Diverticula and hemorrhoids noted.  Diverticular bleeding is the likely source of his presentation but bleeding has ceased.  Reviewed past EGD and push enteroscopy which were for similar presentation and these were also unrevealing.  Recommend resume diet.  OK to discharge home from GI standpoint.  If any bleeding recurs, recommend STAT CTA with GI bleeding protocol.  Will sign off.  Call for questions.   [de-identified] : [facility]\par    [facility] Imaging at [facility] Department of Radiology\par   Radiology Report\par \par \par Patient Name: JUANITO CHILDERS  Report Date: 27-Jul-2020 13:53.00 \par Patient ID: [id number] ([id number]), [id number] (EPI)  Accession No.: [id number] \par Patient Birth Date: 1941  Report Status: F \par Referring Physician: [id number] [facility]   Reason For Study: S06.5X  \par \par \par \par \par \par \par \par \par \par EXAM: CT BRAIN \par \par PROCEDURE DATE: 07/27/2020 \par \par \par \par \par INTERPRETATION: PROCEDURE: CT head without intravenous contrast \par \par INDICATIONS: Follow-up, left subdural hematoma. \par \par TECHNIQUE: Serial axial images were obtained from the skull base to the vertex without the use of intravenous contrast. Coronal and sagittal reconstructions were created. \par \par COMPARISON EXAMINATION: CT head from 7/3/2020 \par \par FINDINGS: \par \par VENTRICLES AND SULCI: Age-appropriate parenchymal volume loss. Sulci and ventricular size are consistent with age. \par INTRA-AXIAL: No new acute hemorrhage. No acute transcortical infarction. There is diminished attenuation within the periventricular and subcortical white matter consistent with chronic microangiopathic disease. \par EXTRA-AXIAL: Left frontotemporoparietal hypodense subdural fluid collection is noted with maximum width of approximately 1.2 cm measuring. Size of the subdural hematoma is essentially unchanged from the prior exam but with expected evolution in its density characteristics now demonstrating subacute fluid density characteristics. There is a minor degree of subjacent mass effect on the brain parenchyma sulcal effacement. There is no change in slight 3 mm left-to-right midline shift. There is less conspicuity of a thin, left tentorial subdural hemorrhage. No new acute hemorrhage is seen. \par VISUALIZED SINUSES: No air-fluid levels are identified. \par VISUALIZED MASTOIDS: Well aerated. \par CALVARIUM: No fracture. \par \par MISCELLANEOUS: None. \par \par IMPRESSION: \par \par Interval evolution of a right frontotemporoparietal subdural hematoma with subacute density characteristics currently and with minor subjacent mass effect and stable minimal left-to-right shift order of 3 mm. No new acute hemorrhage. \par \par \par \par \par \par KHALED AL TAWIL M.D.,RADIOLOGY RESIDENT \par This document has been electronically signed. \par FRANCISCO AYON M.D., ATTENDING RADIOLOGIST \par This document has been electronically signed. Jul 27 2020 1:53PM. [facility]\par    [facility] Department of Radiology\par   Radiology Report\par \par \par Patient Name: JUANITO CHILDERS  Report Date: 05-Aug-2020 17:35.00 \par Patient ID: [id number] ([id number]), [id number] (EPI)  Accession No.: [id number] \par Patient Birth Date: 1941  Report Status: F \par Referring Physician: [id number] MADDIE JIMENEZ   Reason For Study: change in mental status/ recent SDH  \par \par \par \par \par \par \par EXAM: CT BRAIN \par \par PROCEDURE DATE: 08/05/2020 \par \par \par \par INTERPRETATION: Clinical history/reason for exam: Altered mental status, recent subdural hematoma \par \par Technique: Multiple contiguous axial CT images of the head were obtained from the base of the skull to the vertex without the administration of intravenous contrast. Coronal and sagittal reformatted images were obtained. \par \par Comparison:CT head 7/27/2020 \par \par Findings: \par \par The ventricles and sulci are prominent consistent with parenchymal volume loss. \par \par Again demonstrated is a hypodense left cerebral convexity subdural fluid collection measuring up to 1.1 cm in diameter, mildly decreased from prior exam when it measured 1.2 cm in diameter. Essentially unchanged 3 mm left to right midline shift. No new extra-axial fluid collection or intracranial hemorrhage.. \par \par Gray-white matter differentiation is preserved. No acute demarcated territorial infarction. \par \par The bones of the calvarium are intact. \par \par The paranasal sinuses and bilateral mastoid complexes are well aerated. \par \par Impression: \par \par Since prior CT head 7/27/2020: Mild decrease in size of left cerebral convexity subdural hematoma. Stable mild mass effect and midline shift. \par \par No new intracranial hemorrhage or demarcated territorial infarction.. \par \par \par \par \par Thank you for the opportunity to participate in the care of this patient. \par \par \par \par SARINA TYLER M.D., ATTENDING RADIOLOGIST \par This document has been electronically signed. Aug 5 2020 5:35PM \par \par  \par \par \par \par \par \par \par  \par

## 2023-10-24 NOTE — ED NOTES
"Pt. Starting to feel light headed says " I feel like I'm about to pass out".  Pt. Just had another bright red bowel movement and feels fatigue now.  Pt. Blood pressure starting to decrease 87/63, MD notified.  "

## 2023-10-24 NOTE — NURSING
Pt back from Colonoscopy, No evidence of bleeding.  Diverticula and hemorrhoids noted.  Diverticular bleeding is the likely source of his presentation but bleeding has ceased. VSS, Lunch ordered from cafeteria.

## 2023-10-24 NOTE — HPI
Jules Floyd Jr. is a 53 y.o. y.o. male with a PMHx of GI bleed, anxiety, gastric sleeve, and diabetes who presented to the ED with blood in stools onset time 1300.  Patient reports multiple episodes of bright red blood in stools and passing large clots.  He also reports lower abdominal cramping and lightheadedness.  No syncope.  He denies chest pain, palpitations, shortness a breath, cough or headache.  Patient does have history of hypertension and hyperlipidemia but is not on any medications currently.  Reports history of a GI bleed approximately 1 year ago were colonoscopy and EGD were obtained but did not show definitive source of bleeding.  He denies NSAIDs or EtOH use.  ED workup was significant for anemia and blood in stools. Hospital Medicine was consulted for further workup and management of the patient.

## 2023-10-24 NOTE — PLAN OF CARE
[facility]/Surg  Initial Discharge Assessment       Primary Care Provider: Ina Ellsworth NP    Admission Diagnosis: GIB (gastrointestinal bleeding) [K92.2]    Admission Date: 10/23/2023  Expected Discharge Date: to be determined    Met with pt at bedside to complete discharge assessment, verified PCP, pharmacy and information on facesheet.  No HH, DME or dialysis.  Spouse will drive pt home. No needs.    Transition of Care Barriers: None    Payor: AETNA / Plan: AET[employer] CHOICE POS / Product Type: Commercial /     Extended Emergency Contact Information  Primary Emergency Contact: FOX HERNANDEZ  Mobile Phone: [phone]  Relation: Sister  Preferred language: English   needed? No  Secondary Emergency Contact: Yesenia Floyd  Mobile Phone: [phone]  Relation: Spouse   needed? No    Discharge Plan A: Home with family  Discharge Plan B: Home with family      [facility] [address] [facility] [address]  [address] [facility] [address]  [place] [address]  Phone: [phone] Fax: [phone]      Initial Assessment (most recent)       Adult Discharge Assessment - 10/24/23 0845          Discharge Assessment    Assessment Type Discharge Planning Assessment     Confirmed/corrected address, phone number and insurance Yes     Confirmed Demographics Correct on Facesheet     Source of Information patient     Communicated CELESTINA with patient/caregiver No     People in Home child(anika), dependent;spouse     Do you expect to return to your current living situation? Yes     Prior to hospitilization cognitive status: Alert/Oriented     Current cognitive status: Alert/Oriented     Home Accessibility wheelchair accessible     Home Layout Able to live on 1st floor     Equipment Currently Used at Home none     Readmission within 30 days? No     Patient currently being followed by outpatient case management? No     Do you currently have service(s) that help you manage your care at home? No     Do you  take prescription medications? Yes     Do you have prescription coverage? Yes     Coverage [employer]     Do you have any problems affording any of your prescribed medications? No     Is the patient taking medications as prescribed? yes     Who is going to help you get home at discharge? spouse     How do you get to doctors appointments? car, drives self     Are you on dialysis? No     Do you take coumadin? No     DME Needed Upon Discharge  none     Transition of Care Barriers None     Discharge Plan A Home with family     Discharge Plan B Home with family        Physical Activity    On average, how many days per week do you engage in moderate to strenuous exercise (like a brisk walk)? 2 days     On average, how many minutes do you engage in exercise at this level? 30 min        Financial Resource Strain    How hard is it for you to pay for the very basics like food, housing, medical care, and heating? Not hard at all        Housing Stability    In the last 12 months, was there a time when you were not able to pay the mortgage or rent on time? No     In the last 12 months, was there a time when you did not have a steady place to sleep or slept in a shelter (including now)? No        Transportation Needs    In the past 12 months, has lack of transportation kept you from medical appointments or from getting medications? No     In the past 12 months, has lack of transportation kept you from meetings, work, or from getting things needed for daily living? No        Food Insecurity    Within the past 12 months, you worried that your food would run out before you got the money to buy more. Never true     Within the past 12 months, the food you bought just didn't last and you didn't have money to get more. Never true        Social Connections    In a typical week, how many times do you talk on the phone with family, friends, or neighbors? More than three times a week     How often do you get together with friends or relatives?  More than three times a week     How often do you attend Rastafarian or Lutheran services? Never     Do you belong to any clubs or organizations such as Rastafarian groups, unions, fraternal or athletic groups, or school groups? No     How often do you attend meetings of the clubs or organizations you belong to? Never     Are you , , , , never , or living with a partner?         Alcohol Use    Q1: How often do you have a drink containing alcohol? 2-4 times a month     Q2: How many drinks containing alcohol do you have on a typical day when you are drinking? 1 or 2     Q3: How often do you have six or more drinks on one occasion? Never

## 2023-10-24 NOTE — ED PROVIDER NOTES
Chief complaint:  Rectal Bleeding (Bright red blood/clots during BMs.  Approximately 3 times tonight.  Happened last year as well.  Pt has photos.)      HPI:  Jules Floyd Jr. is a 53 y.o. male with hx htn, NIDDM, prior gastric sleeve presenting with acute onset of less than 1 day of bright red blood per rectum for several episodes starting today.. GIB 1 year ago with no definitive source on EGD and colonoscopy, but some suspicion for diverticular source at that time per review of EMR.  Repeat colonscopy around that time with erythematous region in rectosigmoid area noted.  He is had positional lightheadedness without syncope.  He denies chest pain or dyspnea.  Occasional crampy abdominal pain not currently present in the upper abdomen.  No radiation or migration of pain when present.  No history of antiplatelet agent or anticoagulation.  No emesis.  Decreased p.o. intake today since onset of rectal bleeding in the setting of chronically decreased amounts with prior gastric sleeve.    ROS: As per HPI and below:  No headache, confusion, swelling, rash, oliguria, dysuria, fever.    Review of patient's allergies indicates:   Allergen Reactions    Humalog audie kwikpen u-100  [insulin lispro] Hives, Itching, Rash and Swelling       Current Discharge Medication List        CONTINUE these medications which have NOT CHANGED    Details   buPROPion (WELLBUTRIN XL) 150 MG TB24 tablet Take 150 mg by mouth once daily.      calcium citrate (CALCITRATE) 200 mg (950 mg) tablet Take 1 tablet by mouth every evening.      meloxicam (MOBIC) 15 MG tablet Take 15 mg by mouth daily as needed for Pain.      metFORMIN (GLUCOPHAGE) 1000 MG tablet Take 1 tablet (1,000 mg total) by mouth 2 (two) times daily with meals.  Qty: 180 tablet, Refills: 3    Associated Diagnoses: Type 2 diabetes mellitus without complication, with long-term current use of insulin      metoprolol succinate (TOPROL-XL) 100 MG 24 hr tablet Take 50 mg by mouth 2 (two)  "times daily.      multivitamin capsule Take 1 capsule by mouth every evening. bariatric      omeprazole (PRILOSEC) 20 MG capsule Take 20 mg by mouth once daily.      oxyCODONE-acetaminophen (PERCOCET)  mg per tablet Take 1 tablet by mouth 2 (two) times daily as needed for Pain.      vitamin D (VITAMIN D3) 1000 units Tab Take 1,000 Units by mouth 2 (two) times a day.      BD ULTRA-FINE SHELDON PEN NEEDLE 32 gauge x 5/32" Ndle 1 pen by Misc.(Non-Drug; Combo Route) route 3 (three) times daily.       fosinopriL-hydrochlorothiazide (MONOPRIL-HCT) 20-12.5 mg per tablet Take 1 tablet by mouth.      FREESTYLE LOREN 14 DAY SENSOR Kit USE AS DIRECTED  Qty: 6 kit, Refills: 1      PRODIGY NO CODING Strp 1 strip by Misc.(Non-Drug; Combo Route) route as needed.       PRODIGY TWIST TOP LANCET 28 gauge Misc 1 lancet by Misc.(Non-Drug; Combo Route) route 3 (three) times daily as needed.              PMH:  As per HPI and below:  Past Medical History:   Diagnosis Date    Anxiety     COVID-19     Diabetes mellitus     GERD (gastroesophageal reflux disease)     Hypertension      Past Surgical History:   Procedure Laterality Date    BACK SURGERY      COLONOSCOPY N/A 8/25/2022    Procedure: COLONOSCOPY;  Surgeon: Bin Wallace MD;  Location: [facility];  Service: Endoscopy;  Laterality: N/A;    COLONOSCOPY N/A 8/30/2022    Procedure: COLONOSCOPY;  Surgeon: Carie Cabrera MD;  Location: [facility];  Service: Endoscopy;  Laterality: N/A;    ESOPHAGOGASTRODUODENOSCOPY N/A 8/29/2022    Procedure: EGD (ESOPHAGOGASTRODUODENOSCOPY)(with push);  Surgeon: Hayder Macias MD;  Location: [facility] ENDO;  Service: Endoscopy;  Laterality: N/A;    ESOPHAGOGASTRODUODENOSCOPY N/A 8/25/2022    Procedure: EGD (ESOPHAGOGASTRODUODENOSCOPY);  Surgeon: Bin Wallace MD;  Location: [facility] ENDO;  Service: Endoscopy;  Laterality: N/A;    gastric sleeve  04/2022    right knee arthroscope  1986       Social History     Socioeconomic History    Marital " status:    Tobacco Use    Smoking status: Never    Smokeless tobacco: Never   Substance and Sexual Activity    Alcohol use: Yes     Alcohol/week: 2.0 standard drinks of alcohol     Types: 2 Cans of beer per week     Comment: occassional    Drug use: No    Sexual activity: Yes     Partners: Female       Family History   Problem Relation Age of Onset    Arthritis Mother     Diabetes Mother     Heart disease Mother     Hyperlipidemia Father     Hypertension Father     Cancer Maternal Aunt        Physical Exam:    Vitals:    10/23/23 2339   BP: 94/61   Pulse: 79   Resp:    Temp: 97.3 °F (36.3 °C)     GENERAL:  No apparent distress.  Alert.    HEENT:  Dry mucous membranes.  Normocephalic and atraumatic.    NECK:  No swelling.  Midline trachea.   CARDIOVASCULAR:  Regular rate and rhythm.  2+ radial pulses.  No murmur.  PULMONARY:  Lungs clear to auscultation bilaterally.  No wheezes, rales, or rhonci.    ABDOMEN:  Non-tender and non-distended.    EXTREMITIES:  Warm and well perfused.  Brisk capillary refill.    NEUROLOGICAL:  Normal mental status.  Appropriate and conversant.    SKIN:  No rashes or ecchymoses.    RECTAL:  Bright red blood on the glove, guaiac positive.  No external lesions noted.    Labs Reviewed   CBC W/ AUTO DIFFERENTIAL - Abnormal; Notable for the following components:       Result Value    RBC 4.37 (*)     Hemoglobin 12.3 (*)     Hematocrit 38.0 (*)     All other components within normal limits   COMPREHENSIVE METABOLIC PANEL - Abnormal; Notable for the following components:    Glucose 136 (*)     BUN 26 (*)     Alkaline Phosphatase 54 (*)     All other components within normal limits   TYPE & SCREEN   GROUP & RH       Current Discharge Medication List        CONTINUE these medications which have NOT CHANGED    Details   buPROPion (WELLBUTRIN XL) 150 MG TB24 tablet Take 150 mg by mouth once daily.      calcium citrate (CALCITRATE) 200 mg (950 mg) tablet Take 1 tablet by mouth every evening.     "  meloxicam (MOBIC) 15 MG tablet Take 15 mg by mouth daily as needed for Pain.      metFORMIN (GLUCOPHAGE) 1000 MG tablet Take 1 tablet (1,000 mg total) by mouth 2 (two) times daily with meals.  Qty: 180 tablet, Refills: 3    Associated Diagnoses: Type 2 diabetes mellitus without complication, with long-term current use of insulin      metoprolol succinate (TOPROL-XL) 100 MG 24 hr tablet Take 50 mg by mouth 2 (two) times daily.      multivitamin capsule Take 1 capsule by mouth every evening. bariatric      omeprazole (PRILOSEC) 20 MG capsule Take 20 mg by mouth once daily.      oxyCODONE-acetaminophen (PERCOCET)  mg per tablet Take 1 tablet by mouth 2 (two) times daily as needed for Pain.      vitamin D (VITAMIN D3) 1000 units Tab Take 1,000 Units by mouth 2 (two) times a day.      BD ULTRA-FINE SHELDON PEN NEEDLE 32 gauge x 5/32" Ndle 1 pen by Misc.(Non-Drug; Combo Route) route 3 (three) times daily.       fosinopriL-hydrochlorothiazide (MONOPRIL-HCT) 20-12.5 mg per tablet Take 1 tablet by mouth.      FREESTYLE LOREN 14 DAY SENSOR Kit USE AS DIRECTED  Qty: 6 kit, Refills: 1      PRODIGY NO CODING Strp 1 strip by Misc.(Non-Drug; Combo Route) route as needed.       PRODIGY TWIST TOP LANCET 28 gauge Misc 1 lancet by Misc.(Non-Drug; Combo Route) route 3 (three) times daily as needed.              Orders Placed This Encounter   Procedures    CBC auto differential    Comprehensive metabolic panel    CBC auto differential    CBC auto differential    Basic Metabolic Panel    Magnesium    Iron and TIBC    Ferritin    Vitamin B12    Diet NPO Except for: Sips with Medication    NPO (Ice Chips)    Vital signs    Cardiac Monitoring - Adult    Vital signs    Orthostatic blood pressure With vital signs    Check Type and Screen complete    Check consent to blood transfusion    Place sequential compression device    Full code    Inpatient consult to Gastroenterology    Pulse Oximetry Continuous    EKG 12-lead    Type & Screen    " Group & Rh    Saline lock IV (18 ga. or larger)    2nd Saline lock IV (18 ga. or larger)    Large Bore IV Access    Possible Hospitalization    Place in Observation    Fall precautions       Imaging Results    None         ED Course as of 10/24/23 0305   Mon Oct 23, 2023   2047 EKG:  Normal sinus rhythm at a rate of 84.  Normal intervals.  Normal axis.  No significant ST or T wave changes suggesting acute ischemia or infarction.  Motion artifact noted in V5.  (Independently interpreted by me)   [MR]      ED Course User Index  [MR] Cali Rudolph MD       MDM:    53 y.o. male with acute onset of rectal bleeding with history of similar bleeding without identified source 1 year prior.  He does have positional lightheadedness.  Based on pattern of presentation I do think he would benefit from observation to ensure no worsening bleeding or need for transfusion.  I do not think requires transfusion at this point.  He does appear mildly dehydrated with IV fluid bolus given here and plan to reassess.  He is notably not tachycardic.  Hemoglobin here higher than historical baseline and may be trended.  I do not think he requires emergent GI or surgical evaluation this evening.  I will admit to Hospital Medicine.  Patient appears dehydrated with IV fluids given initially.  I do not think blood transfusion is indicated at this point with hemoglobin to be serially reassessed.  I have discussed with hospital medicine who will assume care.    Diagnoses:    1. Rectal bleeding       Cali Rudolph MD  10/24/23 0305

## 2023-10-24 NOTE — DISCHARGE INSTRUCTIONS
If any bleeding recurs, please return to the Emergency Department as GI recommends STAT CTA with GI bleeding protocol.

## 2023-10-24 NOTE — PLAN OF CARE
Pt. Transported back to room 218 via w/c s/p colonoscopy.  Recovered to baseline post anesthesia, VSS. Wife with pt.  Report given to nurse Ghosh

## 2023-10-24 NOTE — NURSING
Notified Eliza Haley NP that patient's morning hemoglobin and hematocrit is 9.9+30.1. Provider voiced understanding.

## 2023-10-24 NOTE — CONSULTS
[facility]     CC: Hematochezia    HPI 53 y.o. male with a PMHx of GI bleed, anxiety, gastric sleeve, and diabetes who presented to the ED with blood in stools onset time 1300.  Patient reports multiple episodes of bright red blood in stools and passing large clots.  He also reports lower abdominal cramping and lightheadedness.  No syncope.  He denies chest pain, palpitations, shortness a breath, cough or headache.  Patient does have history of hypertension and hyperlipidemia but is not on any medications currently.  Reports history of a GI bleed approximately 1 year ago were colonoscopy and EGD were obtained but did not show definitive source of bleeding.  He denies NSAIDs or EtOH use.  ED workup was significant for anemia and blood in stools. Hospital Medicine was consulted for further workup and management of the patient.     FURTHER HISTORY:  Above obtained from independent review of records from admitting provider as well as from direct discussion with patient's wife.  In addition, on my interview, I note the following:  Patient complains of hematochezia, abrupt onset, ongoing, bright red and with clots, with no alleviating/exacerbating factors.  Denies any other complaints.  Had similar complaint last year and had diverticulosis noted on colonoscopy.        Past Medical History:   Diagnosis Date    Anxiety     COVID-19     Diabetes mellitus     GERD (gastroesophageal reflux disease)     Hypertension        Past Surgical History:   Procedure Laterality Date    BACK SURGERY      COLONOSCOPY N/A 8/25/2022    Procedure: COLONOSCOPY;  Surgeon: Bin Wallace MD;  Location: [facility];  Service: Endoscopy;  Laterality: N/A;    COLONOSCOPY N/A 8/30/2022    Procedure: COLONOSCOPY;  Surgeon: Carie Cabrera MD;  Location: [facility];  Service: Endoscopy;  Laterality: N/A;    ESOPHAGOGASTRODUODENOSCOPY N/A 8/29/2022    Procedure: EGD (ESOPHAGOGASTRODUODENOSCOPY)(with push);  Surgeon: Hayder Macias  "MD;  Location: [facility] ENDO;  Service: Endoscopy;  Laterality: N/A;    ESOPHAGOGASTRODUODENOSCOPY N/A 8/25/2022    Procedure: EGD (ESOPHAGOGASTRODUODENOSCOPY);  Surgeon: Bin Wallace MD;  Location: [facility];  Service: Endoscopy;  Laterality: N/A;    gastric sleeve  04/2022    right knee arthroscope  1986       Social History     Tobacco Use    Smoking status: Never    Smokeless tobacco: Never   Substance Use Topics    Alcohol use: Yes     Alcohol/week: 2.0 standard drinks of alcohol     Types: 2 Cans of beer per week     Comment: occassional    Drug use: No       Family History   Problem Relation Age of Onset    Arthritis Mother     Diabetes Mother     Heart disease Mother     Hyperlipidemia Father     Hypertension Father     Cancer Maternal Aunt        Review of Systems  General ROS: negative for - chills, fever or weight loss  Psychological ROS: negative for - hallucination, depression or suicidal ideation  Ophthalmic ROS: negative for - blurry vision, photophobia or eye pain  ENT ROS: negative for - epistaxis, sore throat or rhinorrhea  Respiratory ROS: no cough, shortness of breath, or wheezing  Cardiovascular ROS: no chest pain or dyspnea on exertion  Gastrointestinal ROS: as above  Genito-Urinary ROS: no dysuria, trouble voiding, or hematuria  Musculoskeletal ROS: negative for - arthralgia, myalgia, weakness  Neurological ROS: no syncope or seizures; no ataxia  Dermatological ROS: negative for pruritis, rash and jaundice    Physical Examination  /73 (BP Location: Left arm, Patient Position: Lying)   Pulse 81   Temp 98.1 °F (36.7 °C) (Skin)   Resp 15   Ht 5' 9" (1.753 m)   Wt 87.5 kg (193 lb)   SpO2 97%   BMI 28.50 kg/m²   General appearance: alert, cooperative, no distress  HENT: Normocephalic, atraumatic, neck symmetrical, no nasal discharge   Eyes: conjunctivae/corneas clear, PERRL, EOM's intact, sclera anicteric  Lungs: clear to auscultation bilaterally, no dullness to percussion " bilaterally, symmetric expansion, breathing unlabored  Heart: regular rate and rhythm without rub; no displacement of the PMI   Abdomen: soft, NT  Extremities: extremities symmetric; no clubbing, cyanosis, or edema  Integument: Skin color, texture, turgor normal; no rashes; hair distrubution normal, no jaundice  Neurologic: Alert and oriented X 3, no focal sensory or motor neurologic deficits  Psychiatric: no pressured speech; normal affect; no evidence of impaired cognition, no anxiety/depression     Labs:  Lab Results   Component Value Date    WBC 6.54 10/24/2023    HGB 10.6 (L) 10/24/2023    HCT 32.5 (L) 10/24/2023    MCV 87 10/24/2023     10/24/2023         CMP  Sodium   Date Value Ref Range Status   10/24/2023 139 136 - 145 mmol/L Final   06/18/2017 140 134 - 144 mmol/L      Potassium   Date Value Ref Range Status   10/24/2023 3.4 (L) 3.5 - 5.1 mmol/L Final     Chloride   Date Value Ref Range Status   10/24/2023 108 95 - 110 mmol/L Final   06/18/2017 107 98 - 110 mmol/L      CO2   Date Value Ref Range Status   10/24/2023 23 23 - 29 mmol/L Final     Glucose   Date Value Ref Range Status   10/24/2023 103 70 - 110 mg/dL Final   06/18/2017 161 (H) 70 - 99 mg/dL      BUN   Date Value Ref Range Status   10/24/2023 19 6 - 20 mg/dL Final     Creatinine   Date Value Ref Range Status   10/24/2023 0.8 0.5 - 1.4 mg/dL Final   06/18/2017 0.86 0.60 - 1.40 mg/dL    01/13/2013 1.5 (H) 0.5 - 1.4 mg/dL Final     Calcium   Date Value Ref Range Status   10/24/2023 8.1 (L) 8.7 - 10.5 mg/dL Final   01/13/2013 9.4 8.7 - 10.5 mg/dL Final     Total Protein   Date Value Ref Range Status   10/23/2023 6.1 6.0 - 8.4 g/dL Final     Albumin   Date Value Ref Range Status   10/23/2023 3.5 3.5 - 5.2 g/dL Final     Total Bilirubin   Date Value Ref Range Status   10/23/2023 0.4 0.1 - 1.0 mg/dL Final     Comment:     For infants and newborns, interpretation of results should be based  on gestational age, weight and in agreement with  clinical  observations.    Premature Infant recommended reference ranges:  Up to 24 hours.............<8.0 mg/dL  Up to 48 hours............<12.0 mg/dL  3-5 days..................<15.0 mg/dL  6-29 days.................<15.0 mg/dL       Alkaline Phosphatase   Date Value Ref Range Status   10/23/2023 54 (L) 55 - 135 U/L Final   01/13/2013 63 55 - 135 U/L Final     AST   Date Value Ref Range Status   10/23/2023 11 10 - 40 U/L Final   01/13/2013 11 10 - 40 U/L Final     ALT   Date Value Ref Range Status   10/23/2023 10 10 - 44 U/L Final     Anion Gap   Date Value Ref Range Status   10/24/2023 8 8 - 16 mmol/L Final   01/13/2013 14 5 - 15 meq/L Final     eGFR   Date Value Ref Range Status   10/24/2023 >60 >60 mL/min/1.73 m^2 Final           Imaging:  Past CTA was independently visualized and reviewed by me and showed no evidence of bleeding.      I have personally reviewed these images      Further history was obtained from the patient's wife who was present throughout the interview and states that he had an episode like this in the recent past.  History is otherwise as above in the HPI.     Assessment:   Hematochezia  Anemia  Diverticulosis    Plan:  Transfuse PRN  Colonoscopy today  Further recommendations to follow after above.  Communication will be sent to the referring provider regarding my assessment and plan on this patient via EPIC.      Bin Shah MD  [facility]  [address], [address]  [place] [address]  Office: [phone]  Fax: [phone]

## 2023-10-24 NOTE — ANESTHESIA POSTPROCEDURE EVALUATION
Anesthesia Post Evaluation    Patient: Jules Floyd Jr.    Procedure(s) Performed: Procedure(s) (LRB):  COLONOSCOPY (N/A)    Final Anesthesia Type: general      Patient location during evaluation: PACU  Patient participation: Yes- Able to Participate  Level of consciousness: awake and alert and oriented  Post-procedure vital signs: reviewed and stable  Pain management: adequate  Airway patency: patent    PONV status at discharge: No PONV  Anesthetic complications: no      Cardiovascular status: blood pressure returned to baseline and stable  Respiratory status: unassisted and spontaneous ventilation  Hydration status: euvolemic  Follow-up not needed.          Vitals Value Taken Time   BP 97/63 10/24/23 1352   Temp   10/24/23 1354   Pulse 89 10/24/23 1354   Resp 16 10/24/23 1353   SpO2 100 % 10/24/23 1354   Vitals shown include unvalidated device data.      No case tracking events are documented in the log.      Pain/Kayla Score: Kayla Score: 7 (10/24/2023  1:51 PM)

## 2023-10-24 NOTE — ASSESSMENT & PLAN NOTE
Acute:  - GI consulted: appreciate recommendations  - initial H&H:  12.3 &38.0  - trend CBC  - fall precautions   -on tele  - PPI b.i.d. ordered  - NPO  - fall precautions

## 2023-10-24 NOTE — H&P
[facility]  History & Physical    Patient Name: Jules Floyd Jr.  MRN: [id number]  Patient Class: OP- Observation  Admission Date: 10/23/2023  Attending Physician: Melinda Pierre MD   Primary Care Provider: Ina Ellsworth NP         Patient information was obtained from patient, spouse/SO, past medical records and ER records.     Subjective:     Principal Problem:GIB (gastrointestinal bleeding)    Chief Complaint:   Chief Complaint   Patient presents with    Rectal Bleeding     Bright red blood/clots during BMs.  Approximately 3 times tonight.  Happened last year as well.  Pt has photos.        HPI: Jules Floyd Jr. is a 53 y.o. y.o. male with a PMHx of GI bleed, anxiety, gastric sleeve, and diabetes who presented to the ED with blood in stools onset time 1300.  Patient reports multiple episodes of bright red blood in stools and passing large clots.  He also reports lower abdominal cramping and lightheadedness.  No syncope.  He denies chest pain, palpitations, shortness a breath, cough or headache.  Patient does have history of hypertension and hyperlipidemia but is not on any medications currently.  Reports history of a GI bleed approximately 1 year ago were colonoscopy and EGD were obtained but did not show definitive source of bleeding.  He denies NSAIDs or EtOH use.  ED workup was significant for anemia and blood in stools. Hospital Medicine was consulted for further workup and management of the patient.         Past Medical History:   Diagnosis Date    Anxiety     COVID-19     Diabetes mellitus     GERD (gastroesophageal reflux disease)     Hypertension        Past Surgical History:   Procedure Laterality Date    BACK SURGERY      COLONOSCOPY N/A 8/25/2022    Procedure: COLONOSCOPY;  Surgeon: Bin Wallace MD;  Location: [facility];  Service: Endoscopy;  Laterality: N/A;    COLONOSCOPY N/A 8/30/2022    Procedure: COLONOSCOPY;  Surgeon: Carie Cabrera MD;   "Location: [facility] ENDO;  Service: Endoscopy;  Laterality: N/A;    ESOPHAGOGASTRODUODENOSCOPY N/A 8/29/2022    Procedure: EGD (ESOPHAGOGASTRODUODENOSCOPY)(with push);  Surgeon: Hayder Macias MD;  Location: [facility] ENDO;  Service: Endoscopy;  Laterality: N/A;    ESOPHAGOGASTRODUODENOSCOPY N/A 8/25/2022    Procedure: EGD (ESOPHAGOGASTRODUODENOSCOPY);  Surgeon: Bin Wallace MD;  Location: [facility];  Service: Endoscopy;  Laterality: N/A;    gastric sleeve  04/2022    right knee arthroscope  1986       Review of patient's allergies indicates:   Allergen Reactions    Humalog audie kwikpen u-100  [insulin lispro] Hives, Itching, Rash and Swelling       No current facility-administered medications on file prior to encounter.     Current Outpatient Medications on File Prior to Encounter   Medication Sig    buPROPion (WELLBUTRIN XL) 150 MG TB24 tablet Take 150 mg by mouth once daily.    calcium citrate (CALCITRATE) 200 mg (950 mg) tablet Take 1 tablet by mouth every evening.    meloxicam (MOBIC) 15 MG tablet Take 15 mg by mouth daily as needed for Pain.    metFORMIN (GLUCOPHAGE) 1000 MG tablet Take 1 tablet (1,000 mg total) by mouth 2 (two) times daily with meals.    metoprolol succinate (TOPROL-XL) 100 MG 24 hr tablet Take 50 mg by mouth 2 (two) times daily.    multivitamin capsule Take 1 capsule by mouth every evening. bariatric    omeprazole (PRILOSEC) 20 MG capsule Take 20 mg by mouth once daily.    oxyCODONE-acetaminophen (PERCOCET)  mg per tablet Take 1 tablet by mouth 2 (two) times daily as needed for Pain.    vitamin D (VITAMIN D3) 1000 units Tab Take 1,000 Units by mouth 2 (two) times a day.    BD ULTRA-FINE SHELDON PEN NEEDLE 32 gauge x 5/32" Ndle 1 pen by Misc.(Non-Drug; Combo Route) route 3 (three) times daily.     fosinopriL-hydrochlorothiazide (MONOPRIL-HCT) 20-12.5 mg per tablet Take 1 tablet by mouth.    FREEST360imaging LOREN 14 DAY SENSOR Kit USE AS DIRECTED    PRODIGY NO CODING Strp 1 " strip by Misc.(Non-Drug; Combo Route) route as needed.     PRODIGY TWIST TOP LANCET 28 gauge Misc 1 lancet by Misc.(Non-Drug; Combo Route) route 3 (three) times daily as needed.     [DISCONTINUED] atorvastatin (LIPITOR) 80 MG tablet Take 80 mg by mouth.    [DISCONTINUED] meclizine (ANTIVERT) 50 MG tablet Take 25 mg by mouth as needed.     Family History       Problem Relation (Age of Onset)    Arthritis Mother    Cancer Maternal Aunt    Diabetes Mother    Heart disease Mother    Hyperlipidemia Father    Hypertension Father          Tobacco Use    Smoking status: Never    Smokeless tobacco: Never   Substance and Sexual Activity    Alcohol use: Yes     Alcohol/week: 2.0 standard drinks of alcohol     Types: 2 Cans of beer per week     Comment: occassional    Drug use: No    Sexual activity: Yes     Partners: Female     Review of Systems   Constitutional:  Negative for fever.   Respiratory:  Negative for cough and shortness of breath.    Cardiovascular:  Negative for chest pain and palpitations.   Gastrointestinal:  Positive for abdominal pain (lower) and blood in stool. Negative for nausea.   Genitourinary:  Negative for difficulty urinating, dysuria and hematuria.   Neurological:  Positive for light-headedness.     Objective:     Vital Signs (Most Recent):  Temp: 98.5 °F (36.9 °C) (10/23/23 1859)  Pulse: 78 (10/23/23 2232)  Resp: 18 (10/23/23 2218)  BP: 107/66 (10/23/23 2232)  SpO2: 96 % (10/23/23 2232) Vital Signs (24h Range):  Temp:  [98.5 °F (36.9 °C)] 98.5 °F (36.9 °C)  Pulse:  [78-84] 78  Resp:  [18-19] 18  SpO2:  [94 %-98 %] 96 %  BP: ()/(63-72) 107/66     Weight: 88.5 kg (195 lb)  Body mass index is 28.8 kg/m².     Physical Exam  Vitals and nursing note reviewed.   Constitutional:       General: He is not in acute distress.     Appearance: Normal appearance. He is obese.   HENT:      Head: Normocephalic and atraumatic.      Mouth/Throat:      Mouth: Mucous membranes are moist.      Pharynx:  Oropharynx is clear.   Eyes:      Extraocular Movements: Extraocular movements intact.      Pupils: Pupils are equal, round, and reactive to light.   Cardiovascular:      Rate and Rhythm: Normal rate and regular rhythm.      Pulses: Normal pulses.      Heart sounds: Normal heart sounds.   Pulmonary:      Effort: Pulmonary effort is normal.      Breath sounds: Normal breath sounds.   Abdominal:      General: Abdomen is flat. Bowel sounds are normal.      Palpations: Abdomen is soft.      Tenderness: There is no abdominal tenderness. There is no guarding or rebound.   Musculoskeletal:      Cervical back: Normal range of motion and neck supple.   Skin:     General: Skin is warm.      Capillary Refill: Capillary refill takes less than 2 seconds.   Neurological:      General: No focal deficit present.      Mental Status: He is alert and oriented to person, place, and time. Mental status is at baseline.   Psychiatric:         Mood and Affect: Mood normal.         Behavior: Behavior normal.              CRANIAL NERVES     CN III, IV, VI   Pupils are equal, round, and reactive to light.       Significant Labs: All pertinent labs within the past 24 hours have been reviewed.  CBC:   Recent Labs   Lab 10/23/23  1934   WBC 6.76   HGB 12.3*   HCT 38.0*        CMP:   Recent Labs   Lab 10/23/23  1934      K 3.8      CO2 24   *   BUN 26*   CREATININE 1.0   CALCIUM 8.9   PROT 6.1   ALBUMIN 3.5   BILITOT 0.4   ALKPHOS 54*   AST 11   ALT 10   ANIONGAP 9       Significant Imaging: I have reviewed all pertinent imaging results/findings within the past 24 hours.    Assessment/Plan:     * GIB (gastrointestinal bleeding)  Acute:  - GI consulted: appreciate recommendations  - initial H&H:  12.3 &38.0  - trend CBC  - fall precautions   -on tele  - PPI b.i.d. ordered  - NPO  - fall precautions      Anxiety  Chronic:  - continue home med      Acute blood loss anemia  Patient's anemia is currently controlled. Has not  received any PRBCs to date.. Etiology likely d/t GIB; anemia labs ordered  Current CBC reviewed-   Lab Results   Component Value Date    HGB 12.3 (L) 10/23/2023    HCT 38.0 (L) 10/23/2023     Monitor serial CBC and transfuse if patient becomes hemodynamically unstable, symptomatic or H/H drops below 7/21.         GERD (gastroesophageal reflux disease)  Chronic:  - PPI BID      Hx of gastric bypass  Noted.       Hypertension  Chronic:  - labile BP  - BP was taken off of BP meds s/p gastric sleeve       VTE Risk Mitigation (From admission, onward)    None               On 10/23/2023, patient should be placed in hospital observation services under my care in collaboration with Dr. Pierre.          STEPHEN BluntC  Department of Hospital Medicine  [facility]

## 2023-10-24 NOTE — NURSING
Notified Eliza Haley NP of patient and situation, ISBAR provided, explained to provider that patient's repeat HNH is now 10.8+32.8, previously HNH was 12.3+38.0 at 1934. Provider voiced understanding and stated to continue to trend CBC. No new orders obtained.

## 2023-10-24 NOTE — DISCHARGE SUMMARY
Critical access hospital Medicine  Discharge Summary      Patient Name: Jules Floyd Jr.  MRN: [id number]  [facility]: [id number]  Patient Class: OP- Observation  Admission Date: 10/23/2023  Hospital Length of Stay: 0 days  Discharge Date and Time: 10/24/2023  3:00 PM  Attending Physician: Melinda Pierre MD   Discharging Provider: Debbie Gilbert NP  Primary Care Provider: Ina Ellsworth NP    Primary Care Team: Networked reference to record PCT     HPI:   Jules Floyd Jr. is a 53 y.o. y.o. male with a PMHx of GI bleed, anxiety, gastric sleeve, and diabetes who presented to the ED with blood in stools onset time 1300.  Patient reports multiple episodes of bright red blood in stools and passing large clots.  He also reports lower abdominal cramping and lightheadedness.  No syncope.  He denies chest pain, palpitations, shortness a breath, cough or headache.  Patient does have history of hypertension and hyperlipidemia but is not on any medications currently.  Reports history of a GI bleed approximately 1 year ago were colonoscopy and EGD were obtained but did not show definitive source of bleeding.  He denies NSAIDs or EtOH use.  ED workup was significant for anemia and blood in stools. Hospital Medicine was consulted for further workup and management of the patient.         Procedure(s) (LRB):  COLONOSCOPY (N/A)      Hospital Course:   Patient was admitted with GI bleeding.  He was placed on GI bleeding pathway and GI was consulted.  Patient was monitored closely during his stay.  His labs and vital signs were trended.  He underwent colonoscopy revealing diverticula and nonbleeding internal hemorrhoids.  He was cleared from GI perspective for discharge.  Discharge instructions as well as return precautions were discussed with patient with good understanding.  Patient was seen and evaluated on day of discharge and deemed appropriate.       Goals of Care Treatment Preferences:  Code Status: Full  Code      Consults:   Consults (From admission, onward)          Status Ordering Provider     Inpatient consult to Gastroenterology  Once        Provider:  Bin Wallace MD    Completed CAROLYN DORADO            No new Assessment & Plan notes have been filed under this hospital service since the last note was generated.  Service: Hospital Medicine    Final Active Diagnoses:    Diagnosis Date Noted POA    PRINCIPAL PROBLEM:  GIB (gastrointestinal bleeding) [K92.2] 08/28/2022 Yes    Anxiety [F41.9] 10/23/2023 Yes    Acute blood loss anemia [D62] 08/28/2022 Yes    Hx of gastric bypass [Z98.84] 08/25/2022 Not Applicable    GERD (gastroesophageal reflux disease) [K21.9] 08/25/2022 Yes    Hypertension [I10] 09/10/2019 Yes      Problems Resolved During this Admission:       Discharged Condition: good    Disposition: Home or Self Care    Follow Up:   Follow-up Information       Ina Ellsworth NP Follow up in 1 week(s).    Specialty: Family Medicine  Contact information:  [address] [place] [address] [phone]                           Patient Instructions:      Diet diabetic     Activity as tolerated       Significant Diagnostic Studies: Labs:   CMP   Recent Labs   Lab 10/23/23  1934 10/24/23  0440    139   K 3.8 3.4*    108   CO2 24 23   * 103   BUN 26* 19   CREATININE 1.0 0.8   CALCIUM 8.9 8.1*   PROT 6.1  --    ALBUMIN 3.5  --    BILITOT 0.4  --    ALKPHOS 54*  --    AST 11  --    ALT 10  --    ANIONGAP 9 8    and CBC   Recent Labs   Lab 10/23/23  2336 10/24/23  0440 10/24/23  1248   WBC 10.36 6.86 6.54   HGB 10.8* 9.9* 10.6*   HCT 32.8* 30.1* 32.5*    209 222     Endoscopy: Colonoscopy:     Impression:            - Non-bleeding internal hemorrhoids.                          - Diverticulosis in the sigmoid colon, in the                          descending colon, in the transverse colon and in                          the ascending colon.                           - The rectum, cecum, ileocecal valve and                          appendiceal orifice are normal.                          - The examined portion of the ileum was normal.                          - No specimens collected.   Recommendation:        - Return patient to hospital [facility] for ongoing care.                          - High fiber diet.                          - Continue present medications.                          - Repeat colonoscopy in 5 years for surveillance.       Pending Diagnostic Studies:       Procedure Component Value Units Date/Time    CBC auto differential [[id number]]     Order Status: Sent Lab Status: No result     Specimen: Blood            Medications:  Reconciled Home Medications:      Medication List        CONTINUE taking these medications      buPROPion 150 MG TB24 tablet  Commonly known as: WELLBUTRIN XL  Take 150 mg by mouth once daily.     calcium citrate 200 mg (950 mg) tablet  Commonly known as: CALCITRATE  Take 1 tablet by mouth every evening.     DigiSynd LOREN 14 DAY SENSOR Kit  Generic drug: flash glucose sensor  USE AS DIRECTED     meloxicam 15 MG tablet  Commonly known as: MOBIC  Take 15 mg by mouth daily as needed for Pain.     metFORMIN 1000 MG tablet  Commonly known as: GLUCOPHAGE  Take 1 tablet (1,000 mg total) by mouth 2 (two) times daily with meals.     metoprolol succinate 100 MG 24 hr tablet  Commonly known as: TOPROL-XL  Take 50 mg by mouth 2 (two) times daily.     multivitamin capsule  Take 1 capsule by mouth every evening. bariatric     omeprazole 20 MG capsule  Commonly known as: PRILOSEC  Take 20 mg by mouth once daily.     oxyCODONE-acetaminophen  mg per tablet  Commonly known as: PERCOCET  Take 1 tablet by mouth 2 (two) times daily as needed for Pain.     vitamin D 1000 units Tab  Commonly known as: VITAMIN D3  Take 1,000 Units by mouth 2 (two) times a day.            STOP taking these medications      BD ULTRA-FINE SHELDON PEN NEEDLE 32 gauge x  "5/32" Ndle  Generic drug: pen needle, diabetic     PRODIGY NO CODING Strp  Generic drug: blood sugar diagnostic     PRODIGY TWIST TOP LANCET 28 gauge Misc  Generic drug: lancets              Indwelling Lines/Drains at time of discharge:   Lines/Drains/Airways       None                   Time spent on the discharge of patient: 36 minutes         Debbie Gilbert NP  Department of Hospital Medicine  [facility]/Surg  "

## 2024-01-18 ENCOUNTER — OFFICE VISIT (OUTPATIENT)
Dept: URGENT CARE | Facility: CLINIC | Age: 54
End: 2024-01-18
Payer: COMMERCIAL

## 2024-01-18 VITALS
BODY MASS INDEX: 28.58 KG/M2 | HEART RATE: 75 BPM | WEIGHT: 193 LBS | RESPIRATION RATE: 16 BRPM | TEMPERATURE: 98 F | HEIGHT: 69 IN | SYSTOLIC BLOOD PRESSURE: 135 MMHG | DIASTOLIC BLOOD PRESSURE: 79 MMHG

## 2024-01-18 DIAGNOSIS — R05.9 COUGH, UNSPECIFIED TYPE: ICD-10-CM

## 2024-01-18 DIAGNOSIS — R50.9 FEVER, UNSPECIFIED FEVER CAUSE: ICD-10-CM

## 2024-01-18 DIAGNOSIS — J06.9 ACUTE URI: Primary | ICD-10-CM

## 2024-01-18 LAB
CTP QC/QA: YES
FLUAV AG NPH QL: NEGATIVE
FLUBV AG NPH QL: NEGATIVE
S PYO RRNA THROAT QL PROBE: NEGATIVE
SARS-COV-2 AG RESP QL IA.RAPID: NEGATIVE

## 2024-01-18 PROCEDURE — 99214 OFFICE O/P EST MOD 30 MIN: CPT | Mod: S$GLB,,, | Performed by: NURSE PRACTITIONER

## 2024-01-18 PROCEDURE — 87880 STREP A ASSAY W/OPTIC: CPT | Mod: QW,,, | Performed by: NURSE PRACTITIONER

## 2024-01-18 PROCEDURE — 87804 INFLUENZA ASSAY W/OPTIC: CPT | Mod: 59,QW,, | Performed by: NURSE PRACTITIONER

## 2024-01-18 PROCEDURE — 87811 SARS-COV-2 COVID19 W/OPTIC: CPT | Mod: QW,S$GLB,, | Performed by: NURSE PRACTITIONER

## 2024-01-18 RX ORDER — AZITHROMYCIN 250 MG/1
TABLET, FILM COATED ORAL
Qty: 6 TABLET | Refills: 0 | Status: SHIPPED | OUTPATIENT
Start: 2024-01-18 | End: 2024-01-23

## 2024-01-18 RX ORDER — BENZONATATE 100 MG/1
100 CAPSULE ORAL 3 TIMES DAILY PRN
Qty: 30 CAPSULE | Refills: 0 | Status: SHIPPED | OUTPATIENT
Start: 2024-01-18 | End: 2024-01-28

## 2024-01-18 RX ORDER — GABAPENTIN 100 MG/1
100 CAPSULE ORAL NIGHTLY
COMMUNITY
Start: 2024-01-16

## 2024-01-18 NOTE — LETTER
January 18, 2024      [facility] [place]  [address]  [address]  [place] [address]  Phone: [phone]  Fax: [phone]       Patient: Jules Floyd   YOB: 1970  Date of Visit: 01/18/2024    To Whom It May Concern:    Eladia Floyd  was at [facility] on 01/18/2024. The patient may return to work/school on 1/22 with no restrictions. If you have any questions or concerns, or if I can be of further assistance, please do not hesitate to contact me.    Sincerely,    Marco Antonio Richmond NP

## 2024-01-18 NOTE — PROGRESS NOTES
"Subjective:      Patient ID: Jules Floyd Jr. is a 53 y.o. male.    Vitals:  height is 5' 9" (1.753 m) and weight is 87.5 kg (193 lb). His oral temperature is 98.3 °F (36.8 °C). His blood pressure is 135/79 and his pulse is 75. His respiration is 16 and oxygen saturation is 96% (pended).     Chief Complaint: Fever    Fever   This is a new problem. The current episode started in the past 7 days (3 days). The problem occurs constantly. The maximum temperature noted was 103 to 103.9 F (103.1). The temperature was taken using an axillary reading. Associated symptoms include congestion, coughing, ear pain, muscle aches and a sore throat. Pertinent negatives include no abdominal pain, chest pain, diarrhea, headaches, nausea, rash, urinary pain, vomiting or wheezing.       Constitution: Positive for chills, fatigue and fever. Negative for activity change, appetite change, unexpected weight change and generalized weakness.   HENT:  Positive for ear pain, congestion and sore throat. Negative for ear discharge, foreign body in ear, tinnitus, hearing loss, dental problem, mouth sores, tongue pain, facial swelling, postnasal drip, sinus pain, sinus pressure, trouble swallowing and voice change.    Neck: Negative for neck pain, neck stiffness and painful lymph nodes.   Cardiovascular:  Negative for chest pain, leg swelling, palpitations and sob on exertion.   Eyes:  Negative for eye trauma, eye discharge, eye itching, eye pain, eye redness, vision loss and eyelid swelling.   Respiratory:  Positive for cough and sputum production. Negative for chest tightness, COPD, shortness of breath, wheezing and asthma.    Gastrointestinal:  Negative for abdominal pain, nausea, vomiting, constipation, diarrhea, bright red blood in stool and dark colored stools.   Endocrine: hair loss, cold intolerance and heat intolerance.   Genitourinary:  Negative for dysuria, frequency, urgency, flank pain and hematuria.   Musculoskeletal:  Negative for " pain, trauma, joint pain, joint swelling, abnormal ROM of joint, back pain and muscle cramps.   Skin:  Negative for color change, pale, rash, wound and hives.   Allergic/Immunologic: Negative for environmental allergies, seasonal allergies, food allergies, asthma, hives and itching.   Neurological:  Negative for dizziness, history of vertigo, light-headedness, facial drooping, speech difficulty, headaches, disorientation, altered mental status, loss of consciousness and numbness.   Hematologic/Lymphatic: Negative for swollen lymph nodes and easy bruising/bleeding. Does not bruise/bleed easily.   Psychiatric/Behavioral:  Negative for altered mental status, disorientation, confusion, agitation, sleep disturbance and hallucinations.       Objective:     Physical Exam   Constitutional: He is oriented to person, place, and time. He appears well-developed. He is cooperative.   HENT:   Head: Normocephalic and atraumatic.   Ears:   Right Ear: Hearing, external ear and ear canal normal. A middle ear effusion is present.   Left Ear: Hearing, external ear and ear canal normal. A middle ear effusion is present.   Nose: Rhinorrhea present. No mucosal edema or nasal deformity. No epistaxis. Right sinus exhibits no maxillary sinus tenderness and no frontal sinus tenderness. Left sinus exhibits no maxillary sinus tenderness and no frontal sinus tenderness.   Mouth/Throat: Uvula is midline and mucous membranes are normal. No trismus in the jaw. Normal dentition. No uvula swelling. Posterior oropharyngeal erythema present. No oropharyngeal exudate or posterior oropharyngeal edema. No tonsillar exudate.   Eyes: Conjunctivae and lids are normal.   Neck: Trachea normal and phonation normal. Neck supple.   Cardiovascular: Normal rate, regular rhythm, normal heart sounds and normal pulses.   Pulmonary/Chest: Effort normal and breath sounds normal.   Abdominal: Normal appearance and bowel sounds are normal. Soft.   Musculoskeletal: Normal  range of motion.         General: Normal range of motion.   Neurological: He is alert and oriented to person, place, and time. He exhibits normal muscle tone.   Skin: Skin is warm, dry and intact.   Psychiatric: His speech is normal and behavior is normal. Judgment and thought content normal.   Nursing note and vitals reviewed.      Assessment:     1. Acute URI    2. Fever, unspecified fever cause    3. Cough, unspecified type        Plan:       Acute URI  -     azithromycin (Z-RICK) 250 MG tablet; Take 2 tablets by mouth on day 1; Take 1 tablet by mouth on days 2-5  Dispense: 6 tablet; Refill: 0    Fever, unspecified fever cause  -     SARS Coronavirus 2 Antigen, POCT Manual Read  -     POCT rapid strep A  -     POCT Influenza A/B Rapid Antigen    Cough, unspecified type  -     benzonatate (TESSALON) 100 MG capsule; Take 1 capsule (100 mg total) by mouth 3 (three) times daily as needed for Cough.  Dispense: 30 capsule; Refill: 0      Utilize over-the-counter Tylenol or Motrin as directed for fever.    Ensure adequate fluid intake with electrolytes.    Return to clinic for new or worsening symptoms.  Patient is recommended to follow-up with their PCP post discharge.    Total time spent on med rec, H&P, with over half of the time in direct patient care: 37 minutes         Additional MDM:     Heart Failure Score:   COPD = No

## 2024-01-22 PROBLEM — K92.2 GIB (GASTROINTESTINAL BLEEDING): Status: RESOLVED | Noted: 2022-08-28 | Resolved: 2024-01-22

## 2024-09-08 ENCOUNTER — OFFICE VISIT (OUTPATIENT)
Dept: URGENT CARE | Facility: CLINIC | Age: 54
End: 2024-09-08
Payer: COMMERCIAL

## 2024-09-08 VITALS
RESPIRATION RATE: 16 BRPM | WEIGHT: 193 LBS | SYSTOLIC BLOOD PRESSURE: 127 MMHG | HEART RATE: 69 BPM | HEIGHT: 69 IN | OXYGEN SATURATION: 97 % | TEMPERATURE: 99 F | BODY MASS INDEX: 28.58 KG/M2 | DIASTOLIC BLOOD PRESSURE: 86 MMHG

## 2024-09-08 DIAGNOSIS — R52 BODY ACHES: ICD-10-CM

## 2024-09-08 DIAGNOSIS — R05.1 ACUTE COUGH: Primary | ICD-10-CM

## 2024-09-08 DIAGNOSIS — R09.82 POST-NASAL DRIP: ICD-10-CM

## 2024-09-08 DIAGNOSIS — R68.83 CHILLS: ICD-10-CM

## 2024-09-08 DIAGNOSIS — J06.9 VIRAL URI WITH COUGH: ICD-10-CM

## 2024-09-08 LAB
CTP QC/QA: YES
CTP QC/QA: YES
FLUAV AG NPH QL: NEGATIVE
FLUBV AG NPH QL: NEGATIVE
SARS-COV-2 AG RESP QL IA.RAPID: NEGATIVE

## 2024-09-08 PROCEDURE — 99214 OFFICE O/P EST MOD 30 MIN: CPT | Mod: S$GLB,,,

## 2024-09-08 PROCEDURE — 87804 INFLUENZA ASSAY W/OPTIC: CPT | Mod: QW,,,

## 2024-09-08 PROCEDURE — 87811 SARS-COV-2 COVID19 W/OPTIC: CPT | Mod: QW,S$GLB,,

## 2024-09-08 RX ORDER — LEVOCETIRIZINE DIHYDROCHLORIDE 5 MG/1
5 TABLET, FILM COATED ORAL NIGHTLY
Qty: 14 TABLET | Refills: 0 | Status: SHIPPED | OUTPATIENT
Start: 2024-09-08 | End: 2024-09-22

## 2024-09-08 RX ORDER — LEVOCETIRIZINE DIHYDROCHLORIDE 5 MG/1
5 TABLET, FILM COATED ORAL NIGHTLY
Qty: 14 TABLET | Refills: 0 | Status: SHIPPED | OUTPATIENT
Start: 2024-09-08 | End: 2024-09-08

## 2024-09-08 RX ORDER — PROMETHAZINE HYDROCHLORIDE AND DEXTROMETHORPHAN HYDROBROMIDE 6.25; 15 MG/5ML; MG/5ML
5 SYRUP ORAL EVERY 4 HOURS PRN
Qty: 118 ML | Refills: 0 | Status: SHIPPED | OUTPATIENT
Start: 2024-09-08 | End: 2024-09-08

## 2024-09-08 RX ORDER — PROMETHAZINE HYDROCHLORIDE AND DEXTROMETHORPHAN HYDROBROMIDE 6.25; 15 MG/5ML; MG/5ML
5 SYRUP ORAL EVERY 4 HOURS PRN
Qty: 118 ML | Refills: 0 | Status: SHIPPED | OUTPATIENT
Start: 2024-09-08 | End: 2024-09-18

## 2024-09-08 NOTE — LETTER
September 08/2024      [facility] [place]  [address]  [address]  [place] [address]  Phone: [phone]  Fax: [phone]       Patient: Jules Floyd   YOB: 1970  Date of Visit: 09/08/2024    To Whom It May Concern:    Eladia Floyd  was at [facility] on 09/08/2024. The patient may return to work/school on 09/13/2024 with no restrictions. If you have any questions or concerns, or if I can be of further assistance, please do not hesitate to contact me.    Sincerely,    Yessi Field MA

## 2024-09-08 NOTE — PATIENT INSTRUCTIONS
Thank you for allowing me to be part of your healthcare team at [facility]. It is a pleasure to care for you today.   Please take all of your medications as instructed and follow all new instructions from your visit today.  If you received labs or medical tests today you should hear information about results or scheduling either by phone or mychart within approximately a week.   If you have any questions or concerns please do not hesitate to call. Have a blessed day.   JAYDA Bentley

## 2024-09-08 NOTE — PROGRESS NOTES
"Subjective:      Patient ID: Jules Floyd Jr. is a 53 y.o. male.    Vitals:  height is 5' 9" (1.753 m) and weight is 87.5 kg (193 lb). His oral temperature is 98.7 °F (37.1 °C). His blood pressure is 127/86 and his pulse is 69. His respiration is 16 and oxygen saturation is 97%.     Chief Complaint: Generalized Body Aches    Patient presents to the clinic with complaint of body aches.     Symptoms started yesterday with chills, headache, cough, congestion, and body aches.   Has not taken anything for symptoms.     Cough  This is a new problem. The current episode started yesterday. The problem has been unchanged. The cough is Productive of sputum. Associated symptoms include chills, a fever, headaches, myalgias, nasal congestion and postnasal drip. Pertinent negatives include no chest pain, ear pain, sore throat, shortness of breath or wheezing. He has tried nothing for the symptoms. The treatment provided no relief.       Constitution: Positive for chills and fever. Negative for appetite change, sweating, fatigue and generalized weakness.   HENT:  Positive for postnasal drip. Negative for ear pain, congestion, sinus pain, sinus pressure, sore throat, trouble swallowing and voice change.    Neck: Negative for neck pain, neck stiffness, painful lymph nodes and neck swelling.   Cardiovascular:  Negative for chest pain, leg swelling and palpitations.   Respiratory:  Positive for cough. Negative for chest tightness, shortness of breath and wheezing.    Gastrointestinal:  Negative for abdominal pain, nausea, vomiting, constipation and diarrhea.   Genitourinary:  Negative for dysuria, frequency, urgency and urine decreased.   Musculoskeletal:  Positive for muscle ache.   Skin:  Negative for color change and pale.   Allergic/Immunologic: Negative for chronic cough.   Neurological:  Positive for headaches. Negative for dizziness, disorientation and altered mental status.   Hematologic/Lymphatic: Negative for swollen lymph " nodes.   Psychiatric/Behavioral:  Negative for altered mental status, disorientation and confusion.       Objective:     Physical Exam   Constitutional: He is oriented to person, place, and time. He appears well-developed. He is cooperative.  Non-toxic appearance. He does not appear ill. No distress.   HENT:   Head: Normocephalic and atraumatic.   Ears:   Right Ear: Hearing and external ear normal.   Left Ear: Hearing and external ear normal.   Nose: Nose normal. No mucosal edema, rhinorrhea or nasal deformity. No epistaxis. Right sinus exhibits no maxillary sinus tenderness and no frontal sinus tenderness. Left sinus exhibits no maxillary sinus tenderness and no frontal sinus tenderness.   Mouth/Throat: Uvula is midline, oropharynx is clear and moist and mucous membranes are normal. No trismus in the jaw. Normal dentition. No uvula swelling. No oropharyngeal exudate, posterior oropharyngeal edema or posterior oropharyngeal erythema.   Eyes: Conjunctivae and lids are normal. No scleral icterus.   Neck: Trachea normal and phonation normal. Neck supple. No edema present. No erythema present. No neck rigidity present.   Cardiovascular: Normal rate, regular rhythm, normal heart sounds and normal pulses.   Pulmonary/Chest: Effort normal and breath sounds normal. No respiratory distress. He has no decreased breath sounds. He has no rhonchi.   Abdominal: Normal appearance.   Musculoskeletal: Normal range of motion.         General: No deformity. Normal range of motion.   Neurological: He is alert and oriented to person, place, and time. He exhibits normal muscle tone. Coordination normal.   Skin: Skin is warm, dry, intact, not diaphoretic and not pale.   Psychiatric: His speech is normal and behavior is normal. Judgment and thought content normal.   Nursing note and vitals reviewed.      Assessment:     1. Acute cough    2. Chills    3. Post-nasal drip    4. Body aches    5. Viral URI with cough        Plan:       Acute  cough  -     SARS Coronavirus 2 Antigen, POCT Manual Read  -     POCT Influenza A/B Rapid Antigen  -     Discontinue: promethazine-dextromethorphan (PROMETHAZINE-DM) 6.25-15 mg/5 mL Syrp; Take 5 mLs by mouth every 4 (four) hours as needed (cough).  Dispense: 118 mL; Refill: 0  -     Discontinue: levocetirizine (XYZAL) 5 MG tablet; Take 1 tablet (5 mg total) by mouth every evening. for 14 days  Dispense: 14 tablet; Refill: 0  -     levocetirizine (XYZAL) 5 MG tablet; Take 1 tablet (5 mg total) by mouth every evening. for 14 days  Dispense: 14 tablet; Refill: 0  -     promethazine-dextromethorphan (PROMETHAZINE-DM) 6.25-15 mg/5 mL Syrp; Take 5 mLs by mouth every 4 (four) hours as needed (cough).  Dispense: 118 mL; Refill: 0    Chills    Post-nasal drip    Body aches    Viral URI with cough       - Negative Flu, Negative Covid  - Rotate Tylenol and Motrin as directed for pain and fever  - Take medications as prescribed.  - Assure adequate hydration.  - Follow-up with PCP in 1-2 days.  - Return to clinic as needed.  - To ED for any new or acutely worsening symptoms including but not limited to chest pain, palpitations, shortness of breath, or fever greater than 103° F.  Patient in agreement with plan of care.     - The diagnosis, treatment plan, instructions for follow-up and reevaluation as well as ED precautions were discussed and understanding was verbalized. All questions or concerns have been addressed.

## 2024-09-10 ENCOUNTER — OFFICE VISIT (OUTPATIENT)
Dept: URGENT CARE | Facility: CLINIC | Age: 54
End: 2024-09-10
Payer: COMMERCIAL

## 2024-09-10 VITALS
HEIGHT: 69 IN | BODY MASS INDEX: 28.58 KG/M2 | TEMPERATURE: 98 F | OXYGEN SATURATION: 98 % | WEIGHT: 193 LBS | DIASTOLIC BLOOD PRESSURE: 87 MMHG | SYSTOLIC BLOOD PRESSURE: 145 MMHG | RESPIRATION RATE: 16 BRPM | HEART RATE: 79 BPM

## 2024-09-10 DIAGNOSIS — R50.9 FEVER, UNSPECIFIED FEVER CAUSE: ICD-10-CM

## 2024-09-10 DIAGNOSIS — N39.0 ACUTE URINARY TRACT INFECTION: Primary | ICD-10-CM

## 2024-09-10 LAB
BILIRUB UR QL STRIP: POSITIVE
GLUCOSE UR QL STRIP: NEGATIVE
KETONES UR QL STRIP: POSITIVE
LEUKOCYTE ESTERASE UR QL STRIP: POSITIVE
PH, POC UA: 6.5
POC BLOOD, URINE: POSITIVE
POC NITRATES, URINE: POSITIVE
PROT UR QL STRIP: POSITIVE
SP GR UR STRIP: 1.02 (ref 1–1.03)
UROBILINOGEN UR STRIP-ACNC: 1 (ref 0.3–2.2)

## 2024-09-10 PROCEDURE — 81003 URINALYSIS AUTO W/O SCOPE: CPT | Mod: QW,S$GLB,, | Performed by: NURSE PRACTITIONER

## 2024-09-10 PROCEDURE — 99214 OFFICE O/P EST MOD 30 MIN: CPT | Mod: S$GLB,,, | Performed by: NURSE PRACTITIONER

## 2024-09-10 RX ORDER — NITROFURANTOIN 25; 75 MG/1; MG/1
100 CAPSULE ORAL 2 TIMES DAILY
Qty: 14 CAPSULE | Refills: 0 | Status: SHIPPED | OUTPATIENT
Start: 2024-09-10 | End: 2024-09-17

## 2024-09-10 NOTE — LETTER
September 10, 2024      [facility] [place]  [address]  [address]  [place] [address]  Phone: [phone]  Fax: [phone]       Patient: Jules Floyd   YOB: 1970  Date of Visit: 09/8/2024    To Whom It May Concern:    Eladia Floyd  was at [facility] on 09/8/2024. The patient may return to work/school on 9/13 with no restrictions.  Patient provided verbal consent to have treating diagnosis printed on work excuse.  Patient receiving treatment for UTI.  If you have any questions or concerns, or if I can be of further assistance, please do not hesitate to contact me.    Sincerely,    Marco Antonio Richmond NP

## 2024-09-13 LAB
BACTERIA UR CULT: ABNORMAL
BACTERIA UR CULT: ABNORMAL
OTHER ANTIBIOTIC SUSC ISLT: ABNORMAL

## 2024-10-27 ENCOUNTER — HOSPITAL ENCOUNTER (EMERGENCY)
Facility: HOSPITAL | Age: 54
Discharge: HOME | End: 2024-10-27
Payer: COMMERCIAL

## 2024-10-27 VITALS
RESPIRATION RATE: 16 BRPM | HEART RATE: 90 BPM | OXYGEN SATURATION: 97 % | HEIGHT: 69 IN | WEIGHT: 198 LBS | DIASTOLIC BLOOD PRESSURE: 77 MMHG | TEMPERATURE: 98 F | SYSTOLIC BLOOD PRESSURE: 109 MMHG | BODY MASS INDEX: 29.33 KG/M2

## 2024-10-27 DIAGNOSIS — S50.12XA CONTUSION OF LEFT FOREARM, INITIAL ENCOUNTER: Primary | ICD-10-CM

## 2024-10-27 DIAGNOSIS — S59.912A: ICD-10-CM

## 2024-10-27 PROCEDURE — 99282 EMERGENCY DEPT VISIT SF MDM: CPT | Mod: ,,, | Performed by: NURSE PRACTITIONER

## 2024-10-27 PROCEDURE — 99283 EMERGENCY DEPT VISIT LOW MDM: CPT | Mod: 25

## 2024-10-27 RX ORDER — SEMAGLUTIDE 1.34 MG/ML
INJECTION, SOLUTION SUBCUTANEOUS
COMMUNITY
Start: 2024-10-21

## 2025-01-16 ENCOUNTER — OFFICE VISIT (OUTPATIENT)
Dept: URGENT CARE | Facility: CLINIC | Age: 55
End: 2025-01-16
Payer: COMMERCIAL

## 2025-01-16 VITALS
HEIGHT: 69 IN | TEMPERATURE: 98 F | WEIGHT: 195 LBS | HEART RATE: 90 BPM | BODY MASS INDEX: 28.88 KG/M2 | DIASTOLIC BLOOD PRESSURE: 90 MMHG | RESPIRATION RATE: 19 BRPM | SYSTOLIC BLOOD PRESSURE: 147 MMHG | OXYGEN SATURATION: 99 %

## 2025-01-16 DIAGNOSIS — Z20.822 COVID-19 VIRUS NOT DETECTED: ICD-10-CM

## 2025-01-16 DIAGNOSIS — J06.9 VIRAL URI WITH COUGH: Primary | ICD-10-CM

## 2025-01-16 DIAGNOSIS — R05.9 COUGH, UNSPECIFIED TYPE: ICD-10-CM

## 2025-01-16 DIAGNOSIS — R59.0 CERVICAL LYMPHADENOPATHY: ICD-10-CM

## 2025-01-16 DIAGNOSIS — R89.4 INFLUENZA A VIRUS NOT DETECTED: ICD-10-CM

## 2025-01-16 DIAGNOSIS — R50.9 FEVER, UNSPECIFIED FEVER CAUSE: ICD-10-CM

## 2025-01-16 PROCEDURE — 87804 INFLUENZA ASSAY W/OPTIC: CPT | Mod: QW,,,

## 2025-01-16 PROCEDURE — 99214 OFFICE O/P EST MOD 30 MIN: CPT | Mod: S$GLB,,,

## 2025-01-16 PROCEDURE — 87811 SARS-COV-2 COVID19 W/OPTIC: CPT | Mod: QW,S$GLB,,

## 2025-01-16 PROCEDURE — 87880 STREP A ASSAY W/OPTIC: CPT | Mod: QW,,,

## 2025-01-16 RX ORDER — PROMETHAZINE HYDROCHLORIDE AND DEXTROMETHORPHAN HYDROBROMIDE 6.25; 15 MG/5ML; MG/5ML
5 SYRUP ORAL NIGHTLY PRN
Qty: 50 ML | Refills: 0 | Status: SHIPPED | OUTPATIENT
Start: 2025-01-16 | End: 2025-01-26

## 2025-01-16 NOTE — LETTER
January 16, 2025      [facility]  [address]  [facility] [address]  Phone: [phone]       Patient: Jules Floyd   YOB: 1970  Date of Visit: 01/16/2025    To Whom It May Concern:    Eladia Floyd  was at [facility] on 01/16/2025. The patient may return to work/school on 1/20/25 with no restrictions. If you have any questions or concerns, or if I can be of further assistance, please do not hesitate to contact me.    Sincerely,    JAYDA Melendez

## 2025-01-17 NOTE — PROGRESS NOTES
"Subjective:      Patient ID: Jules Floyd Jr. is a 54 y.o. male.    Vitals:  height is 5' 9" (1.753 m) and weight is 88.5 kg (195 lb). His oral temperature is 97.9 °F (36.6 °C). His blood pressure is 147/90 (abnormal) and his pulse is 90. His respiration is 19 and oxygen saturation is 99%.     Chief Complaint: Cough    Fever, headache, congestion, postnasal drip, sore throat, nonproductive cough, diarrhea, and body aches since yesterday.  Multiple ill family members COVID-19.  Patient is vaccinated for COVID-19, but not boosted.  Isn't vaccinated for influenza this year.  Denies history of smoking.  No shortness a breath or chest pain.  He is use Tylenol for symptoms.    Cough  This is a new problem. The current episode started yesterday. The problem has been gradually worsening. The cough is Non-productive. Associated symptoms include chills, a fever, headaches, myalgias, nasal congestion, postnasal drip, rhinorrhea, a sore throat and sweats. The symptoms are aggravated by lying down. He has tried nothing for the symptoms. The treatment provided no relief. There is no history of asthma or COPD.       Constitution: Positive for chills and fever.   HENT:  Positive for congestion, postnasal drip and sore throat.    Neck: neck negative.   Cardiovascular: Negative.    Eyes: Negative.    Respiratory:  Positive for cough. Negative for sputum production.    Gastrointestinal:  Positive for diarrhea.   Genitourinary: Negative.    Musculoskeletal:  Positive for muscle ache.   Skin: Negative.  Negative for erythema.   Allergic/Immunologic: Positive for immunizations up-to-date. Negative for flu shot.   Neurological:  Positive for headaches.   Hematologic/Lymphatic: Negative.       Objective:     Physical Exam   Constitutional: He is oriented to person, place, and time. He is cooperative.  Non-toxic appearance. He does not appear ill. No distress.   HENT:   Head: Normocephalic and atraumatic.   Ears:   Right Ear: Tympanic " membrane, external ear and ear canal normal.   Left Ear: Tympanic membrane, external ear and ear canal normal.   Nose: Nose normal.   Mouth/Throat: Uvula is midline, oropharynx is clear and moist and mucous membranes are normal. Mucous membranes are moist. No oropharyngeal exudate or posterior oropharyngeal erythema. Oropharynx is clear.      Comments: Tonsils surgically absent  Eyes: Conjunctivae and lids are normal. Pupils are equal, round, and reactive to light. Extraocular movement intact   Neck: Trachea normal and phonation normal. Neck supple.   Cardiovascular: Normal rate, regular rhythm, normal heart sounds and normal pulses.   Pulmonary/Chest: Effort normal and breath sounds normal. He has no wheezes. He has no rhonchi. He has no rales.   Abdominal: Normal appearance. Soft. flat abdomen There is no abdominal tenderness.   Musculoskeletal: Normal range of motion.         General: Normal range of motion.   Lymphadenopathy:     He has cervical adenopathy.   Neurological: no focal deficit. He is alert, oriented to person, place, and time and at baseline. He has normal motor skills, normal sensation and intact cranial nerves (2-12). Gait and coordination normal. GCS eye subscore is 4. GCS verbal subscore is 5. GCS motor subscore is 6.   Skin: Skin is warm, dry and not diaphoretic. Capillary refill takes 2 to 3 seconds. No bruising, No erythema and No lesion   Psychiatric: He experiences Normal attention and Normal perception. His speech is normal and behavior is normal. Mood, judgment and thought content normal.   Nursing note and vitals reviewed.      Assessment:     1. Viral URI with cough    2. Cough, unspecified type    3. Fever, unspecified fever cause    4. Cervical lymphadenopathy    5. Influenza A virus not detected    6. COVID-19 virus not detected        Plan:       Viral URI with cough  -     COVID-19,flu A,B antigen test Kit; 1 application  by Misc.(Non-Drug; Combo Route) route every 48 hours as  needed (uri symptoms).  Dispense: 1 kit; Refill: 3  -     pyrilamine-chlophedianoL 12.5-12.5 mg/5 mL Liqd; Take 10 mLs by mouth 3 (three) times daily as needed (cough).  Dispense: 210 mL; Refill: 0  -     promethazine-dextromethorphan (PROMETHAZINE-DM) 6.25-15 mg/5 mL Syrp; Take 5 mLs by mouth nightly as needed (night time cough).  Dispense: 50 mL; Refill: 0    Cough, unspecified type  -     SARS Coronavirus 2 Antigen, POCT Manual Read  -     POCT Influenza A/B Rapid Antigen    Fever, unspecified fever cause  -     SARS Coronavirus 2 Antigen, POCT Manual Read  -     POCT Influenza A/B Rapid Antigen  -     POCT rapid strep A    Cervical lymphadenopathy    Influenza A virus not detected    COVID-19 virus not detected    Viral testing negative.  Patient did have positive COVID exposure.  likely testing too early.  Additional tests were sent into pharmacy for patient to retest in 24 in 48 hours.  Instructed to return to clinic or see his primary care doctor if positive and if you would like to begin antivirals.

## 2025-01-17 NOTE — PATIENT INSTRUCTIONS
Retest for COVID and influenza and 24 and 48 hours.  If positive return to clinic or contact your primary care doctor to begin antiviral medications.  If test continued to be negative then continue supportive treatments.  If illness exceeds 10 days since onset follow up your primary care doctor further management

## 2025-02-27 ENCOUNTER — OFFICE VISIT (OUTPATIENT)
Dept: URGENT CARE | Facility: CLINIC | Age: 55
End: 2025-02-27
Payer: COMMERCIAL

## 2025-02-27 VITALS
HEIGHT: 69 IN | WEIGHT: 195 LBS | DIASTOLIC BLOOD PRESSURE: 94 MMHG | TEMPERATURE: 98 F | HEART RATE: 78 BPM | SYSTOLIC BLOOD PRESSURE: 155 MMHG | OXYGEN SATURATION: 98 % | RESPIRATION RATE: 16 BRPM | BODY MASS INDEX: 28.88 KG/M2

## 2025-02-27 DIAGNOSIS — R50.9 FEVER, UNSPECIFIED FEVER CAUSE: ICD-10-CM

## 2025-02-27 DIAGNOSIS — J32.9 BACTERIAL SINUSITIS: Primary | ICD-10-CM

## 2025-02-27 DIAGNOSIS — B96.89 BACTERIAL SINUSITIS: Primary | ICD-10-CM

## 2025-02-27 LAB
CTP QC/QA: YES
FLUAV AG NPH QL: NEGATIVE
FLUBV AG NPH QL: NEGATIVE
S PYO RRNA THROAT QL PROBE: NEGATIVE
SARS CORONAVIRUS 2 ANTIGEN: NEGATIVE

## 2025-02-27 RX ORDER — AMOXICILLIN 500 MG/1
500 CAPSULE ORAL EVERY 8 HOURS
Qty: 30 CAPSULE | Refills: 0 | Status: SHIPPED | OUTPATIENT
Start: 2025-02-27 | End: 2025-03-09

## 2025-02-27 RX ORDER — PROMETHAZINE HYDROCHLORIDE AND DEXTROMETHORPHAN HYDROBROMIDE 6.25; 15 MG/5ML; MG/5ML
5 SYRUP ORAL
Qty: 118 ML | Refills: 0 | Status: SHIPPED | OUTPATIENT
Start: 2025-02-27 | End: 2025-03-09

## 2025-02-27 NOTE — PROGRESS NOTES
"Subjective:      Patient ID: Jules Floyd Jr. is a 54 y.o. male.    Vitals:  height is 5' 9" (1.753 m) and weight is 88.5 kg (195 lb). His temperature is 98.3 °F (36.8 °C). His blood pressure is 155/94 (abnormal) and his pulse is 78. His respiration is 16 and oxygen saturation is 98%.     Chief Complaint: Sore Throat    54-year-old afebrile male who presents today with chief complaint of sinus congestion, nasal congestion, sinus pressure, sore throat, and cough for the past several days.  He denies any fever or chills.    Sore Throat   This is a new problem. The current episode started in the past 7 days (3 days). The problem has been unchanged. The maximum temperature recorded prior to his arrival was 101 - 101.9 F. Associated symptoms include congestion and coughing.       HENT:  Positive for congestion, sinus pressure and sore throat.    Respiratory:  Positive for cough.    Skin:  Negative for erythema.      Objective:     Physical Exam   Constitutional: He is oriented to person, place, and time.  Non-toxic appearance. He does not appear ill. No distress. normal  HENT:   Head: Normocephalic and atraumatic.   Ears:   Right Ear: Tympanic membrane, external ear and ear canal normal.   Left Ear: Tympanic membrane, external ear and ear canal normal.   Nose: Congestion present. Right sinus exhibits maxillary sinus tenderness. Left sinus exhibits maxillary sinus tenderness.   Mouth/Throat: Mucous membranes are moist. No posterior oropharyngeal erythema. Oropharynx is clear.   Eyes: Conjunctivae are normal. Pupils are equal, round, and reactive to light. Extraocular movement intact   Neck: Neck supple. No neck rigidity present.   Cardiovascular: Normal rate, regular rhythm, normal heart sounds and normal pulses.   Pulmonary/Chest: Effort normal and breath sounds normal.   Abdominal: Normal appearance.   Musculoskeletal: Normal range of motion.         General: Normal range of motion.      Cervical back: He exhibits no " tenderness.   Lymphadenopathy:     He has no cervical adenopathy.   Neurological: He is alert and oriented to person, place, and time.   Skin: Skin is warm, dry, not diaphoretic, not pale and no rash. Capillary refill takes less than 2 seconds. No bruising, No erythema and No lesion no jaundice  Psychiatric: His behavior is normal.   Vitals reviewed.    .jr  Assessment:     1. Bacterial sinusitis    2. Fever, unspecified fever cause        Plan:       Bacterial sinusitis  -     amoxicillin (AMOXIL) 500 MG capsule; Take 1 capsule (500 mg total) by mouth every 8 (eight) hours. for 10 days  Dispense: 30 capsule; Refill: 0  -     promethazine-dextromethorphan (PROMETHAZINE-DM) 6.25-15 mg/5 mL Syrp; Take 5 mLs by mouth every 4 to 6 hours as needed (Cough).  Dispense: 118 mL; Refill: 0    Fever, unspecified fever cause  -     SARS Coronavirus 2 Antigen, POCT Manual Read  -     POCT Influenza A/B Rapid Antigen  -     POCT rapid strep A      INSTRUCTIONS  Medications as prescribed.  Rest.  Increase oral fluids.  Follow up with your doctor as advised.  To ER for worsening of symptoms, or for any new symptoms as discussed.  Recheck your blood pressure as advised.  If your blood pressure remains elevated to ER for further evaluation and treatment.

## 2025-05-13 ENCOUNTER — OFFICE VISIT (OUTPATIENT)
Dept: URGENT CARE | Facility: CLINIC | Age: 55
End: 2025-05-13
Payer: COMMERCIAL

## 2025-05-13 ENCOUNTER — CLINICAL SUPPORT (OUTPATIENT)
Dept: URGENT CARE | Facility: CLINIC | Age: 55
End: 2025-05-13

## 2025-05-13 VITALS
TEMPERATURE: 98 F | RESPIRATION RATE: 16 BRPM | HEIGHT: 69 IN | HEART RATE: 62 BPM | OXYGEN SATURATION: 97 % | WEIGHT: 201 LBS | DIASTOLIC BLOOD PRESSURE: 83 MMHG | BODY MASS INDEX: 29.77 KG/M2 | SYSTOLIC BLOOD PRESSURE: 123 MMHG

## 2025-05-13 DIAGNOSIS — Z00.00 ROUTINE GENERAL MEDICAL EXAMINATION AT A HEALTH CARE FACILITY: Primary | ICD-10-CM

## 2025-05-13 DIAGNOSIS — L02.214 CUTANEOUS ABSCESS OF GROIN: Primary | ICD-10-CM

## 2025-05-13 DIAGNOSIS — L08.9 SKIN INFECTION: ICD-10-CM

## 2025-05-13 PROCEDURE — 99499 UNLISTED E&M SERVICE: CPT | Mod: S$GLB,,, | Performed by: EMERGENCY MEDICINE

## 2025-05-13 PROCEDURE — 99214 OFFICE O/P EST MOD 30 MIN: CPT | Mod: S$GLB,,,

## 2025-05-13 RX ORDER — ONDANSETRON 8 MG/1
8 TABLET, ORALLY DISINTEGRATING ORAL EVERY 8 HOURS PRN
COMMUNITY
Start: 2024-12-04

## 2025-05-13 RX ORDER — MUPIROCIN 20 MG/G
OINTMENT TOPICAL 3 TIMES DAILY
Qty: 30 G | Refills: 0 | Status: SHIPPED | OUTPATIENT
Start: 2025-05-13

## 2025-05-13 RX ORDER — METHOCARBAMOL 750 MG/1
TABLET, FILM COATED ORAL
COMMUNITY
Start: 2025-04-29

## 2025-05-13 RX ORDER — DOXYCYCLINE HYCLATE 100 MG
100 TABLET ORAL 2 TIMES DAILY
Qty: 20 TABLET | Refills: 0 | Status: SHIPPED | OUTPATIENT
Start: 2025-05-13 | End: 2025-05-23

## 2025-05-13 NOTE — PROGRESS NOTES
"Subjective:      Patient ID: Jules Floyd Jr. is a 54 y.o. male.    Vitals:  height is 5' 9" (1.753 m) and weight is 91.2 kg (201 lb). His oral temperature is 97.7 °F (36.5 °C). His blood pressure is 123/83 and his pulse is 62. His respiration is 16 and oxygen saturation is 97%.     Chief Complaint: Mass    Pt has a bump in right groin area.  He 1st noticed it yesterday.  He thought it was a pimple but his wife is concerned for it being staph.  There is a small amount of drainage coming from the area and discussed with patient I do not feel it needs to be drained at this time.  Patient agreeable to trialing course of antibiotics and warm compress.    Mass  This is a new problem. The current episode started yesterday. The problem has been gradually worsening. Pertinent negatives include no chills, fatigue or fever.       Constitution: Negative for chills, fatigue and fever.   Skin:  Positive for erythema and abscess (Right groin).      Objective:     Physical Exam   Constitutional: He is oriented to person, place, and time. He appears well-developed.   HENT:   Head: Normocephalic and atraumatic. Head is without abrasion, without contusion and without laceration.   Mouth/Throat: Oropharynx is clear and moist and mucous membranes are normal.   Eyes: EOM and lids are normal.   Neck: Trachea normal and phonation normal. Neck supple.   Cardiovascular: Normal rate.   Pulmonary/Chest: Effort normal. No respiratory distress.   Genitourinary:         Musculoskeletal: Normal range of motion.         General: Normal range of motion.   Neurological: He is alert and oriented to person, place, and time. He displays no weakness.   Skin: Skin is warm, dry, intact, no rash and abscessed. Capillary refill takes less than 2 seconds. erythema No abrasion, No burn, No bruising and No ecchymosis        Psychiatric: His speech is normal and behavior is normal. Mood, judgment and thought content normal.   Nursing note and vitals " reviewed.chaperone present (ANGELICA Bryson)         Assessment:     1. Cutaneous abscess of groin    2. Skin infection        Plan:       Cutaneous abscess of groin  -     doxycycline (VIBRA-TABS) 100 MG tablet; Take 1 tablet (100 mg total) by mouth 2 (two) times daily. for 10 days  Dispense: 20 tablet; Refill: 0  -     mupirocin (BACTROBAN) 2 % ointment; Apply topically 3 (three) times daily.  Dispense: 30 g; Refill: 0    Skin infection      Discussed medication with patient who acknowledges understanding and is agreeable to POC. Follow up with primary care. Increase fluid intake. Red flags for ER discussed.

## 2025-05-13 NOTE — PATIENT INSTRUCTIONS
Doxycycline twice a day for 10 days.  Always take with food and a full glass of water and do not lay down for 1 hour after taking each dose.  Be sure to protect her skin against the sun as doxycycline is well known to cause excessive sun skin sensitivity that could result in severe sunburn, rash, blistering    Monitor area for worsening signs of infection and if this occurs please go to the ER for further evaluation or return to clinic.    Mupirocin ointment 2 to 3 times a day to the area    Keep area clean and dry.  I recommend dial gold antibacterial soap.    Follow up with primary care for re-evaluation

## 2025-09-04 ENCOUNTER — OFFICE VISIT (OUTPATIENT)
Dept: URGENT CARE | Facility: CLINIC | Age: 55
End: 2025-09-04
Payer: COMMERCIAL

## 2025-09-04 VITALS
OXYGEN SATURATION: 97 % | BODY MASS INDEX: 28.58 KG/M2 | HEART RATE: 88 BPM | HEIGHT: 69 IN | TEMPERATURE: 99 F | RESPIRATION RATE: 20 BRPM | DIASTOLIC BLOOD PRESSURE: 81 MMHG | WEIGHT: 193 LBS | SYSTOLIC BLOOD PRESSURE: 123 MMHG

## 2025-09-04 DIAGNOSIS — R52 BODY ACHES: Primary | ICD-10-CM

## 2025-09-04 DIAGNOSIS — U07.1 COVID: ICD-10-CM

## 2025-09-04 DIAGNOSIS — U07.1 COVID-19 VIRUS DETECTED: ICD-10-CM

## 2025-09-04 LAB
CTP QC/QA: YES
CTP QC/QA: YES
FLUAV AG NPH QL: NEGATIVE
FLUBV AG NPH QL: NEGATIVE
SARS-COV+SARS-COV-2 AG RESP QL IA.RAPID: POSITIVE

## 2025-09-04 RX ORDER — PROMETHAZINE HYDROCHLORIDE AND DEXTROMETHORPHAN HYDROBROMIDE 6.25; 15 MG/5ML; MG/5ML
5 SYRUP ORAL EVERY 4 HOURS PRN
Qty: 180 ML | Refills: 0 | Status: SHIPPED | OUTPATIENT
Start: 2025-09-04 | End: 2025-09-14